# Patient Record
Sex: MALE | Race: OTHER | HISPANIC OR LATINO | URBAN - METROPOLITAN AREA
[De-identification: names, ages, dates, MRNs, and addresses within clinical notes are randomized per-mention and may not be internally consistent; named-entity substitution may affect disease eponyms.]

---

## 2021-10-05 ENCOUNTER — INPATIENT (INPATIENT)
Facility: HOSPITAL | Age: 71
LOS: 19 days | Discharge: HOME HEALTH | DRG: 216 | End: 2021-10-25
Attending: THORACIC SURGERY (CARDIOTHORACIC VASCULAR SURGERY) | Admitting: THORACIC SURGERY (CARDIOTHORACIC VASCULAR SURGERY)
Payer: MEDICAID

## 2021-10-05 VITALS
SYSTOLIC BLOOD PRESSURE: 143 MMHG | OXYGEN SATURATION: 97 % | TEMPERATURE: 98 F | HEART RATE: 80 BPM | DIASTOLIC BLOOD PRESSURE: 69 MMHG | RESPIRATION RATE: 18 BRPM | WEIGHT: 138.89 LBS

## 2021-10-05 DIAGNOSIS — Z95.1 PRESENCE OF AORTOCORONARY BYPASS GRAFT: Chronic | ICD-10-CM

## 2021-10-05 DIAGNOSIS — I34.2 NONRHEUMATIC MITRAL (VALVE) STENOSIS: ICD-10-CM

## 2021-10-05 PROBLEM — Z00.00 ENCOUNTER FOR PREVENTIVE HEALTH EXAMINATION: Status: ACTIVE | Noted: 2021-10-05

## 2021-10-05 LAB — GLUCOSE BLDC GLUCOMTR-MCNC: 97 MG/DL — SIGNIFICANT CHANGE UP (ref 70–99)

## 2021-10-05 PROCEDURE — 99223 1ST HOSP IP/OBS HIGH 75: CPT

## 2021-10-05 PROCEDURE — 99222 1ST HOSP IP/OBS MODERATE 55: CPT

## 2021-10-05 PROCEDURE — 71045 X-RAY EXAM CHEST 1 VIEW: CPT | Mod: 26

## 2021-10-05 RX ORDER — AMLODIPINE BESYLATE 2.5 MG/1
2.5 TABLET ORAL DAILY
Refills: 0 | Status: DISCONTINUED | OUTPATIENT
Start: 2021-10-05 | End: 2021-10-05

## 2021-10-05 RX ORDER — AMLODIPINE BESYLATE 2.5 MG/1
2.5 TABLET ORAL DAILY
Refills: 0 | Status: DISCONTINUED | OUTPATIENT
Start: 2021-10-06 | End: 2021-10-11

## 2021-10-05 RX ORDER — FUROSEMIDE 40 MG
40 TABLET ORAL DAILY
Refills: 0 | Status: DISCONTINUED | OUTPATIENT
Start: 2021-10-05 | End: 2021-10-11

## 2021-10-05 RX ORDER — ATORVASTATIN CALCIUM 80 MG/1
40 TABLET, FILM COATED ORAL AT BEDTIME
Refills: 0 | Status: DISCONTINUED | OUTPATIENT
Start: 2021-10-05 | End: 2021-10-11

## 2021-10-05 RX ORDER — METOPROLOL TARTRATE 50 MG
25 TABLET ORAL DAILY
Refills: 0 | Status: DISCONTINUED | OUTPATIENT
Start: 2021-10-05 | End: 2021-10-05

## 2021-10-05 RX ORDER — FOLIC ACID 0.8 MG
1 TABLET ORAL DAILY
Refills: 0 | Status: DISCONTINUED | OUTPATIENT
Start: 2021-10-06 | End: 2021-10-11

## 2021-10-05 RX ORDER — METOPROLOL TARTRATE 50 MG
25 TABLET ORAL DAILY
Refills: 0 | Status: DISCONTINUED | OUTPATIENT
Start: 2021-10-06 | End: 2021-10-11

## 2021-10-05 RX ORDER — THIAMINE MONONITRATE (VIT B1) 100 MG
1 TABLET ORAL
Qty: 0 | Refills: 0 | DISCHARGE

## 2021-10-05 RX ORDER — THIAMINE MONONITRATE (VIT B1) 100 MG
100 TABLET ORAL DAILY
Refills: 0 | Status: DISCONTINUED | OUTPATIENT
Start: 2021-10-05 | End: 2021-10-11

## 2021-10-05 RX ORDER — SODIUM CHLORIDE 9 MG/ML
3 INJECTION INTRAMUSCULAR; INTRAVENOUS; SUBCUTANEOUS EVERY 8 HOURS
Refills: 0 | Status: DISCONTINUED | OUTPATIENT
Start: 2021-10-05 | End: 2021-10-11

## 2021-10-05 NOTE — H&P ADULT - PROBLEM SELECTOR PLAN 4
Rate controlled on atrial fibrillation on tele   Continue beta blocker  On coumadin at home, will anticoagulate with heparin gtt for now  Keep K+ >4 Mg>2

## 2021-10-05 NOTE — H&P ADULT - NSHPPHYSICALEXAM_GEN_ALL_CORE
Tele: Afib 70s-80s  General: NAD  Neuro: A&Ox4, gait steady, speech clear, no focal deficits noted  Respiratory: B/L BS CTA, no wheeze, no rhonchi, no crackles noted  Cardiovascular: +PPM left anterior chest wall.  RRR, normal S1S2, no murmur noted  GI: Abd soft, NT/ND, +BSx4Q +BM  Peripheral Vascular:  B/L LE neg edema, 2+ peripheral pulses, no clubbing, cyanosis, varicosities/PVD noted  Musculoskeletal: B/L UE and LE 5/5 strength   Psychiatric: Normal mood, normal affect observed  Skin: Small circular non raised red rashes noted on chest which appears to be from where tele leads were previously placed.  Normal exam to inspection and palpation. no bleeding, no hematoma. Tele: Afib 70s-80s  General: NAD, patient with poor dentition +multiple missing teeth which have been falling out over the last several years   Neuro: A&Ox4, gait steady, speech clear, no focal deficits noted  Respiratory: B/L BS CTA, no wheeze, no rhonchi, no crackles noted  Cardiovascular: +PPM left anterior chest wall.  RRR, normal S1S2, no murmur noted  GI: Abd soft, NT/ND, +BSx4Q +BM  Peripheral Vascular:  B/L LE neg edema, 2+ peripheral pulses, no clubbing, cyanosis, varicosities/PVD noted  Musculoskeletal: B/L UE and LE 5/5 strength   Psychiatric: Normal mood, normal affect observed  Skin: Several small, itchy, circular non raised red rashes noted on chest which appears to be from where tele leads were previously placed.  Normal exam to inspection and palpation. no bleeding, no hematoma.

## 2021-10-05 NOTE — H&P ADULT - NSHPREVIEWOFSYSTEMS_GEN_ALL_CORE
General:  no weakness, fatigue, fevers or chills  Skin: no itching, burning, rashes, or lesions   HEENT: no visual changes;  no headache, no vertigo, no recent colds, nasal stuffiness or sore throat   Neck: no pain, stiffness or swollen glands  Respiratory: +SOB, no cough, sputum, wheezing, hemoptysis;   Cardiovascular: no chest pain, dyspnea or palpitations  GI: no abdominal or epigastric pain. no heartburn, nausea, vomiting, or hematemesis; no diarrhea or constipation. no melena or hematochezia  : no dysuria, frequency or hematuria  Peripheral Vascular: no intermittent claudication, leg cramps, varicose veins, swelling or swelling with redness and tenderness  Musculoskeletal: Denies limitations of movement or paralysis,  Neuro: no changes in orientation, memory, insight or judgment, no changes in mood, attention or speech.  no dizziness, vertigo or fainting, numbness, tingling or weakness

## 2021-10-05 NOTE — H&P ADULT - NSICDXPASTSURGICALHX_GEN_ALL_CORE_FT
PAST SURGICAL HISTORY:  History of mechanical aortic valve replacement     History of pacemaker      PAST SURGICAL HISTORY:  History of mechanical aortic valve replacement     History of pacemaker     S/P CABG (coronary artery bypass graft)

## 2021-10-05 NOTE — H&P ADULT - NSHPLABSRESULTS_GEN_ALL_CORE
CBC Full  -  ( 06 Oct 2021 00:39 )  WBC Count : 7.55 K/uL  RBC Count : 4.15 M/uL  Hemoglobin : 8.8 g/dL  Hematocrit : 29.3 %  Platelet Count - Automated : 183 K/uL  Mean Cell Volume : 70.6 fl  Mean Cell Hemoglobin : 21.2 pg  Mean Cell Hemoglobin Concentration : 30.0 gm/dL    Comprehensive Metabolic Panel (10.06.21 @ 00:39)    Sodium, Serum: 141 mmol/L    Potassium, Serum: 4.0 mmol/L    Chloride, Serum: 106 mmol/L    Carbon Dioxide, Serum: 24 mmol/L    Anion Gap, Serum: 11 mmol/L    Blood Urea Nitrogen, Serum: 17 mg/dL    Creatinine, Serum: 0.89 mg/dL    Glucose, Serum: 97 mg/dL    Calcium, Total Serum: 9.1 mg/dL    Protein Total, Serum: 7.1 g/dL    Albumin, Serum: 3.9 g/dL    Bilirubin Total, Serum: 0.7 mg/dL    Alkaline Phosphatase, Serum: 140 U/L    Aspartate Aminotransferase (AST/SGOT): 24 U/L    Alanine Aminotransferase (ALT/SGPT): 11 U/L    eGFR if Non : 87: Interpretative comment    PT/INR - ( 06 Oct 2021 00:39 )   PT: 14.1 sec;   INR: 1.18 ratio         PTT - ( 06 Oct 2021 00:39 )  PTT:107.0 sec    Serum Pro-Brain Natriuretic Peptide (10.06.21 @ 00:39)    Serum Pro-Brain Natriuretic Peptide: 689 pg/mL

## 2021-10-05 NOTE — H&P ADULT - ATTENDING COMMENTS
71 y/o s/p AVR, repair aortic aneurysm 2008, now with severe MR/TR.    Eval for redo sternotomy MVR TVR.  Pt unsure if he will undergo surgery.  Will also evaluate for mitraclip

## 2021-10-05 NOTE — H&P ADULT - NSHPSOCIALHISTORY_GEN_ALL_CORE
Patient is a retired   Lives with Sister  Denies alcohol use  Quit smoking when he was 54, smoked for 1 year 3 cigarettes a day Patient is a retired   Lives with Sister  Denies alcohol use  Smoked 3 cigarettes a day for 55 years quit 4 months ago  Drank beer for 30 years, several beers over the weekend.  Quit 1 year ago

## 2021-10-05 NOTE — H&P ADULT - HISTORY OF PRESENT ILLNESS
Patient is a 70M PMH HTN, CAD s/p CABG in 2009, Complete Heart Block s/p PPM placement, Paroxysmal Afib on coumadin, Aortic stenosis s/p Mechanical AVR who admitted to OSH with acute heart failure exacerbation found on echo to have low normal EF 50-55%, Mild reduced RV fx, mod-severe MR and severe TR. Transferred to [facility] for open heart surgery with Dr. Hua. Patient endorses shortness of breath and LAU, denies chest pain, abdominal pain, fevers, chills, nausea.  Patient is a 70M PMH HTN, CAD s/p CABG in 2009, Complete Heart Block s/p PPM placement, Paroxysmal Afib on coumadin, Aortic stenosis s/p Mechanical AVR who admitted to OSH with acute heart failure exacerbation/sob/LAU.  Patient states he has to stop and catch his breath when walking up stairs. On echo to have low normal EF 50-55%, Mild reduced RV fx, mod-severe MR and severe TR. Transferred to [facility] for open heart surgery with Dr. Hua. Patient endorses shortness of breath and LAU, denies chest pain, abdominal pain, fevers, chills, nausea.

## 2021-10-05 NOTE — H&P ADULT - ASSESSMENT
Patient is a 70M PMH HTN, CAD s/p CABG in 2009, Complete Heart Block s/p PPM placement, Paroxysmal Afib on coumadin, Aortic stenosis s/p Mechanical AVR who admitted to OSH with acute heart failure exacerbation found on echo to have low normal EF 50-55%, Mild reduced RV fx, mod-severe MR and severe TR. Transferred to [facility] for open heart surgery with Dr. Hua. Pre-op workup initiated.  Dr. Hua to review BRITT films from outside hospital, patient came with disc.  Patient will need CT Chest Non-Con and cardiac cath as part of pre-operative evaluation.  Currently patient is hemodynamically stable, rate controlled afib on tele and is chest pain/shortness of breath free on room air oxygen.  Coumadin held at this time and is being anticoagulated with heparin gtt given hx of afib/mechanical valve and need for cardiac cath.   Patient is a 70M PMH HTN, CAD s/p CABG 10 years ago per patient , Complete Heart Block s/p PPM placement, Paroxysmal Afib on coumadin, Aortic stenosis s/p Mechanical AVR who admitted to OSH with acute heart failure exacerbation found on echo to have low normal EF 50-55%, severely dilated RA/RV/pulm HTN reduced RV fx, severe MR and severe TR. Transferred to [facility] for open heart surgery with Dr. Hua. Pre-op workup initiated.  Dr. Hua to review BRITT films from outside hospital, patient came with disc.  Patient will need CT Chest Non-Con and cardiac cath as part of pre-operative evaluation.  Currently patient is hemodynamically stable, rate controlled afib on tele and is chest pain/shortness of breath free on room air oxygen.  Coumadin held at this time and is being anticoagulated with heparin gtt given hx of afib/mechanical valve and need for cardiac cath.

## 2021-10-05 NOTE — H&P ADULT - PROBLEM SELECTOR PLAN 2
Continue BB  Continue Atorvastatin s/p CABG 10 years ago per patient at [facility]   Continue BB  Continue Atorvastatin

## 2021-10-05 NOTE — H&P ADULT - PROBLEM SELECTOR PLAN 5
Patient with shortness of breath found to have at least mod MR on TTE done at OSH  Dr. Hua to review BRITT films  Continue BB  Continue Lasix 40mg  CXR

## 2021-10-06 DIAGNOSIS — Z95.1 PRESENCE OF AORTOCORONARY BYPASS GRAFT: Chronic | ICD-10-CM

## 2021-10-06 DIAGNOSIS — Z95.2 PRESENCE OF PROSTHETIC HEART VALVE: Chronic | ICD-10-CM

## 2021-10-06 DIAGNOSIS — I07.1 RHEUMATIC TRICUSPID INSUFFICIENCY: ICD-10-CM

## 2021-10-06 DIAGNOSIS — I48.91 UNSPECIFIED ATRIAL FIBRILLATION: ICD-10-CM

## 2021-10-06 DIAGNOSIS — I10 ESSENTIAL (PRIMARY) HYPERTENSION: ICD-10-CM

## 2021-10-06 DIAGNOSIS — I34.0 NONRHEUMATIC MITRAL (VALVE) INSUFFICIENCY: ICD-10-CM

## 2021-10-06 DIAGNOSIS — Z95.2 PRESENCE OF PROSTHETIC HEART VALVE: ICD-10-CM

## 2021-10-06 DIAGNOSIS — I25.10 ATHEROSCLEROTIC HEART DISEASE OF NATIVE CORONARY ARTERY WITHOUT ANGINA PECTORIS: ICD-10-CM

## 2021-10-06 DIAGNOSIS — Z95.0 PRESENCE OF CARDIAC PACEMAKER: Chronic | ICD-10-CM

## 2021-10-06 LAB
A1C WITH ESTIMATED AVERAGE GLUCOSE RESULT: 5.7 % — HIGH (ref 4–5.6)
A1C WITH ESTIMATED AVERAGE GLUCOSE RESULT: 5.8 % — HIGH (ref 4–5.6)
ALBUMIN SERPL ELPH-MCNC: 3.9 G/DL — SIGNIFICANT CHANGE UP (ref 3.3–5)
ALP SERPL-CCNC: 140 U/L — HIGH (ref 40–120)
ALT FLD-CCNC: 11 U/L — SIGNIFICANT CHANGE UP (ref 10–45)
ANION GAP SERPL CALC-SCNC: 11 MMOL/L — SIGNIFICANT CHANGE UP (ref 5–17)
ANISOCYTOSIS BLD QL: SLIGHT — SIGNIFICANT CHANGE UP
APPEARANCE UR: CLEAR — SIGNIFICANT CHANGE UP
APTT BLD: 105.5 SEC — HIGH (ref 27.5–35.5)
APTT BLD: 107 SEC — HIGH (ref 27.5–35.5)
AST SERPL-CCNC: 24 U/L — SIGNIFICANT CHANGE UP (ref 10–40)
BASOPHILS # BLD AUTO: 0.07 K/UL — SIGNIFICANT CHANGE UP (ref 0–0.2)
BASOPHILS NFR BLD AUTO: 0.9 % — SIGNIFICANT CHANGE UP (ref 0–2)
BILIRUB SERPL-MCNC: 0.7 MG/DL — SIGNIFICANT CHANGE UP (ref 0.2–1.2)
BILIRUB UR-MCNC: NEGATIVE — SIGNIFICANT CHANGE UP
BUN SERPL-MCNC: 17 MG/DL — SIGNIFICANT CHANGE UP (ref 7–23)
CALCIUM SERPL-MCNC: 9.1 MG/DL — SIGNIFICANT CHANGE UP (ref 8.4–10.5)
CHLORIDE SERPL-SCNC: 106 MMOL/L — SIGNIFICANT CHANGE UP (ref 96–108)
CHOLEST SERPL-MCNC: 103 MG/DL — SIGNIFICANT CHANGE UP
CO2 SERPL-SCNC: 24 MMOL/L — SIGNIFICANT CHANGE UP (ref 22–31)
COLOR SPEC: YELLOW — SIGNIFICANT CHANGE UP
COVID-19 SPIKE DOMAIN AB INTERP: POSITIVE
COVID-19 SPIKE DOMAIN ANTIBODY RESULT: 23.1 U/ML — HIGH
CREAT SERPL-MCNC: 0.89 MG/DL — SIGNIFICANT CHANGE UP (ref 0.5–1.3)
DIFF PNL FLD: NEGATIVE — SIGNIFICANT CHANGE UP
EOSINOPHIL # BLD AUTO: 2.14 K/UL — HIGH (ref 0–0.5)
EOSINOPHIL NFR BLD AUTO: 28.4 % — HIGH (ref 0–6)
ESTIMATED AVERAGE GLUCOSE: 117 MG/DL — HIGH (ref 68–114)
ESTIMATED AVERAGE GLUCOSE: 120 MG/DL — HIGH (ref 68–114)
FIBRINOGEN PPP-MCNC: 370 MG/DL — SIGNIFICANT CHANGE UP (ref 290–520)
GIANT PLATELETS BLD QL SMEAR: PRESENT — SIGNIFICANT CHANGE UP
GLUCOSE SERPL-MCNC: 97 MG/DL — SIGNIFICANT CHANGE UP (ref 70–99)
GLUCOSE UR QL: NEGATIVE — SIGNIFICANT CHANGE UP
HAV IGM SER-ACNC: SIGNIFICANT CHANGE UP
HBV CORE IGM SER-ACNC: SIGNIFICANT CHANGE UP
HBV SURFACE AG SER-ACNC: SIGNIFICANT CHANGE UP
HCT VFR BLD CALC: 29.1 % — LOW (ref 39–50)
HCT VFR BLD CALC: 29.3 % — LOW (ref 39–50)
HCV AB S/CO SERPL IA: 0.14 S/CO — SIGNIFICANT CHANGE UP (ref 0–0.99)
HCV AB S/CO SERPL IA: 0.15 S/CO — SIGNIFICANT CHANGE UP (ref 0–0.99)
HCV AB SERPL-IMP: SIGNIFICANT CHANGE UP
HCV AB SERPL-IMP: SIGNIFICANT CHANGE UP
HDLC SERPL-MCNC: 52 MG/DL — SIGNIFICANT CHANGE UP
HGB BLD-MCNC: 8.8 G/DL — LOW (ref 13–17)
HGB BLD-MCNC: 8.9 G/DL — LOW (ref 13–17)
HYPOCHROMIA BLD QL: SLIGHT — SIGNIFICANT CHANGE UP
INR BLD: 1.18 RATIO — HIGH (ref 0.88–1.16)
KETONES UR-MCNC: NEGATIVE — SIGNIFICANT CHANGE UP
LEUKOCYTE ESTERASE UR-ACNC: NEGATIVE — SIGNIFICANT CHANGE UP
LIPID PNL WITH DIRECT LDL SERPL: 45 MG/DL — SIGNIFICANT CHANGE UP
LYMPHOCYTES # BLD AUTO: 1.73 K/UL — SIGNIFICANT CHANGE UP (ref 1–3.3)
LYMPHOCYTES # BLD AUTO: 22.9 % — SIGNIFICANT CHANGE UP (ref 13–44)
MANUAL SMEAR VERIFICATION: SIGNIFICANT CHANGE UP
MCHC RBC-ENTMCNC: 21.2 PG — LOW (ref 27–34)
MCHC RBC-ENTMCNC: 21.7 PG — LOW (ref 27–34)
MCHC RBC-ENTMCNC: 30 GM/DL — LOW (ref 32–36)
MCHC RBC-ENTMCNC: 30.6 GM/DL — LOW (ref 32–36)
MCV RBC AUTO: 70.6 FL — LOW (ref 80–100)
MCV RBC AUTO: 71 FL — LOW (ref 80–100)
MONOCYTES # BLD AUTO: 0.97 K/UL — HIGH (ref 0–0.9)
MONOCYTES NFR BLD AUTO: 12.9 % — SIGNIFICANT CHANGE UP (ref 2–14)
MRSA PCR RESULT.: SIGNIFICANT CHANGE UP
NEUTROPHILS # BLD AUTO: 2.63 K/UL — SIGNIFICANT CHANGE UP (ref 1.8–7.4)
NEUTROPHILS NFR BLD AUTO: 34.9 % — LOW (ref 43–77)
NITRITE UR-MCNC: NEGATIVE — SIGNIFICANT CHANGE UP
NON HDL CHOLESTEROL: 52 MG/DL — SIGNIFICANT CHANGE UP
NRBC # BLD: 0 /100 WBCS — SIGNIFICANT CHANGE UP (ref 0–0)
NT-PROBNP SERPL-SCNC: 689 PG/ML — HIGH (ref 0–300)
OVALOCYTES BLD QL SMEAR: SLIGHT — SIGNIFICANT CHANGE UP
PA ADP PRP-ACNC: 274 PRU — SIGNIFICANT CHANGE UP (ref 194–417)
PH UR: 6.5 — SIGNIFICANT CHANGE UP (ref 5–8)
PLAT MORPH BLD: NORMAL — SIGNIFICANT CHANGE UP
PLATELET # BLD AUTO: 183 K/UL — SIGNIFICANT CHANGE UP (ref 150–400)
PLATELET # BLD AUTO: 186 K/UL — SIGNIFICANT CHANGE UP (ref 150–400)
PLATELET COUNT - ESTIMATE: ABNORMAL
POIKILOCYTOSIS BLD QL AUTO: SIGNIFICANT CHANGE UP
POTASSIUM SERPL-MCNC: 4 MMOL/L — SIGNIFICANT CHANGE UP (ref 3.5–5.3)
POTASSIUM SERPL-SCNC: 4 MMOL/L — SIGNIFICANT CHANGE UP (ref 3.5–5.3)
PREALB SERPL-MCNC: 10 MG/DL — LOW (ref 20–40)
PROT SERPL-MCNC: 7.1 G/DL — SIGNIFICANT CHANGE UP (ref 6–8.3)
PROT UR-MCNC: NEGATIVE — SIGNIFICANT CHANGE UP
PROTHROM AB SERPL-ACNC: 14.1 SEC — HIGH (ref 10.6–13.6)
RBC # BLD: 4.1 M/UL — LOW (ref 4.2–5.8)
RBC # BLD: 4.15 M/UL — LOW (ref 4.2–5.8)
RBC # FLD: 19.3 % — HIGH (ref 10.3–14.5)
RBC # FLD: 19.4 % — HIGH (ref 10.3–14.5)
RBC BLD AUTO: ABNORMAL
S AUREUS DNA NOSE QL NAA+PROBE: DETECTED
SARS-COV-2 IGG+IGM SERPL QL IA: 23.1 U/ML — HIGH
SARS-COV-2 IGG+IGM SERPL QL IA: POSITIVE
SARS-COV-2 RNA SPEC QL NAA+PROBE: SIGNIFICANT CHANGE UP
SCHISTOCYTES BLD QL AUTO: SLIGHT — SIGNIFICANT CHANGE UP
SMUDGE CELLS # BLD: PRESENT — SIGNIFICANT CHANGE UP
SODIUM SERPL-SCNC: 141 MMOL/L — SIGNIFICANT CHANGE UP (ref 135–145)
SP GR SPEC: 1.01 — SIGNIFICANT CHANGE UP (ref 1.01–1.02)
TRIGL SERPL-MCNC: 34 MG/DL — SIGNIFICANT CHANGE UP
TSH SERPL-MCNC: 8.37 UIU/ML — HIGH (ref 0.27–4.2)
UROBILINOGEN FLD QL: ABNORMAL
WBC # BLD: 6.56 K/UL — SIGNIFICANT CHANGE UP (ref 3.8–10.5)
WBC # BLD: 7.55 K/UL — SIGNIFICANT CHANGE UP (ref 3.8–10.5)
WBC # FLD AUTO: 6.56 K/UL — SIGNIFICANT CHANGE UP (ref 3.8–10.5)
WBC # FLD AUTO: 7.55 K/UL — SIGNIFICANT CHANGE UP (ref 3.8–10.5)

## 2021-10-06 PROCEDURE — 93454 CORONARY ARTERY ANGIO S&I: CPT | Mod: 26

## 2021-10-06 PROCEDURE — 99232 SBSQ HOSP IP/OBS MODERATE 35: CPT

## 2021-10-06 PROCEDURE — 93010 ELECTROCARDIOGRAM REPORT: CPT

## 2021-10-06 PROCEDURE — 71250 CT THORAX DX C-: CPT | Mod: 26

## 2021-10-06 PROCEDURE — 99152 MOD SED SAME PHYS/QHP 5/>YRS: CPT

## 2021-10-06 RX ORDER — HEPARIN SODIUM 5000 [USP'U]/ML
850 INJECTION INTRAVENOUS; SUBCUTANEOUS
Qty: 25000 | Refills: 0 | Status: DISCONTINUED | OUTPATIENT
Start: 2021-10-06 | End: 2021-10-06

## 2021-10-06 RX ORDER — HEPARIN SODIUM 5000 [USP'U]/ML
750 INJECTION INTRAVENOUS; SUBCUTANEOUS
Qty: 25000 | Refills: 0 | Status: DISCONTINUED | OUTPATIENT
Start: 2021-10-06 | End: 2021-10-11

## 2021-10-06 RX ORDER — FERROUS SULFATE 325(65) MG
325 TABLET ORAL DAILY
Refills: 0 | Status: DISCONTINUED | OUTPATIENT
Start: 2021-10-06 | End: 2021-10-11

## 2021-10-06 RX ADMIN — SODIUM CHLORIDE 3 MILLILITER(S): 9 INJECTION INTRAMUSCULAR; INTRAVENOUS; SUBCUTANEOUS at 16:00

## 2021-10-06 RX ADMIN — SODIUM CHLORIDE 3 MILLILITER(S): 9 INJECTION INTRAMUSCULAR; INTRAVENOUS; SUBCUTANEOUS at 06:31

## 2021-10-06 RX ADMIN — AMLODIPINE BESYLATE 2.5 MILLIGRAM(S): 2.5 TABLET ORAL at 05:18

## 2021-10-06 RX ADMIN — Medication 40 MILLIGRAM(S): at 05:18

## 2021-10-06 RX ADMIN — HEPARIN SODIUM 7.5 UNIT(S)/HR: 5000 INJECTION INTRAVENOUS; SUBCUTANEOUS at 10:18

## 2021-10-06 RX ADMIN — Medication 25 MILLIGRAM(S): at 05:18

## 2021-10-06 RX ADMIN — Medication 325 MILLIGRAM(S): at 12:12

## 2021-10-06 RX ADMIN — Medication 100 MILLIGRAM(S): at 12:12

## 2021-10-06 RX ADMIN — SODIUM CHLORIDE 3 MILLILITER(S): 9 INJECTION INTRAMUSCULAR; INTRAVENOUS; SUBCUTANEOUS at 22:08

## 2021-10-06 RX ADMIN — ATORVASTATIN CALCIUM 40 MILLIGRAM(S): 80 TABLET, FILM COATED ORAL at 20:35

## 2021-10-06 RX ADMIN — Medication 1 MILLIGRAM(S): at 12:12

## 2021-10-06 RX ADMIN — HEPARIN SODIUM 7.5 UNIT(S)/HR: 5000 INJECTION INTRAVENOUS; SUBCUTANEOUS at 20:35

## 2021-10-06 RX ADMIN — HEPARIN SODIUM 8.5 UNIT(S)/HR: 5000 INJECTION INTRAVENOUS; SUBCUTANEOUS at 02:02

## 2021-10-06 NOTE — PROGRESS NOTE ADULT - SUBJECTIVE AND OBJECTIVE BOX
Subjective: Pt states "Hello" denies any CP or SOB.     Telemetry:  Afib 70 - 90s  Vital Signs Last 24 Hrs  T(C): 36.3 (10-06-21 @ 11:01), Max: 36.9 (10-05-21 @ 21:30)  T(F): 97.3 (10-06-21 @ 11:01), Max: 98.4 (10-05-21 @ 21:30)  HR: 78 (10-06-21 @ 11:) (72 - 80)  BP: 126/65 (10-06-21 @ 11:01) (99/66 - 143/69)  RR: 18 (10-06-21 @ 11:) (18 - 18)  SpO2: 99% (10-06-21 @ 11:) (97% - 99%)             10-05 @ 07:01  -  10-06 @ 07:00  --------------------------------------------------------  IN: 0 mL / OUT: 301 mL / NET: -301 mL      Daily Weight in k.1 (06 Oct 2021 07:58)                        8.9    6.56  )-----------( 186      ( 06 Oct 2021 08:14 )             29.1     141  |  106  |  17  ----------------------------<  97  4.0   |  24  |  0.89    AST  24  /  ALT  11  /  AlkPhos  140<H>  10-06        CAPILLARY BLOOD GLUCOSE  POCT Blood Glucose.: 97 mg/dL (05 Oct 2021 22:31)          PHYSICAL EXAM  Neurology: A&Ox3, NAD  CV : RRR+S1S2  Lungs: Respirations non-labored, B/L BS CTA  Abdomen: Soft, NT/ND, +BSx4Q  : Voiding without difficulty  Extremities: B/L LE no edema, negative calf tenderness, +PP            MEDICATIONS  amLODIPine   Tablet 2.5 milliGRAM(s) Oral daily  atorvastatin 40 milliGRAM(s) Oral at bedtime  ferrous    sulfate 325 milliGRAM(s) Oral daily  folic acid 1 milliGRAM(s) Oral daily  furosemide    Tablet 40 milliGRAM(s) Oral daily  heparin  Infusion 850 Unit(s)/Hr IV Continuous <Continuous>  metoprolol succinate ER 25 milliGRAM(s) Oral daily  sodium chloride 0.9% lock flush 3 milliLiter(s) IV Push every 8 hours  thiamine 100 milliGRAM(s) Oral daily      Discussed with Cardiothoracic Team at AM rounds.

## 2021-10-06 NOTE — PROGRESS NOTE ADULT - PROBLEM SELECTOR PLAN 3
HX of Mechanical AVR from 2008  On coumadin at hold-- INR 1.18  Continue AC with Heparin gtt  aptt goal 70 -90  Check non-con CT Chest

## 2021-10-06 NOTE — PROGRESS NOTE ADULT - PROBLEM SELECTOR PLAN 2
s/p CABG 10 years ago per patient at [facility]   Continue ToprolXL 25 daily  Continue Atorvastatin 40 HS  Added on for pre-op Cardiac cath with Dr. Martins today

## 2021-10-06 NOTE — PROGRESS NOTE ADULT - ASSESSMENT
Patient is a 70M PMH HTN, CAD s/p CABG 10 years ago per patient , Complete Heart Block s/p PPM placement, Paroxysmal Afib on coumadin, Aortic stenosis s/p Mechanical AVR who admitted to OSH with acute heart failure exacerbation found on echo to have low normal EF 50-55%, severely dilated RA/RV/pulm HTN reduced RV fx, severe MR and severe TR. Transferred to [facility] for open heart surgery with Dr. Hua. Pre-op workup initiated.  Dr. Hua to review BRITT films from outside hospital, patient came with disc.  Patient will need CT Chest Non-Con and cardiac cath as part of pre-operative evaluation.  Currently patient is hemodynamically stable, rate controlled afib on tele and is chest pain/shortness of breath free on room air oxygen.  Coumadin held at this time and is being anticoagulated with heparin gtt given hx of afib/mechanical valve and need for cardiac cath.

## 2021-10-07 DIAGNOSIS — Z95.0 PRESENCE OF CARDIAC PACEMAKER: ICD-10-CM

## 2021-10-07 LAB
ANION GAP SERPL CALC-SCNC: 12 MMOL/L — SIGNIFICANT CHANGE UP (ref 5–17)
APTT BLD: 49.6 SEC — HIGH (ref 27.5–35.5)
APTT BLD: 72.1 SEC — HIGH (ref 27.5–35.5)
APTT BLD: 89.2 SEC — HIGH (ref 27.5–35.5)
BLD GP AB SCN SERPL QL: NEGATIVE — SIGNIFICANT CHANGE UP
BUN SERPL-MCNC: 17 MG/DL — SIGNIFICANT CHANGE UP (ref 7–23)
CALCIUM SERPL-MCNC: 9 MG/DL — SIGNIFICANT CHANGE UP (ref 8.4–10.5)
CHLORIDE SERPL-SCNC: 106 MMOL/L — SIGNIFICANT CHANGE UP (ref 96–108)
CO2 SERPL-SCNC: 22 MMOL/L — SIGNIFICANT CHANGE UP (ref 22–31)
CREAT SERPL-MCNC: 0.9 MG/DL — SIGNIFICANT CHANGE UP (ref 0.5–1.3)
GLUCOSE SERPL-MCNC: 92 MG/DL — SIGNIFICANT CHANGE UP (ref 70–99)
HCT VFR BLD CALC: 27.2 % — LOW (ref 39–50)
HCT VFR BLD CALC: 27.2 % — LOW (ref 39–50)
HCT VFR BLD CALC: 28 % — LOW (ref 39–50)
HGB BLD-MCNC: 8.1 G/DL — LOW (ref 13–17)
HGB BLD-MCNC: 8.4 G/DL — LOW (ref 13–17)
HGB BLD-MCNC: 8.4 G/DL — LOW (ref 13–17)
MCHC RBC-ENTMCNC: 21.1 PG — LOW (ref 27–34)
MCHC RBC-ENTMCNC: 21.3 PG — LOW (ref 27–34)
MCHC RBC-ENTMCNC: 21.9 PG — LOW (ref 27–34)
MCHC RBC-ENTMCNC: 29.8 GM/DL — LOW (ref 32–36)
MCHC RBC-ENTMCNC: 30 GM/DL — LOW (ref 32–36)
MCHC RBC-ENTMCNC: 30.9 GM/DL — LOW (ref 32–36)
MCV RBC AUTO: 70.8 FL — LOW (ref 80–100)
MCV RBC AUTO: 70.9 FL — LOW (ref 80–100)
MCV RBC AUTO: 71 FL — LOW (ref 80–100)
NRBC # BLD: 0 /100 WBCS — SIGNIFICANT CHANGE UP (ref 0–0)
PLATELET # BLD AUTO: 167 K/UL — SIGNIFICANT CHANGE UP (ref 150–400)
PLATELET # BLD AUTO: 170 K/UL — SIGNIFICANT CHANGE UP (ref 150–400)
PLATELET # BLD AUTO: 171 K/UL — SIGNIFICANT CHANGE UP (ref 150–400)
POTASSIUM SERPL-MCNC: 3.8 MMOL/L — SIGNIFICANT CHANGE UP (ref 3.5–5.3)
POTASSIUM SERPL-SCNC: 3.8 MMOL/L — SIGNIFICANT CHANGE UP (ref 3.5–5.3)
RBC # BLD: 3.83 M/UL — LOW (ref 4.2–5.8)
RBC # BLD: 3.84 M/UL — LOW (ref 4.2–5.8)
RBC # BLD: 3.95 M/UL — LOW (ref 4.2–5.8)
RBC # FLD: 19.7 % — HIGH (ref 10.3–14.5)
RBC # FLD: 19.7 % — HIGH (ref 10.3–14.5)
RBC # FLD: 19.8 % — HIGH (ref 10.3–14.5)
RH IG SCN BLD-IMP: POSITIVE — SIGNIFICANT CHANGE UP
SODIUM SERPL-SCNC: 140 MMOL/L — SIGNIFICANT CHANGE UP (ref 135–145)
T3 SERPL-MCNC: 104 NG/DL — SIGNIFICANT CHANGE UP (ref 80–200)
T4 AB SER-ACNC: 7.8 UG/DL — SIGNIFICANT CHANGE UP (ref 4.6–12)
WBC # BLD: 6.83 K/UL — SIGNIFICANT CHANGE UP (ref 3.8–10.5)
WBC # BLD: 6.94 K/UL — SIGNIFICANT CHANGE UP (ref 3.8–10.5)
WBC # BLD: 7.08 K/UL — SIGNIFICANT CHANGE UP (ref 3.8–10.5)
WBC # FLD AUTO: 6.83 K/UL — SIGNIFICANT CHANGE UP (ref 3.8–10.5)
WBC # FLD AUTO: 6.94 K/UL — SIGNIFICANT CHANGE UP (ref 3.8–10.5)
WBC # FLD AUTO: 7.08 K/UL — SIGNIFICANT CHANGE UP (ref 3.8–10.5)

## 2021-10-07 PROCEDURE — 99232 SBSQ HOSP IP/OBS MODERATE 35: CPT

## 2021-10-07 PROCEDURE — 93880 EXTRACRANIAL BILAT STUDY: CPT | Mod: 26

## 2021-10-07 PROCEDURE — 99253 IP/OBS CNSLTJ NEW/EST LOW 45: CPT

## 2021-10-07 RX ORDER — MUPIROCIN 20 MG/G
1 OINTMENT TOPICAL
Refills: 0 | Status: DISCONTINUED | OUTPATIENT
Start: 2021-10-07 | End: 2021-10-11

## 2021-10-07 RX ADMIN — SODIUM CHLORIDE 3 MILLILITER(S): 9 INJECTION INTRAMUSCULAR; INTRAVENOUS; SUBCUTANEOUS at 23:28

## 2021-10-07 RX ADMIN — SODIUM CHLORIDE 3 MILLILITER(S): 9 INJECTION INTRAMUSCULAR; INTRAVENOUS; SUBCUTANEOUS at 13:44

## 2021-10-07 RX ADMIN — SODIUM CHLORIDE 3 MILLILITER(S): 9 INJECTION INTRAMUSCULAR; INTRAVENOUS; SUBCUTANEOUS at 06:52

## 2021-10-07 RX ADMIN — Medication 40 MILLIGRAM(S): at 05:18

## 2021-10-07 RX ADMIN — ATORVASTATIN CALCIUM 40 MILLIGRAM(S): 80 TABLET, FILM COATED ORAL at 20:57

## 2021-10-07 RX ADMIN — HEPARIN SODIUM 9.5 UNIT(S)/HR: 5000 INJECTION INTRAVENOUS; SUBCUTANEOUS at 23:31

## 2021-10-07 RX ADMIN — HEPARIN SODIUM 9.5 UNIT(S)/HR: 5000 INJECTION INTRAVENOUS; SUBCUTANEOUS at 15:11

## 2021-10-07 RX ADMIN — Medication 1 MILLIGRAM(S): at 13:43

## 2021-10-07 RX ADMIN — MUPIROCIN 1 APPLICATION(S): 20 OINTMENT TOPICAL at 13:44

## 2021-10-07 RX ADMIN — Medication 100 MILLIGRAM(S): at 13:43

## 2021-10-07 RX ADMIN — HEPARIN SODIUM 9.5 UNIT(S)/HR: 5000 INJECTION INTRAVENOUS; SUBCUTANEOUS at 04:10

## 2021-10-07 RX ADMIN — MUPIROCIN 1 APPLICATION(S): 20 OINTMENT TOPICAL at 21:00

## 2021-10-07 RX ADMIN — Medication 325 MILLIGRAM(S): at 13:43

## 2021-10-07 NOTE — CONSULT NOTE ADULT - ATTENDING COMMENTS
seen and agree  plan for surgery with removal of intracardiac/SVC portions of lead and LV epicardial lead placement

## 2021-10-07 NOTE — CONSULT NOTE ADULT - SUBJECTIVE AND OBJECTIVE BOX
HISTORY OF PRESENT ILLNESS:  Patient is a 70M PMH HTN, CAD s/p CABG in 2009, Complete Heart Block s/p MDT Adapta PPM placement 7/7/2008 (Dr. Jose Patterson), Paroxysmal Afib on coumadin (non-adherent), Unicuspid AV/Aortic stenosis s/p Mechanical AVR (2008) who admitted to [facility] with acute heart failure exacerbation/sob/LAU.  Patient states he has to stop and catch his breath when walking up stairs. On echo to have low normal EF 50-55%, Mild reduced RV fx, mod-severe MR and severe TR. Transferred to [facility] for open heart surgery with Dr. Hua. Patient endorses shortness of breath and LAU, denies chest pain, abdominal pain, fevers, chills, nausea.      #: [id number]  Pt states he presented to [facility] as he was SOB and was experiencing some chest pain. He reports palpitations but states they are there "all the time", not worsening. Pt is unsure of indication of pacemaker placement but states he remembers being SOB/fatigued and was "slow". Pt has never had pacemaker interrogated since placement, does not have an outside cardiologist or EP clinic. He states he was prescribed Coumadin ~1 year ago, is not taking it regularly. Pt has a prescription bottle of Metoprolol 25mg, he states he is not taking that medication at home. Per medical records pt brought with him, he was prescribed Sotalol in 2008, states he never took it.     Per MDT: MDT Adapta placed at [facility] by Dr. Jose Patterson in 7/7/2008. Indication: Tachy/arely syndrome.      Allergies  penicillin (Rash; Pruritus; Hives)  tell electrodes (Rash)  	    MEDICATIONS:  amLODIPine   Tablet 2.5 milliGRAM(s) Oral daily  furosemide    Tablet 40 milliGRAM(s) Oral daily  heparin  Infusion 750 Unit(s)/Hr IV Continuous <Continuous>  metoprolol succinate ER 25 milliGRAM(s) Oral daily  atorvastatin 40 milliGRAM(s) Oral at bedtime  ferrous    sulfate 325 milliGRAM(s) Oral daily  folic acid 1 milliGRAM(s) Oral daily  mupirocin 2% Ointment 1 Application(s) Topical two times a day  sodium chloride 0.9% lock flush 3 milliLiter(s) IV Push every 8 hours  thiamine 100 milliGRAM(s) Oral daily    PAST MEDICAL & SURGICAL HISTORY:  HTN (hypertension)  Complete heart block  CAD (coronary artery disease)  Paroxysmal atrial fibrillation  History of mechanical aortic valve replacement  History of pacemaker  S/P CABG (coronary artery bypass graft)      SOCIAL HISTORY:    Smoker - 3 cigs/day  Alcohol - 2 beers 2x/week      REVIEW OF SYSTEMS:  See HPI. Otherwise, 10 point ROS done and otherwise negative.    PHYSICAL EXAM:  T(C): 36.8 (10-07-21 @ 04:33), Max: 37 (10-06-21 @ 20:33)  HR: 63 (10-07-21 @ 04:33) (61 - 78)  BP: 99/56 (10-07-21 @ 04:33) (95/54 - 123/75)  RR: 18 (10-07-21 @ 04:33) (13 - 20)  SpO2: 97% (10-07-21 @ 04:33) (94% - 100%)  Wt(kg): --  I&O's Summary    06 Oct 2021 07:01  -  07 Oct 2021 07:00  --------------------------------------------------------  IN: 976 mL / OUT: 300 mL / NET: 676 mL    07 Oct 2021 07:01  -  07 Oct 2021 11:49  --------------------------------------------------------  IN: 480 mL / OUT: 0 mL / NET: 480 mL      Appearance: Normal	  HEENT: Atraumatic  Cardiovascular: Irregularly irregular  Respiratory: Lungs clear to auscultation	  Psychiatry: A & O x 3, Mood & affect appropriate  Gastrointestinal:  Soft, Non-tender	  Neurologic: Non-focal  Extremities: No BLE edema  Vascular: Peripheral pulses palpable 2+ bilaterally        LABS:	 	  CBC Full  -  ( 07 Oct 2021 06:00 )  WBC Count : 6.83 K/uL  Hemoglobin : 8.4 g/dL  Hematocrit : 27.2 %  Platelet Count - Automated : 167 K/uL  Mean Cell Volume : 70.8 fl  Mean Cell Hemoglobin : 21.9 pg  Mean Cell Hemoglobin Concentration : 30.9 gm/dL    10-07    140  |  106  |  17  ----------------------------<  92  3.8   |  22  |  0.90  10-06    141  |  106  |  17  ----------------------------<  97  4.0   |  24  |  0.89    Ca    9.0      07 Oct 2021 06:00  Ca    9.1      06 Oct 2021 00:39    TPro  7.1  /  Alb  3.9  /  TBili  0.7  /  DBili  x   /  AST  24  /  ALT  11  /  AlkPhos  140<H>  10-06    proBNP: Serum Pro-Brain Natriuretic Peptide: 689 pg/mL (10.06.21 @ 00:39)  TSH: Thyroid Stimulating Hormone, Serum: 8.37 uIU/mL (10.06.21 @ 05:04)    TELEMETRY: AF 60-90s.     ECG:  AF rates ~60s, RBBB.   RADIOLOGY: < from: Xray Chest 1 View AP/PA (10.05.21 @ 23:48) >  IMPRESSION:    Cardiomegaly. Clear lungs.    < end of copied text >    PREVIOUS DIAGNOSTIC TESTING:    [ ] Echocardiogram: < from: Transthoracic Echocardiogram w/ Doppler (06.16.08 @ 11:00) >  Mitral Valve: Normal mitral valve.  Aortic Valve/Aorta: Mechanical aortic valve prosthesis.  Normal aortic root.  Left Atrium: Severe left atrial enlargement.  Left Ventricle: Concentric left ventricular hypertrophy.  Endocardium not well visualized; grossly normal left  ventricular function.  Right Heart: Severe right atrial enlargement.  Grossly normal right ventricular size and systolic  function.  Normal tricuspid and pulmonic valves.  Pericardium/Pleura: Normal pericardium with no pericardial  effusion.  Doppler: Minimal mitral regurgitation.  Minimal aortic regurgitation. Peak transaortic valve  gradient equals 37 mm Hg, mean transaortic valve gradient  equals 26mm Hg, which is probably normalin the setting of  a prosthetic valve.  Moderate tricuspid regurgitation. Estimated pulmonary  artery systolic pressure equals 32 mm Hg, assuming right  atrial pressure equals 10  mm Hg.  ------------------------------------------------------------------------  Conclusions:  1. Endocardium not well visualized; grossly normal left  ventricular function.  2. Grossly normal right ventricular size and systolic  function.  3. A mechanical prosthetic valve is present in the aortic  position and appearswell seated.  Minimal aortic  regurgitation. Peak transaortic valve gradient equals 37 mm  Hg, mean transaortic valve gradient equals 26mm Hg, which  is probably normal in the setting of a prosthetic valve.  4. Severe biatrial dilatation.    < end of copied text >    TTE 10/1/21 ([facility] records)  LVEF, by visual estimation, 50-55%  Low normal global LV systolic function  RV volume overload  Mildly reduced RV systolic function  RV size is severely enlarged  Mildly dilated Left atrium  Severely dilated right atrium  At least moderate eccentric MTR  Malcoapting tricuspid leaflets with torrestial TR  Aortic valve is normally functioning mechanical prosthetic valve.     BRITT 10/4/21 ([facility] records)  LVEF, by visual estimated is 50-55%  Normal global LV systolic function  RV size is severely enlarged  Mildly dilated LA  severely dilated RA  Mild thickening of the anterior and posterior mitral valve leaflets  Severe MVR  Aortic valve is normally functioning mechanical prosthetic valve  mechanical prosthesis in aortic valve position  Prosthetic graft in ascending aorta position  dilated pulmonary artery  pulmonary HTN is present  No LA/RENÉE thrombus seen  Small plaque involving transverse and descending aorta.     [ ]  Catheterization: < from: Cardiac Catheterization (10.06.21 @ 13:55) >    Diagnostic Findings:     Coronary Angiography   The coronary circulation is right dominant.      LM   Left main artery: Angiography shows no disease.      LAD   Left anterior descending artery: Angiography shows no disease.      Patient: EDISON ROBLES               MRN: [id number]  Study Date: 10/06/2021   01:55 PM      Page 1 of 3          CX   Circumflex: Angiography shows no disease.      RCA   Right coronary artery: Angiography shows no disease.      < end of copied text >   HISTORY OF PRESENT ILLNESS:  Patient is a 70M PMH HTN, CAD s/p CABG in 2009, Complete Heart Block s/p MDT Adapta PPM placement 7/7/2008 (Dr. Jose Patterson), Paroxysmal Afib on coumadin (non-adherent), Unicuspid AV/Aortic stenosis s/p Mechanical AVR (2008) who admitted to [facility] with acute heart failure exacerbation/sob/LAU.  Patient states he has to stop and catch his breath when walking up stairs. On echo to have low normal EF 50-55%, Mild reduced RV fx, mod-severe MR and severe TR. Transferred to [facility] for open heart surgery with Dr. Hua. Patient endorses shortness of breath and LAU, denies chest pain, abdominal pain, fevers, chills, nausea.      #: [id number]  Pt states he presented to [facility] as he was SOB and was experiencing some chest pain. He reports palpitations but states they are there "all the time", not worsening. Pt is unsure of indication of pacemaker placement but states he remembers being SOB/fatigued and was "slow". He denies any hx of syncope, prior to or after pacemaker was placed. Pt has never had pacemaker interrogated since placement, does not have an outside cardiologist or EP clinic. He states he was prescribed Coumadin ~1 year ago, is not taking it regularly. Pt has a prescription bottle of Metoprolol 25mg, he states he is not taking that medication at home. Per medical records pt brought with him, he was prescribed Sotalol in 2008, states he never took it.     Per MDT: MDT Adapta placed at [facility] by Dr. Jose Patterson in 7/7/2008. Indication: Tachy/arely syndrome.      Allergies  penicillin (Rash; Pruritus; Hives)  tell electrodes (Rash)  	    MEDICATIONS:  amLODIPine   Tablet 2.5 milliGRAM(s) Oral daily  furosemide    Tablet 40 milliGRAM(s) Oral daily  heparin  Infusion 750 Unit(s)/Hr IV Continuous <Continuous>  metoprolol succinate ER 25 milliGRAM(s) Oral daily  atorvastatin 40 milliGRAM(s) Oral at bedtime  ferrous    sulfate 325 milliGRAM(s) Oral daily  folic acid 1 milliGRAM(s) Oral daily  mupirocin 2% Ointment 1 Application(s) Topical two times a day  sodium chloride 0.9% lock flush 3 milliLiter(s) IV Push every 8 hours  thiamine 100 milliGRAM(s) Oral daily    PAST MEDICAL & SURGICAL HISTORY:  HTN (hypertension)  Complete heart block  CAD (coronary artery disease)  Paroxysmal atrial fibrillation  History of mechanical aortic valve replacement  History of pacemaker  S/P CABG (coronary artery bypass graft)      SOCIAL HISTORY:    Smoker - 3 cigs/day  Alcohol - 2 beers 2x/week      REVIEW OF SYSTEMS:  See HPI. Otherwise, 10 point ROS done and otherwise negative.    PHYSICAL EXAM:  T(C): 36.8 (10-07-21 @ 04:33), Max: 37 (10-06-21 @ 20:33)  HR: 63 (10-07-21 @ 04:33) (61 - 78)  BP: 99/56 (10-07-21 @ 04:33) (95/54 - 123/75)  RR: 18 (10-07-21 @ 04:33) (13 - 20)  SpO2: 97% (10-07-21 @ 04:33) (94% - 100%)  Wt(kg): --  I&O's Summary    06 Oct 2021 07:01  -  07 Oct 2021 07:00  --------------------------------------------------------  IN: 976 mL / OUT: 300 mL / NET: 676 mL    07 Oct 2021 07:01  -  07 Oct 2021 11:49  --------------------------------------------------------  IN: 480 mL / OUT: 0 mL / NET: 480 mL      Appearance: Normal	  HEENT: Atraumatic  Cardiovascular: Irregularly irregular  Respiratory: Lungs clear to auscultation	  Psychiatry: A & O x 3, Mood & affect appropriate  Gastrointestinal:  Soft, Non-tender	  Neurologic: Non-focal  Extremities: No BLE edema  Vascular: Peripheral pulses palpable 2+ bilaterally        LABS:	 	  CBC Full  -  ( 07 Oct 2021 06:00 )  WBC Count : 6.83 K/uL  Hemoglobin : 8.4 g/dL  Hematocrit : 27.2 %  Platelet Count - Automated : 167 K/uL  Mean Cell Volume : 70.8 fl  Mean Cell Hemoglobin : 21.9 pg  Mean Cell Hemoglobin Concentration : 30.9 gm/dL    10-07    140  |  106  |  17  ----------------------------<  92  3.8   |  22  |  0.90  10-06    141  |  106  |  17  ----------------------------<  97  4.0   |  24  |  0.89    Ca    9.0      07 Oct 2021 06:00  Ca    9.1      06 Oct 2021 00:39    TPro  7.1  /  Alb  3.9  /  TBili  0.7  /  DBili  x   /  AST  24  /  ALT  11  /  AlkPhos  140<H>  10-06    proBNP: Serum Pro-Brain Natriuretic Peptide: 689 pg/mL (10.06.21 @ 00:39)  TSH: Thyroid Stimulating Hormone, Serum: 8.37 uIU/mL (10.06.21 @ 05:04)    TELEMETRY: AF 60-90s.     ECG:  AF rates ~60s, RBBB.   RADIOLOGY: < from: Xray Chest 1 View AP/PA (10.05.21 @ 23:48) >  IMPRESSION:    Cardiomegaly. Clear lungs.    < end of copied text >    PREVIOUS DIAGNOSTIC TESTING:    [ ] Echocardiogram: < from: Transthoracic Echocardiogram w/ Doppler (06.16.08 @ 11:00) >  Mitral Valve: Normal mitral valve.  Aortic Valve/Aorta: Mechanical aortic valve prosthesis.  Normal aortic root.  Left Atrium: Severe left atrial enlargement.  Left Ventricle: Concentric left ventricular hypertrophy.  Endocardium not well visualized; grossly normal left  ventricular function.  Right Heart: Severe right atrial enlargement.  Grossly normal right ventricular size and systolic  function.  Normal tricuspid and pulmonic valves.  Pericardium/Pleura: Normal pericardium with no pericardial  effusion.  Doppler: Minimal mitral regurgitation.  Minimal aortic regurgitation. Peak transaortic valve  gradient equals 37 mm Hg, mean transaortic valve gradient  equals 26mm Hg, which is probably normalin the setting of  a prosthetic valve.  Moderate tricuspid regurgitation. Estimated pulmonary  artery systolic pressure equals 32 mm Hg, assuming right  atrial pressure equals 10  mm Hg.  ------------------------------------------------------------------------  Conclusions:  1. Endocardium not well visualized; grossly normal left  ventricular function.  2. Grossly normal right ventricular size and systolic  function.  3. A mechanical prosthetic valve is present in the aortic  position and appearswell seated.  Minimal aortic  regurgitation. Peak transaortic valve gradient equals 37 mm  Hg, mean transaortic valve gradient equals 26mm Hg, which  is probably normal in the setting of a prosthetic valve.  4. Severe biatrial dilatation.    < end of copied text >    TTE 10/1/21 ([facility] records)  LVEF, by visual estimation, 50-55%  Low normal global LV systolic function  RV volume overload  Mildly reduced RV systolic function  RV size is severely enlarged  Mildly dilated Left atrium  Severely dilated right atrium  At least moderate eccentric MTR  Malcoapting tricuspid leaflets with torrestial TR  Aortic valve is normally functioning mechanical prosthetic valve.     [facility]  LVEF, by visual estimated is 50-55%  Normal global LV systolic function  RV size is severely enlarged  Mildly dilated LA  severely dilated RA  Mild thickening of the anterior and posterior mitral valve leaflets  Severe MVR  Aortic valve is normally functioning mechanical prosthetic valve  mechanical prosthesis in aortic valve position  Prosthetic graft in ascending aorta position  dilated pulmonary artery  pulmonary HTN is present  No LA/RENÉE thrombus seen  Small plaque involving transverse and descending aorta.     [ ]  Catheterization: < from: Cardiac Catheterization (10.06.21 @ 13:55) >    Diagnostic Findings:     Coronary Angiography   The coronary circulation is right dominant.      LM   Left main artery: Angiography shows no disease.      LAD   Left anterior descending artery: Angiography shows no disease.      Patient: EDISON ROBLES               MRN: [id number]  Study Date: 10/06/2021   01:55 PM      Page 1 of 3          CX   Circumflex: Angiography shows no disease.      RCA   Right coronary artery: Angiography shows no disease.      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 Jose Patterson), CAD s/p CABG in 2009, Paroxysmal Afib on coumadin (non-adherent), who admitted to [facility] with acute heart failure exacerbation/sob/LAU.  Patient states he has to stop and catch his breath when walking up stairs. On echo to have low normal EF 50-55%, Mild reduced RV fx, mod-severe MR and severe TR. Transferred to [facility] for open heart surgery with Dr. Hua. Patient endorses shortness of breath and LAU, denies chest pain, abdominal pain, fevers, chills, nausea.      #: [id number]  Pt states he presented to [facility] as he was SOB and was experiencing some chest pain. He reports palpitations but states they are there "all the time", not worsening. Pt is unsure of indication of pacemaker placement but states he remembers being SOB/fatigued and was "slow". He denies any hx of syncope, prior to or after pacemaker was placed. Pt has never had pacemaker interrogated since placement, does not have an outside cardiologist or EP clinic. He states he was prescribed Coumadin ~1 year ago, is not taking it regularly. Pt has a prescription bottle of Metoprolol 25mg, he states he is not taking that medication at home. Per medical records pt brought with him, he was prescribed Sotalol in 2008, states he never took it.     Per MDT: ANTONIA Dozier placed at [facility] by Dr. Jose Patterson in 7/7/2008. Indication: Tachy/arely syndrome.      Allergies  penicillin (Rash; Pruritus; Hives)  tell electrodes (Rash)  	    MEDICATIONS:  amLODIPine   Tablet 2.5 milliGRAM(s) Oral daily  furosemide    Tablet 40 milliGRAM(s) Oral daily  heparin  Infusion 750 Unit(s)/Hr IV Continuous <Continuous>  metoprolol succinate ER 25 milliGRAM(s) Oral daily  atorvastatin 40 milliGRAM(s) Oral at bedtime  ferrous    sulfate 325 milliGRAM(s) Oral daily  folic acid 1 milliGRAM(s) Oral daily  mupirocin 2% Ointment 1 Application(s) Topical two times a day  sodium chloride 0.9% lock flush 3 milliLiter(s) IV Push every 8 hours  thiamine 100 milliGRAM(s) Oral daily    PAST MEDICAL & SURGICAL HISTORY:  HTN (hypertension)  Complete heart block  CAD (coronary artery disease)  Paroxysmal atrial fibrillation  History of mechanical aortic valve replacement  History of pacemaker  S/P CABG (coronary artery bypass graft)      SOCIAL HISTORY:    Smoker - 3 cigs/day  Alcohol - 2 beers 2x/week      REVIEW OF SYSTEMS:  See HPI. Otherwise, 10 point ROS done and otherwise negative.    PHYSICAL EXAM:  T(C): 36.8 (10-07-21 @ 04:33), Max: 37 (10-06-21 @ 20:33)  HR: 63 (10-07-21 @ 04:33) (61 - 78)  BP: 99/56 (10-07-21 @ 04:33) (95/54 - 123/75)  RR: 18 (10-07-21 @ 04:33) (13 - 20)  SpO2: 97% (10-07-21 @ 04:33) (94% - 100%)  Wt(kg): --  I&O's Summary    06 Oct 2021 07:01  -  07 Oct 2021 07:00  --------------------------------------------------------  IN: 976 mL / OUT: 300 mL / NET: 676 mL    07 Oct 2021 07:01  -  07 Oct 2021 11:49  --------------------------------------------------------  IN: 480 mL / OUT: 0 mL / NET: 480 mL      Appearance: Normal	  HEENT: Atraumatic  Cardiovascular: Irregularly irregular  Respiratory: Lungs clear to auscultation	  Psychiatry: A & O x 3, Mood & affect appropriate  Gastrointestinal:  Soft, Non-tender	  Neurologic: Non-focal  Extremities: No BLE edema  Vascular: Peripheral pulses palpable 2+ bilaterally        LABS:	 	  CBC Full  -  ( 07 Oct 2021 06:00 )  WBC Count : 6.83 K/uL  Hemoglobin : 8.4 g/dL  Hematocrit : 27.2 %  Platelet Count - Automated : 167 K/uL  Mean Cell Volume : 70.8 fl  Mean Cell Hemoglobin : 21.9 pg  Mean Cell Hemoglobin Concentration : 30.9 gm/dL    10-07    140  |  106  |  17  ----------------------------<  92  3.8   |  22  |  0.90  10-06    141  |  106  |  17  ----------------------------<  97  4.0   |  24  |  0.89    Ca    9.0      07 Oct 2021 06:00  Ca    9.1      06 Oct 2021 00:39    TPro  7.1  /  Alb  3.9  /  TBili  0.7  /  DBili  x   /  AST  24  /  ALT  11  /  AlkPhos  140<H>  10-06    proBNP: Serum Pro-Brain Natriuretic Peptide: 689 pg/mL (10.06.21 @ 00:39)  TSH: Thyroid Stimulating Hormone, Serum: 8.37 uIU/mL (10.06.21 @ 05:04)    TELEMETRY: AF 60-90s.     ECG:  AF rates ~60s, RBBB.   RADIOLOGY: < from: Xray Chest 1 View AP/PA (10.05.21 @ 23:48) >  IMPRESSION:    Cardiomegaly. Clear lungs.    < end of copied text >    PREVIOUS DIAGNOSTIC TESTING:    [ ] Echocardiogram: < from: Transthoracic Echocardiogram w/ Doppler (06.16.08 @ 11:00) >  Mitral Valve: Normal mitral valve.  Aortic Valve/Aorta: Mechanical aortic valve prosthesis.  Normal aortic root.  Left Atrium: Severe left atrial enlargement.  Left Ventricle: Concentric left ventricular hypertrophy.  Endocardium not well visualized; grossly normal left  ventricular function.  Right Heart: Severe right atrial enlargement.  Grossly normal right ventricular size and systolic  function.  Normal tricuspid and pulmonic valves.  Pericardium/Pleura: Normal pericardium with no pericardial  effusion.  Doppler: Minimal mitral regurgitation.  Minimal aortic regurgitation. Peak transaortic valve  gradient equals 37 mm Hg, mean transaortic valve gradient  equals 26mm Hg, which is probably normalin the setting of  a prosthetic valve.  Moderate tricuspid regurgitation. Estimated pulmonary  artery systolic pressure equals 32 mm Hg, assuming right  atrial pressure equals 10  mm Hg.  ------------------------------------------------------------------------  Conclusions:  1. Endocardium not well visualized; grossly normal left  ventricular function.  2. Grossly normal right ventricular size and systolic  function.  3. A mechanical prosthetic valve is present in the aortic  position and appearswell seated.  Minimal aortic  regurgitation. Peak transaortic valve gradient equals 37 mm  Hg, mean transaortic valve gradient equals 26mm Hg, which  is probably normal in the setting of a prosthetic valve.  4. Severe biatrial dilatation.    < end of copied text >    TTE 10/1/21 ([facility] records)  LVEF, by visual estimation, 50-55%  Low normal global LV systolic function  RV volume overload  Mildly reduced RV systolic function  RV size is severely enlarged  Mildly dilated Left atrium  Severely dilated right atrium  At least moderate eccentric MTR  Malcoapting tricuspid leaflets with torrestial TR  Aortic valve is normally functioning mechanical prosthetic valve.     BRITT 10/4/21 ([facility] records)  LVEF, by visual estimated is 50-55%  Normal global LV systolic function  RV size is severely enlarged  Mildly dilated LA  severely dilated RA  Mild thickening of the anterior and posterior mitral valve leaflets  Severe MVR  Aortic valve is normally functioning mechanical prosthetic valve  mechanical prosthesis in aortic valve position  Prosthetic graft in ascending aorta position  dilated pulmonary artery  pulmonary HTN is present  No LA/RENÉE thrombus seen  Small plaque involving transverse and descending aorta.     [ ]  Catheterization: < from: Cardiac Catheterization (10.06.21 @ 13:55) >    Diagnostic Findings:     Coronary Angiography   The coronary circulation is right dominant.      LM   Left main artery: Angiography shows no disease.      LAD   Left anterior descending artery: Angiography shows no disease.      Patient: EDISON ROBLES               MRN: [id number]  Study Date: 10/06/2021   01:55 PM      Page 1 of 3          CX   Circumflex: Angiography shows no disease.      RCA   Right coronary artery: Angiography shows no disease.      < end of copied text >

## 2021-10-07 NOTE — PROGRESS NOTE ADULT - PROBLEM SELECTOR PLAN 4
s/p CABG 10 years ago per patient at [facility]   Continue ToprolXL 25 daily  Continue Atorvastatin 40 HS  10/6 [facility] normal coronaries

## 2021-10-07 NOTE — CONSULT NOTE ADULT - SUBJECTIVE AND OBJECTIVE BOX
HISTORY OF PRESENT ILLNESS:      Allergies  penicillin (Rash; Pruritus; Hives)  tell electrodes (Rash)      MEDICATIONS:  amLODIPine   Tablet 2.5 milliGRAM(s) Oral daily  furosemide    Tablet 40 milliGRAM(s) Oral daily  heparin  Infusion 750 Unit(s)/Hr IV Continuous <Continuous>  metoprolol succinate ER 25 milliGRAM(s) Oral daily  atorvastatin 40 milliGRAM(s) Oral at bedtime  ferrous    sulfate 325 milliGRAM(s) Oral daily  folic acid 1 milliGRAM(s) Oral daily  mupirocin 2% Ointment 1 Application(s) Topical two times a day  sodium chloride 0.9% lock flush 3 milliLiter(s) IV Push every 8 hours  thiamine 100 milliGRAM(s) Oral daily    PAST MEDICAL & SURGICAL HISTORY:  HTN (hypertension)  Complete heart block  CAD (coronary artery disease)  Paroxysmal atrial fibrillation  History of mechanical aortic valve replacement  History of pacemaker  S/P CABG (coronary artery bypass graft)      SOCIAL HISTORY:    [ ] Smoker  [ ] Alcohol      REVIEW OF SYSTEMS:  See HPI. Otherwise, 10 point ROS done and otherwise negative.    PHYSICAL EXAM:  T(C): 36.8 (10-07-21 @ 04:33), Max: 37 (10-06-21 @ 20:33)  HR: 63 (10-07-21 @ 04:33) (61 - 78)  BP: 99/56 (10-07-21 @ 04:33) (95/54 - 123/75)  RR: 18 (10-07-21 @ 04:33) (13 - 20)  SpO2: 97% (10-07-21 @ 04:33) (94% - 100%)  Wt(kg): --  I&O's Summary    06 Oct 2021 07:01  -  07 Oct 2021 07:00  --------------------------------------------------------  IN: 976 mL / OUT: 300 mL / NET: 676 mL    07 Oct 2021 07:01  -  07 Oct 2021 11:41  --------------------------------------------------------  IN: 480 mL / OUT: 0 mL / NET: 480 mL        Appearance: Normal	  HEENT:   Normal oral mucosa, PERRL, EOMI	  Lymphatic: No lymphadenopathy  Cardiovascular: Normal S1 S2, No JVD, No murmurs, No edema  Respiratory: Lungs clear to auscultation	  Psychiatry: A & O x 3, Mood & affect appropriate  Gastrointestinal:  Soft, Non-tender, + BS	  Skin: No rashes, No ecchymoses, No cyanosis	  Neurologic: Non-focal  Extremities: Normal range of motion, No clubbing, cyanosis or edema  Vascular: Peripheral pulses palpable 2+ bilaterally        LABS:	 	    CBC Full  -  ( 07 Oct 2021 06:00 )  WBC Count : 6.83 K/uL  Hemoglobin : 8.4 g/dL  Hematocrit : 27.2 %  Platelet Count - Automated : 167 K/uL  Mean Cell Volume : 70.8 fl  Mean Cell Hemoglobin : 21.9 pg  Mean Cell Hemoglobin Concentration : 30.9 gm/dL  Auto Neutrophil # : x  Auto Lymphocyte # : x  Auto Monocyte # : x  Auto Eosinophil # : x  Auto Basophil # : x  Auto Neutrophil % : x  Auto Lymphocyte % : x  Auto Monocyte % : x  Auto Eosinophil % : x  Auto Basophil % : x    10-07    140  |  106  |  17  ----------------------------<  92  3.8   |  22  |  0.90  10-06    141  |  106  |  17  ----------------------------<  97  4.0   |  24  |  0.89    Ca    9.0      07 Oct 2021 06:00  Ca    9.1      06 Oct 2021 00:39    TPro  7.1  /  Alb  3.9  /  TBili  0.7  /  DBili  x   /  AST  24  /  ALT  11  /  AlkPhos  140<H>  10-06      proBNP:   Lipid Profile:   HgA1c:   TSH:       CARDIAC MARKERS:                TELEMETRY: 	    ECG:  	  RADIOLOGY:  OTHER: 	    PREVIOUS DIAGNOSTIC TESTING:    [ ] Echocardiogram:  [ ]  Catheterization:  [ ] Stress Test:  	  	  ASSESSMENT/PLAN:

## 2021-10-07 NOTE — PROGRESS NOTE ADULT - PROBLEM SELECTOR PLAN 1
Patient with shortness of breath found to have at least mod MR & severe TR on echo done at OSH  Continue pre-op cardiac surgery workup  Check TTE & US Carotids  Continue Lasix 40mg PO QD  Strict I & Os  Daily weight  OR Monday 10/11 with Dr. Hua

## 2021-10-07 NOTE — CONSULT NOTE ADULT - ASSESSMENT
1. Severe MR/TR  2.  1. Severe MR/TR  2. 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 AF  4. h/o mechanical AVR 2008  5. CAD s/p CABG 2009  6. HTN    - Pt is scheduled for surgery with Dr. Hua 10/11  - Attempted to interrogate pacemaker today. No response with Magnet. Battery is EOL, implanted in 2008. Pt reports he has never had device interrogated since implant.   - AF with well controlled rates ~60-70s. Noncompliant with Coumadin or Metoprolol at home. No bradycardia on tele.  - TSH 8.3. Please obtain FT4, endocrine consult if hypothyroid.  - On heparin gtt.      1. Severe MR/TR  2. 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 AF  4. h/o mechanical AVR 2008  5. CAD s/p CABG 2009  6. HTN    - Pt is scheduled for surgery with Dr. Hua 10/11  - Attempted to interrogate pacemaker today. No response with Magnet. Battery is EOL, implanted in 2008. Pt reports he has never had device interrogated since implant.   - AF with well controlled rates ~60-70s. Noncompliant with Coumadin or Metoprolol at home. No bradycardia on tele.  - TSH 8.3. Please obtain FT4, endocrine consult if hypothyroid.  - On heparin gtt.   - Potentially change pulse generator during surgery on Monday. Will discuss with EP attending.    1. Severe MR/TR  2. 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 AF  4. h/o mechanical AVR 2008  5. CAD s/p CABG 2009  6. HTN    - Pt is scheduled for surgery with Dr. Hua 10/11  - Attempted to interrogate pacemaker today. No response with Magnet. Battery is EOL, implanted in 2008. Pt reports he has never had device interrogated since implant.   - AF with well controlled rates ~60-70s. Noncompliant with Coumadin or Metoprolol at home. No bradycardia on tele.  - TSH 8.3. Please obtain FT4, endocrine consult if hypothyroid.  - On heparin gtt.   - CXR with severely dilated RV. Pacemaker has likely been EOL for ~2-3 years, unsure if leads are appropriately capturing. If receiving TVR, may need RV lead extraction. ?Micra for backup pacing. Will discuss with EP attending.    1. Severe MR/TR, severely dilated RV  2. 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 AF  4. h/o mechanical AVR 2008  5. CAD s/p CABG 2009  6. HTN    - Pt is scheduled for surgery with Dr. Hua 10/11  - Attempted to interrogate pacemaker today. No response with Magnet. Battery is EOL, implanted in 2008. Pt reports he has never had device interrogated since implant.   - AF with well controlled rates ~60-70s. Noncompliant with Coumadin or Metoprolol at home. No bradycardia on tele.  - TSH 8.3. Please obtain FT4, endocrine consult if hypothyroid.  - On heparin gtt.   - CXR with severely dilated RV. Pacemaker has likely been EOL for ~2-3 years, unsure if leads are appropriately capturing. If receiving TVR, may need lead extraction.   - Dr. Crenshaw discussed with Dr. Hua. Pt's RA and RV leads will be cut during procedure 10/11 and LV epicardial lead will be placed. At a later date, will replace pulse generator, extract RA & RV leads, and connect the epicardial lead to the pulse generator.

## 2021-10-07 NOTE — PROGRESS NOTE ADULT - SUBJECTIVE AND OBJECTIVE BOX
Subjective: Pt states "Hello" denies any CP or SOB. No acute events overnight.     Telemetry:  Afib 60 - 80  Vital Signs Last 24 Hrs  T(C): 36.8 (10-07-21 @ 04:33), Max: 37 (10-06-21 @ 20:33)  T(F): 98.2 (10-07-21 @ 04:33), Max: 98.6 (10-06-21 @ 20:33)  HR: 63 (10-07-21 @ 04:33) (61 - 78)  BP: 99/56 (10-07-21 @ 04:33) (95/54 - 123/75)  RR: 18 (10-07-21 @ 04:33) (13 - 20)  SpO2: 97% (10-07-21 @ 04:33) (94% - 100%)             10-06 @ 07:01  -  10-07 @ 07:00  --------------------------------------------------------  IN: 976 mL / OUT: 300 mL / NET: 676 mL      Daily Weight in k.5 (07 Oct 2021 07:50)                        8.4    6.83  )-----------( 167      ( 07 Oct 2021 06:00 )             27.2     140  |  106  |  17  ----------------------------<  92  3.8   |  22  |  0.90      PHYSICAL EXAM  Neurology: A&Ox3, NAD  CV : RRR+S1S2  Lungs: Respirations non-labored, B/L BS CTA  Abdomen: Soft, NT/ND, +BSx4Q  : Voiding without difficulty  Extremities: B/L LE no edema, negative calf tenderness, +PP                     +Right groin incision with DSD CDI          MEDICATIONS  amLODIPine   Tablet 2.5 milliGRAM(s) Oral daily  atorvastatin 40 milliGRAM(s) Oral at bedtime  ferrous    sulfate 325 milliGRAM(s) Oral daily  folic acid 1 milliGRAM(s) Oral daily  furosemide    Tablet 40 milliGRAM(s) Oral daily  heparin  Infusion 750 Unit(s)/Hr IV Continuous <Continuous>  metoprolol succinate ER 25 milliGRAM(s) Oral daily  sodium chloride 0.9% lock flush 3 milliLiter(s) IV Push every 8 hours  thiamine 100 milliGRAM(s) Oral daily        Discussed with Cardiothoracic Team at AM rounds.

## 2021-10-07 NOTE — PROGRESS NOTE ADULT - ASSESSMENT
Patient is a 70M PMH HTN, CAD s/p CABG 10 years ago per patient , Complete Heart Block s/p PPM placement, Paroxysmal Afib on coumadin, Aortic stenosis s/p Mechanical AVR who admitted to OSH with acute heart failure exacerbation found on echo to have low normal EF 50-55%, severely dilated RA/RV/pulm HTN reduced RV fx, severe MR and severe TR. Transferred to [facility] for open heart surgery with Dr. Hua. Pre-op workup initiated.  Dr. Hua to review BRITT films from outside hospital, patient came with disc.  Patient will need CT Chest Non-Con and cardiac cath as part of pre-operative evaluation.  Currently patient is hemodynamically stable, rate controlled afib on tele and is chest pain/shortness of breath free on room air oxygen.  Coumadin held at this time and is being anticoagulated with heparin gtt given hx of afib/mechanical valve and need for cardiac cath.      10/6 VSS, CT chest emphysema, s/p LHC normal coronaries.  10/7 VSS, f/u echo and carotids today, EP called for PPM battery change.

## 2021-10-08 LAB
ANION GAP SERPL CALC-SCNC: 10 MMOL/L — SIGNIFICANT CHANGE UP (ref 5–17)
APTT BLD: 82.6 SEC — HIGH (ref 27.5–35.5)
BUN SERPL-MCNC: 22 MG/DL — SIGNIFICANT CHANGE UP (ref 7–23)
CALCIUM SERPL-MCNC: 8.7 MG/DL — SIGNIFICANT CHANGE UP (ref 8.4–10.5)
CHLORIDE SERPL-SCNC: 104 MMOL/L — SIGNIFICANT CHANGE UP (ref 96–108)
CO2 SERPL-SCNC: 24 MMOL/L — SIGNIFICANT CHANGE UP (ref 22–31)
CREAT SERPL-MCNC: 0.94 MG/DL — SIGNIFICANT CHANGE UP (ref 0.5–1.3)
GLUCOSE BLDC GLUCOMTR-MCNC: 153 MG/DL — HIGH (ref 70–99)
GLUCOSE BLDC GLUCOMTR-MCNC: 97 MG/DL — SIGNIFICANT CHANGE UP (ref 70–99)
GLUCOSE SERPL-MCNC: 110 MG/DL — HIGH (ref 70–99)
HCT VFR BLD CALC: 27.3 % — LOW (ref 39–50)
HGB BLD-MCNC: 8.1 G/DL — LOW (ref 13–17)
MCHC RBC-ENTMCNC: 21.1 PG — LOW (ref 27–34)
MCHC RBC-ENTMCNC: 29.7 GM/DL — LOW (ref 32–36)
MCV RBC AUTO: 71.3 FL — LOW (ref 80–100)
NRBC # BLD: 0 /100 WBCS — SIGNIFICANT CHANGE UP (ref 0–0)
PLATELET # BLD AUTO: 162 K/UL — SIGNIFICANT CHANGE UP (ref 150–400)
POTASSIUM SERPL-MCNC: 3.8 MMOL/L — SIGNIFICANT CHANGE UP (ref 3.5–5.3)
POTASSIUM SERPL-SCNC: 3.8 MMOL/L — SIGNIFICANT CHANGE UP (ref 3.5–5.3)
RBC # BLD: 3.83 M/UL — LOW (ref 4.2–5.8)
RBC # FLD: 19.9 % — HIGH (ref 10.3–14.5)
SODIUM SERPL-SCNC: 138 MMOL/L — SIGNIFICANT CHANGE UP (ref 135–145)
WBC # BLD: 7.31 K/UL — SIGNIFICANT CHANGE UP (ref 3.8–10.5)
WBC # FLD AUTO: 7.31 K/UL — SIGNIFICANT CHANGE UP (ref 3.8–10.5)

## 2021-10-08 PROCEDURE — 99232 SBSQ HOSP IP/OBS MODERATE 35: CPT

## 2021-10-08 PROCEDURE — 73030 X-RAY EXAM OF SHOULDER: CPT | Mod: 26,RT

## 2021-10-08 PROCEDURE — 75572 CT HRT W/3D IMAGE: CPT | Mod: 26

## 2021-10-08 PROCEDURE — 99233 SBSQ HOSP IP/OBS HIGH 50: CPT

## 2021-10-08 PROCEDURE — 93306 TTE W/DOPPLER COMPLETE: CPT | Mod: 26

## 2021-10-08 RX ORDER — LIDOCAINE 4 G/100G
1 CREAM TOPICAL DAILY
Refills: 0 | Status: DISCONTINUED | OUTPATIENT
Start: 2021-10-08 | End: 2021-10-11

## 2021-10-08 RX ORDER — ACETAMINOPHEN 500 MG
975 TABLET ORAL ONCE
Refills: 0 | Status: COMPLETED | OUTPATIENT
Start: 2021-10-08 | End: 2021-10-08

## 2021-10-08 RX ORDER — POTASSIUM CHLORIDE 20 MEQ
40 PACKET (EA) ORAL ONCE
Refills: 0 | Status: COMPLETED | OUTPATIENT
Start: 2021-10-08 | End: 2021-10-08

## 2021-10-08 RX ADMIN — Medication 40 MILLIEQUIVALENT(S): at 17:31

## 2021-10-08 RX ADMIN — HEPARIN SODIUM 9.5 UNIT(S)/HR: 5000 INJECTION INTRAVENOUS; SUBCUTANEOUS at 12:42

## 2021-10-08 RX ADMIN — MUPIROCIN 1 APPLICATION(S): 20 OINTMENT TOPICAL at 05:27

## 2021-10-08 RX ADMIN — Medication 975 MILLIGRAM(S): at 17:36

## 2021-10-08 RX ADMIN — Medication 1 MILLIGRAM(S): at 12:42

## 2021-10-08 RX ADMIN — LIDOCAINE 1 PATCH: 4 CREAM TOPICAL at 17:32

## 2021-10-08 RX ADMIN — Medication 100 MILLIGRAM(S): at 12:42

## 2021-10-08 RX ADMIN — Medication 40 MILLIGRAM(S): at 05:27

## 2021-10-08 RX ADMIN — Medication 975 MILLIGRAM(S): at 18:06

## 2021-10-08 RX ADMIN — LIDOCAINE 1 PATCH: 4 CREAM TOPICAL at 22:20

## 2021-10-08 RX ADMIN — SODIUM CHLORIDE 3 MILLILITER(S): 9 INJECTION INTRAMUSCULAR; INTRAVENOUS; SUBCUTANEOUS at 15:01

## 2021-10-08 RX ADMIN — SODIUM CHLORIDE 3 MILLILITER(S): 9 INJECTION INTRAMUSCULAR; INTRAVENOUS; SUBCUTANEOUS at 06:12

## 2021-10-08 RX ADMIN — MUPIROCIN 1 APPLICATION(S): 20 OINTMENT TOPICAL at 17:35

## 2021-10-08 RX ADMIN — SODIUM CHLORIDE 3 MILLILITER(S): 9 INJECTION INTRAMUSCULAR; INTRAVENOUS; SUBCUTANEOUS at 22:19

## 2021-10-08 RX ADMIN — Medication 325 MILLIGRAM(S): at 12:42

## 2021-10-08 RX ADMIN — ATORVASTATIN CALCIUM 40 MILLIGRAM(S): 80 TABLET, FILM COATED ORAL at 22:19

## 2021-10-08 NOTE — PROGRESS NOTE ADULT - SUBJECTIVE AND OBJECTIVE BOX
Subjective: "My shoulder hurts, don't know what happened"  Pt with limited mobility pain/stiffness  No soft tissue swelling      Tele: Afib  80s                               T(C): 37 (10-08-21 @ 14:58), Max: 37.1 (10-08-21 @ 04:01)  HR: 68 (10-08-21 @ 14:58) (68 - 77)  BP: 100/62 (10-08-21 @ 14:58) (94/69 - 105/67)  RR: 18 (10-08-21 @ 14:58) (17 - 18)  SpO2: 98% (10-08-21 @ 14:58) (96% - 100%)        10-08    138  |  104  |  22  ----------------------------<  110<H>  3.8   |  24  |  0.94    Ca    8.7      08 Oct 2021 06:38                                 8.1    7.31  )-----------( 162      ( 08 Oct 2021 06:38 )             27.3        PTT - ( 08 Oct 2021 06:38 )  PTT:82.6 sec    CAPILLARY BLOOD GLUCOSE      POCT Blood Glucose.: 153 mg/dL (08 Oct 2021 11:25)  POCT Blood Glucose.: 97 mg/dL (08 Oct 2021 08:26)           Assessment    Neurology: A&Ox3, NAD    CV : RRR+S1S2    Lungs: Respirations non-labored, B/L BS CTA    Abdomen: Soft, NT/ND, +BSx4Q    : Voiding without difficulty    Extremities: B/L LE no edema, negative calf tenderness, +PP                     +Right groin incision with DSD CDI          MEDICATIONS  (STANDING):  acetaminophen   Tablet .. 975 milliGRAM(s) Oral once  amLODIPine   Tablet 2.5 milliGRAM(s) Oral daily  atorvastatin 40 milliGRAM(s) Oral at bedtime  ferrous    sulfate 325 milliGRAM(s) Oral daily  folic acid 1 milliGRAM(s) Oral daily  furosemide    Tablet 40 milliGRAM(s) Oral daily  heparin  Infusion 750 Unit(s)/Hr (9.5 mL/Hr) IV Continuous <Continuous>  lidocaine   4% Patch 1 Patch Transdermal daily  metoprolol succinate ER 25 milliGRAM(s) Oral daily  mupirocin 2% Ointment 1 Application(s) Topical two times a day  potassium chloride    Tablet ER 40 milliEquivalent(s) Oral once  sodium chloride 0.9% lock flush 3 milliLiter(s) IV Push every 8 hours  thiamine 100 milliGRAM(s) Oral daily       PAST MEDICAL & SURGICAL HISTORY:  HTN (hypertension)    Complete heart block    CAD (coronary artery disease)    Paroxysmal atrial fibrillation    History of mechanical aortic valve replacement    History of pacemaker    S/P CABG (coronary artery bypass graft)

## 2021-10-08 NOTE — PROGRESS NOTE ADULT - PROBLEM SELECTOR PLAN 1
Patient with shortness of breath found to have at least mod MR & severe TR on echo done at OSH  Continue pre-op cardiac surgery workup  Followup CTA, Echo  Continue Lasix 40mg PO QD  Strict I & Os  Daily weight  OR Monday 10/11 with Dr. Hua

## 2021-10-08 NOTE — PROGRESS NOTE ADULT - SUBJECTIVE AND OBJECTIVE BOX
24H hour events: AF 60-70's overnight, no other events on tele     MEDICATIONS:  amLODIPine   Tablet 2.5 milliGRAM(s) Oral daily  furosemide    Tablet 40 milliGRAM(s) Oral daily  heparin  Infusion 750 Unit(s)/Hr IV Continuous <Continuous>  metoprolol succinate ER 25 milliGRAM(s) Oral daily  atorvastatin 40 milliGRAM(s) Oral at bedtime    ferrous    sulfate 325 milliGRAM(s) Oral daily  folic acid 1 milliGRAM(s) Oral daily  mupirocin 2% Ointment 1 Application(s) Topical two times a day  sodium chloride 0.9% lock flush 3 milliLiter(s) IV Push every 8 hours  thiamine 100 milliGRAM(s) Oral daily      REVIEW OF SYSTEMS:  See HPI, otherwise ROS negative.    PHYSICAL EXAM:  T(C): 37.1 (10-08-21 @ 04:01), Max: 37.1 (10-08-21 @ 04:01)  HR: 77 (10-08-21 @ 04:01) (68 - 77)  BP: 94/69 (10-08-21 @ 04:01) (94/69 - 105/67)  RR: 18 (10-08-21 @ 04:01) (18 - 18)  SpO2: 96% (10-08-21 @ 04:01) (96% - 99%)  Wt(kg): --  I&O's Summary    07 Oct 2021 07:01  -  08 Oct 2021 07:00  --------------------------------------------------------  IN: 1749 mL / OUT: 0 mL / NET: 1749 mL        Appearance: Alert. NAD	  Cardiovascular: +S1S2 irregular  Respiratory: CTA B/L	  Gastrointestinal:  Soft, NT. ND. +BS	  Extremities: No edema BLE  Vascular: Peripheral pulses palpable 2+ bilaterally      LABS:	 	    CBC Full  -  ( 08 Oct 2021 06:38 )  WBC Count : 7.31 K/uL  Hemoglobin : 8.1 g/dL  Hematocrit : 27.3 %  Platelet Count - Automated : 162 K/uL  Mean Cell Volume : 71.3 fl  Mean Cell Hemoglobin : 21.1 pg  Mean Cell Hemoglobin Concentration : 29.7 gm/dL  Auto Neutrophil # : x  Auto Lymphocyte # : x  Auto Monocyte # : x  Auto Eosinophil # : x  Auto Basophil # : x  Auto Neutrophil % : x  Auto Lymphocyte % : x  Auto Monocyte % : x  Auto Eosinophil % : x  Auto Basophil % : x    10-08    138  |  104  |  22  ----------------------------<  110<H>  3.8   |  24  |  0.94  10-07    140  |  106  |  17  ----------------------------<  92  3.8   |  22  |  0.90    Ca    8.7      08 Oct 2021 06:38  Ca    9.0      07 Oct 2021 06:00        proBNP: Serum Pro-Brain Natriuretic Peptide: 689 pg/mL (10-06 @ 00:39)    Lipid Profile:   HgA1c:   TSH:       CARDIAC MARKERS:          TELEMETRY:   	    ECG:  	    RADIOLOGY:    	  ASSESSMENT/PLAN: 	     24H hour events: AF 60-70's overnight, no other events on tele     MEDICATIONS:  amLODIPine   Tablet 2.5 milliGRAM(s) Oral daily  furosemide    Tablet 40 milliGRAM(s) Oral daily  heparin  Infusion 750 Unit(s)/Hr IV Continuous <Continuous>  metoprolol succinate ER 25 milliGRAM(s) Oral daily  atorvastatin 40 milliGRAM(s) Oral at bedtime    ferrous    sulfate 325 milliGRAM(s) Oral daily  folic acid 1 milliGRAM(s) Oral daily  mupirocin 2% Ointment 1 Application(s) Topical two times a day  sodium chloride 0.9% lock flush 3 milliLiter(s) IV Push every 8 hours  thiamine 100 milliGRAM(s) Oral daily      REVIEW OF SYSTEMS:  See HPI, otherwise ROS negative.    PHYSICAL EXAM:  T(C): 37.1 (10-08-21 @ 04:01), Max: 37.1 (10-08-21 @ 04:01)  HR: 77 (10-08-21 @ 04:01) (68 - 77)  BP: 94/69 (10-08-21 @ 04:01) (94/69 - 105/67)  RR: 18 (10-08-21 @ 04:01) (18 - 18)  SpO2: 96% (10-08-21 @ 04:01) (96% - 99%)  Wt(kg): --  I&O's Summary    07 Oct 2021 07:01  -  08 Oct 2021 07:00  --------------------------------------------------------  IN: 1749 mL / OUT: 0 mL / NET: 1749 mL        Appearance: Alert. NAD	  Cardiovascular: +S1S2 irregular  Respiratory: CTA B/L	  Gastrointestinal:  Soft, NT. ND. +BS	  Extremities: No edema BLE  Vascular: Peripheral pulses palpable 2+ bilaterally      LABS:	 	    CBC Full  -  ( 08 Oct 2021 06:38 )  WBC Count : 7.31 K/uL  Hemoglobin : 8.1 g/dL  Hematocrit : 27.3 %  Platelet Count - Automated : 162 K/uL  Mean Cell Volume : 71.3 fl  Mean Cell Hemoglobin : 21.1 pg  Mean Cell Hemoglobin Concentration : 29.7 gm/dL  Auto Neutrophil # : x  Auto Lymphocyte # : x  Auto Monocyte # : x  Auto Eosinophil # : x  Auto Basophil # : x  Auto Neutrophil % : x  Auto Lymphocyte % : x  Auto Monocyte % : x  Auto Eosinophil % : x  Auto Basophil % : x    10-08    138  |  104  |  22  ----------------------------<  110<H>  3.8   |  24  |  0.94  10-07    140  |  106  |  17  ----------------------------<  92  3.8   |  22  |  0.90    Ca    8.7      08 Oct 2021 06:38  Ca    9.0      07 Oct 2021 06:00        proBNP: Serum Pro-Brain Natriuretic Peptide: 689 pg/mL (10-06 @ 00:39)      TELEMETRY: AF 60-70's w/o ectopy   	    ECG:  	< from: 12 Lead ECG (10.06.21 @ 01:04) >  Ventricular Rate 70 BPM    Atrial Rate 42 BPM    QRS Duration 182 ms    Q-T Interval 498 ms    QTC Calculation(Bazett) 537 ms    R Axis -17 degrees    T Axis 15 degrees    Diagnosis Line ATRIAL FIBRILLATION  RIGHT BUNDLE BRANCH BLOCK  ABNORMAL ECG  NO PREVIOUS ECGS AVAILABLE  Confirmed by DAVID PATEL, MANUELFormerly [facility] HospitalS ([id number]) on 10/6/2021 9:09:57 AM    < end of copied text >      RADIOLOGY: < from: Xray Chest 1 View AP/PA (10.05.21 @ 23:48) >  FINDINGS:    Support devices: A pacemaker overlies the left chest wall with its leads intact.    Cardiac/mediastinum/hilum: Median sternotomy wires. The heart is massively enlarged.    Lung parenchyma/Pleura: The lungs are clear. There is no pleural effusion. There is no pneumothorax.    Skeleton/soft tissues: No acute osseous abnormalities.    IMPRESSION:    Cardiomegaly. Clear lungs.    < end of copied text >

## 2021-10-08 NOTE — PROGRESS NOTE ADULT - ASSESSMENT
Patient is a 70M PMH HTN, CAD s/p CABG 10 years ago per patient , Complete Heart Block s/p PPM placement, Paroxysmal Afib on coumadin, Aortic stenosis s/p Mechanical AVR who admitted to OSH with acute heart failure exacerbation found on echo to have low normal EF 50-55%, severely dilated RA/RV/pulm HTN reduced RV fx, severe MR and severe TR. Transferred to [facility] for open heart surgery with Dr. Hua. Pre-op workup initiated.  Dr. Hua to review BRITT films from outside hospital, patient came with disc.  Patient will need CT Chest Non-Con and cardiac cath as part of pre-operative evaluation.  Currently patient is hemodynamically stable, rate controlled afib on tele and is chest pain/shortness of breath free on room air oxygen.  Coumadin held at this time and is being anticoagulated with heparin gtt given hx of afib/ mechanical valve and need for cardiac cath.      10/6 VSS, CT chest emphysema, s/p LHC normal coronaries.  10/7 VSS, f/u echo and carotids today, EP called for PPM battery change.  10/8 Cardiac CT  Echo completed   R shoulder stiffness> xray ordered, lidoderm patch

## 2021-10-08 NOTE — PROGRESS NOTE ADULT - ASSESSMENT
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 Jose Patterson), CAD s/p CABG in 2009, Paroxysmal AF on Coumadin (non-compliant with A/C and Metoprolol), who admitted to [facility] with acute heart failure exacerbation/sob/LAU.  Patient states he has to stop and catch his breath when walking up stairs. On TTE found to have low normal EF 50-55%, mildly reduced RV function, mod-severe MR and severe TR. He was transferred to [facility] for open heart surgery with Dr. Hua. As per patient, he has not followed up regarding his pacemaker. Attempt made to interrogate it on this admission revealing it is EOL, no response with  or magnet.       1. Severe MR/TR, severely dilated RV  2. 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 AF  4. h/o mechanical AVR 2008  5. CAD s/p CABG 2009  6. HTN    - Scheduled for OR with Dr. Hua on 10/11 for MVR/TVR.   - Dr. Crenshaw discussed with Dr. Hua regarding PPM. Plan for RA and RV leads to be cut during procedure and LV epicardial lead will be placed. At a later date, will replace pulse generator, extract RA & RV leads, and connect the epicardial lead to the pulse generator.   - Continue Heparin gtt for AF, well rate controlled on Toprol. Continue current management for now

## 2021-10-08 NOTE — PROGRESS NOTE ADULT - PROBLEM SELECTOR PLAN 4
s/p CABG 10 years ago per patient at [facility]   Continue Toprol XL 25 daily  Continue Atorvastatin 40 HS  10/6 [facility] normal coronaries

## 2021-10-09 LAB
ALBUMIN SERPL ELPH-MCNC: 3.5 G/DL — SIGNIFICANT CHANGE UP (ref 3.3–5)
ALP SERPL-CCNC: 148 U/L — HIGH (ref 40–120)
ALT FLD-CCNC: 11 U/L — SIGNIFICANT CHANGE UP (ref 10–45)
ANION GAP SERPL CALC-SCNC: 13 MMOL/L — SIGNIFICANT CHANGE UP (ref 5–17)
APTT BLD: 85.8 SEC — HIGH (ref 27.5–35.5)
AST SERPL-CCNC: 25 U/L — SIGNIFICANT CHANGE UP (ref 10–40)
BILIRUB SERPL-MCNC: 0.7 MG/DL — SIGNIFICANT CHANGE UP (ref 0.2–1.2)
BUN SERPL-MCNC: 19 MG/DL — SIGNIFICANT CHANGE UP (ref 7–23)
CALCIUM SERPL-MCNC: 8.8 MG/DL — SIGNIFICANT CHANGE UP (ref 8.4–10.5)
CHLORIDE SERPL-SCNC: 104 MMOL/L — SIGNIFICANT CHANGE UP (ref 96–108)
CO2 SERPL-SCNC: 21 MMOL/L — LOW (ref 22–31)
CREAT SERPL-MCNC: 0.88 MG/DL — SIGNIFICANT CHANGE UP (ref 0.5–1.3)
GLUCOSE SERPL-MCNC: 93 MG/DL — SIGNIFICANT CHANGE UP (ref 70–99)
HCT VFR BLD CALC: 28.4 % — LOW (ref 39–50)
HGB BLD-MCNC: 8.5 G/DL — LOW (ref 13–17)
MCHC RBC-ENTMCNC: 21.4 PG — LOW (ref 27–34)
MCHC RBC-ENTMCNC: 29.9 GM/DL — LOW (ref 32–36)
MCV RBC AUTO: 71.4 FL — LOW (ref 80–100)
NRBC # BLD: 0 /100 WBCS — SIGNIFICANT CHANGE UP (ref 0–0)
PLATELET # BLD AUTO: 176 K/UL — SIGNIFICANT CHANGE UP (ref 150–400)
POTASSIUM SERPL-MCNC: 4.3 MMOL/L — SIGNIFICANT CHANGE UP (ref 3.5–5.3)
POTASSIUM SERPL-SCNC: 4.3 MMOL/L — SIGNIFICANT CHANGE UP (ref 3.5–5.3)
PROT SERPL-MCNC: 6.7 G/DL — SIGNIFICANT CHANGE UP (ref 6–8.3)
RBC # BLD: 3.98 M/UL — LOW (ref 4.2–5.8)
RBC # FLD: 20.5 % — HIGH (ref 10.3–14.5)
SODIUM SERPL-SCNC: 138 MMOL/L — SIGNIFICANT CHANGE UP (ref 135–145)
WBC # BLD: 6.77 K/UL — SIGNIFICANT CHANGE UP (ref 3.8–10.5)
WBC # FLD AUTO: 6.77 K/UL — SIGNIFICANT CHANGE UP (ref 3.8–10.5)

## 2021-10-09 PROCEDURE — 99232 SBSQ HOSP IP/OBS MODERATE 35: CPT

## 2021-10-09 RX ORDER — ACETAMINOPHEN 500 MG
1000 TABLET ORAL ONCE
Refills: 0 | Status: COMPLETED | OUTPATIENT
Start: 2021-10-09 | End: 2021-10-09

## 2021-10-09 RX ADMIN — AMLODIPINE BESYLATE 2.5 MILLIGRAM(S): 2.5 TABLET ORAL at 05:09

## 2021-10-09 RX ADMIN — Medication 400 MILLIGRAM(S): at 22:00

## 2021-10-09 RX ADMIN — SODIUM CHLORIDE 3 MILLILITER(S): 9 INJECTION INTRAMUSCULAR; INTRAVENOUS; SUBCUTANEOUS at 07:52

## 2021-10-09 RX ADMIN — LIDOCAINE 1 PATCH: 4 CREAM TOPICAL at 18:01

## 2021-10-09 RX ADMIN — LIDOCAINE 1 PATCH: 4 CREAM TOPICAL at 11:36

## 2021-10-09 RX ADMIN — Medication 1000 MILLIGRAM(S): at 22:30

## 2021-10-09 RX ADMIN — Medication 25 MILLIGRAM(S): at 05:09

## 2021-10-09 RX ADMIN — LIDOCAINE 1 PATCH: 4 CREAM TOPICAL at 05:12

## 2021-10-09 RX ADMIN — SODIUM CHLORIDE 3 MILLILITER(S): 9 INJECTION INTRAMUSCULAR; INTRAVENOUS; SUBCUTANEOUS at 21:41

## 2021-10-09 RX ADMIN — MUPIROCIN 1 APPLICATION(S): 20 OINTMENT TOPICAL at 05:12

## 2021-10-09 RX ADMIN — Medication 40 MILLIGRAM(S): at 05:09

## 2021-10-09 RX ADMIN — MUPIROCIN 1 APPLICATION(S): 20 OINTMENT TOPICAL at 17:41

## 2021-10-09 RX ADMIN — SODIUM CHLORIDE 3 MILLILITER(S): 9 INJECTION INTRAMUSCULAR; INTRAVENOUS; SUBCUTANEOUS at 15:15

## 2021-10-09 RX ADMIN — ATORVASTATIN CALCIUM 40 MILLIGRAM(S): 80 TABLET, FILM COATED ORAL at 21:41

## 2021-10-09 RX ADMIN — Medication 1 MILLIGRAM(S): at 11:30

## 2021-10-09 RX ADMIN — Medication 100 MILLIGRAM(S): at 11:30

## 2021-10-09 RX ADMIN — HEPARIN SODIUM 9.5 UNIT(S)/HR: 5000 INJECTION INTRAVENOUS; SUBCUTANEOUS at 08:29

## 2021-10-09 RX ADMIN — LIDOCAINE 1 PATCH: 4 CREAM TOPICAL at 23:00

## 2021-10-09 RX ADMIN — Medication 325 MILLIGRAM(S): at 11:30

## 2021-10-09 NOTE — PROGRESS NOTE ADULT - PROBLEM SELECTOR PLAN 5
Patient with shortness of breath found to have at least mod MR on TTE done at OSH  Check TTE  Continue Lasix 40mg PO QD  Strict I & Os  Daily weight
HX of Mechanical AVR from 2008  On coumadin at hold-- INR 1.18  Continue AC with Heparin gtt  aptt goal 70 -90  Non-con CT Chest 10/6 emphysema
HX of Mechanical AVR from 2008  On coumadin at hold-- INR 1.18  Continue AC with Heparin gtt  aptt goal 70 -90  Non-con CT Chest 10/6 emphysema
HX of Mechanical AVR from 2008  coumadin at hold  Continue AC with Heparin gtt  aptt goal 70 -90  Non-con CT Chest 10/6 emphysema

## 2021-10-09 NOTE — PROGRESS NOTE ADULT - PROBLEM SELECTOR PLAN 6
Rate controlled on atrial fibrillation on tele   Continue beta blocker  On coumadin at home, will anticoagulate with heparin gtt for now  Keep K+ >4 Mg>2
Found to have torrential TR on TTE done at [facility]  Continue Lasix 40mg PO QD  Strict I & Os  Daily weight

## 2021-10-09 NOTE — PROGRESS NOTE ADULT - PROBLEM SELECTOR PROBLEM 5
H/O mechanical aortic valve replacement
Mitral regurgitation
H/O mechanical aortic valve replacement
H/O mechanical aortic valve replacement

## 2021-10-09 NOTE — PROGRESS NOTE ADULT - SUBJECTIVE AND OBJECTIVE BOX
Subjective: "Hello"  Pt sleeping in bed, "better"    Tele:   Afib 70s                             T(C): 36.8 (10-09-21 @ 04:31), Max: 37 (10-08-21 @ 14:58)  HR: 75 (10-09-21 @ 04:31) (68 - 75)  BP: 112/73 (10-09-21 @ 04:31) (100/62 - 127/77)  RR: 18 (10-09-21 @ 04:31) (18 - 18)  SpO2: 98% (10-09-21 @ 04:31) (98% - 100%)        10-09    138  |  104  |  19  ----------------------------<  93  4.3   |  21<L>  |  0.88    Ca    8.8      09 Oct 2021 05:41    TPro  6.7  /  Alb  3.5  /  TBili  0.7  /  DBili  x   /  AST  25  /  ALT  11  /  AlkPhos  148<H>  10-09                               8.5    6.77  )-----------( 176      ( 09 Oct 2021 05:41 )             28.4        PTT - ( 09 Oct 2021 05:41 )  PTT:85.8 sec      Assessment    Neurology: A&Ox3, NAD    CV : RRR+S1S2    Lungs: Respirations non-labored, B/L BS CTA    Abdomen: Soft, NT/ND, +BSx4Q    : Voiding without difficulty    Extremities: B/L LE no edema, negative calf tenderness, +PP                     +Right groin incision with DSD CDI        MEDICATIONS  (STANDING):  amLODIPine   Tablet 2.5 milliGRAM(s) Oral daily  atorvastatin 40 milliGRAM(s) Oral at bedtime  ferrous    sulfate 325 milliGRAM(s) Oral daily  folic acid 1 milliGRAM(s) Oral daily  furosemide    Tablet 40 milliGRAM(s) Oral daily  heparin  Infusion 750 Unit(s)/Hr (9.5 mL/Hr) IV Continuous <Continuous>  lidocaine   4% Patch 1 Patch Transdermal daily  metoprolol succinate ER 25 milliGRAM(s) Oral daily  mupirocin 2% Ointment 1 Application(s) Topical two times a day  sodium chloride 0.9% lock flush 3 milliLiter(s) IV Push every 8 hours  thiamine 100 milliGRAM(s) Oral daily       PAST MEDICAL & SURGICAL HISTORY:  HTN (hypertension)    Complete heart block    CAD (coronary artery disease)    Paroxysmal atrial fibrillation    History of mechanical aortic valve replacement    History of pacemaker    S/P CABG (coronary artery bypass graft)             Subjective: "Hello"  Pt sleeping in bed, "better"    Tele:   Afib 70s                             T(C): 36.8 (10-09-21 @ 04:31), Max: 37 (10-08-21 @ 14:58)  HR: 75 (10-09-21 @ 04:31) (68 - 75)  BP: 112/73 (10-09-21 @ 04:31) (100/62 - 127/77)  RR: 18 (10-09-21 @ 04:31) (18 - 18)  SpO2: 98% (10-09-21 @ 04:31) (98% - 100%)        10-09    138  |  104  |  19  ----------------------------<  93  4.3   |  21<L>  |  0.88    Ca    8.8      09 Oct 2021 05:41    TPro  6.7  /  Alb  3.5  /  TBili  0.7  /  DBili  x   /  AST  25  /  ALT  11  /  AlkPhos  148<H>  10-09                               8.5    6.77  )-----------( 176      ( 09 Oct 2021 05:41 )             28.4        PTT - ( 09 Oct 2021 05:41 )  PTT:85.8 sec      Assessment    Neurology: A&Ox3, NAD    CV : RRR+S1S2    Lungs: Respirations non-labored, B/L BS CTA    Abdomen: Soft, NT/ND, +BSx4Q    : Voiding without difficulty    Extremities: B/L LE no edema, negative calf tenderness, +PP      Int multiple raised redddened rash  across chest , electrode lead sites> changed to pedi pads      MEDICATIONS  (STANDING):  amLODIPine   Tablet 2.5 milliGRAM(s) Oral daily  atorvastatin 40 milliGRAM(s) Oral at bedtime  ferrous    sulfate 325 milliGRAM(s) Oral daily  folic acid 1 milliGRAM(s) Oral daily  furosemide    Tablet 40 milliGRAM(s) Oral daily  heparin  Infusion 750 Unit(s)/Hr (9.5 mL/Hr) IV Continuous <Continuous>  lidocaine   4% Patch 1 Patch Transdermal daily  metoprolol succinate ER 25 milliGRAM(s) Oral daily  mupirocin 2% Ointment 1 Application(s) Topical two times a day  sodium chloride 0.9% lock flush 3 milliLiter(s) IV Push every 8 hours  thiamine 100 milliGRAM(s) Oral daily       PAST MEDICAL & SURGICAL HISTORY:  HTN (hypertension)    Complete heart block    CAD (coronary artery disease)    Paroxysmal atrial fibrillation    History of mechanical aortic valve replacement    History of pacemaker    S/P CABG (coronary artery bypass graft)

## 2021-10-09 NOTE — PROGRESS NOTE ADULT - ASSESSMENT
Patient is a 70M PMH HTN, CAD s/p CABG 10 years ago per patient , Complete Heart Block s/p PPM placement, Paroxysmal Afib on coumadin, Aortic stenosis s/p Mechanical AVR who admitted to OSH with acute heart failure exacerbation found on echo to have low normal EF 50-55%, severely dilated RA/RV/pulm HTN reduced RV fx, severe MR and severe TR. Transferred to [facility] for open heart surgery with Dr. Hua. Pre-op workup initiated.  Dr. Hua to review BRITT films from outside hospital, patient came with disc.  Patient will need CT Chest Non-Con and cardiac cath as part of pre-operative evaluation.  Currently patient is hemodynamically stable, rate controlled afib on tele and is chest pain/shortness of breath free on room air oxygen.  Coumadin held at this time and is being anticoagulated with heparin gtt given hx of afib/ mechanical valve and need for cardiac cath.      10/6 VSS, CT chest emphysema, s/p LHC normal coronaries.  10/7 VSS, f/u echo and carotids today, EP called for PPM battery change.  10/8 Cardiac CT  Echo completed   R shoulder stiffness> xray ordered, lidoderm patch  10/9 Shoulder xray unremarkable Improved ROM, less pain  Continue preop w/u  MVR/TVR plan Monday

## 2021-10-09 NOTE — PROGRESS NOTE ADULT - PROBLEM SELECTOR PLAN 4
s/p CABG 10 years ago per patient at [facility]   Continue Toprol XL 25 daily  Continue Atorvastatin 40 HS  10/6 [facility] normal coronaries

## 2021-10-10 ENCOUNTER — TRANSCRIPTION ENCOUNTER (OUTPATIENT)
Age: 71
End: 2021-10-10

## 2021-10-10 LAB
ALBUMIN SERPL ELPH-MCNC: 3.5 G/DL — SIGNIFICANT CHANGE UP (ref 3.3–5)
ALP SERPL-CCNC: 169 U/L — HIGH (ref 40–120)
ALT FLD-CCNC: 12 U/L — SIGNIFICANT CHANGE UP (ref 10–45)
ANION GAP SERPL CALC-SCNC: 14 MMOL/L — SIGNIFICANT CHANGE UP (ref 5–17)
APTT BLD: 88.9 SEC — HIGH (ref 27.5–35.5)
AST SERPL-CCNC: 22 U/L — SIGNIFICANT CHANGE UP (ref 10–40)
BILIRUB SERPL-MCNC: 0.7 MG/DL — SIGNIFICANT CHANGE UP (ref 0.2–1.2)
BLD GP AB SCN SERPL QL: NEGATIVE — SIGNIFICANT CHANGE UP
BUN SERPL-MCNC: 23 MG/DL — SIGNIFICANT CHANGE UP (ref 7–23)
CALCIUM SERPL-MCNC: 9.1 MG/DL — SIGNIFICANT CHANGE UP (ref 8.4–10.5)
CHLORIDE SERPL-SCNC: 102 MMOL/L — SIGNIFICANT CHANGE UP (ref 96–108)
CO2 SERPL-SCNC: 21 MMOL/L — LOW (ref 22–31)
CREAT SERPL-MCNC: 0.92 MG/DL — SIGNIFICANT CHANGE UP (ref 0.5–1.3)
GLUCOSE SERPL-MCNC: 102 MG/DL — HIGH (ref 70–99)
HCT VFR BLD CALC: 27.5 % — LOW (ref 39–50)
HGB BLD-MCNC: 8.1 G/DL — LOW (ref 13–17)
MCHC RBC-ENTMCNC: 21.1 PG — LOW (ref 27–34)
MCHC RBC-ENTMCNC: 29.5 GM/DL — LOW (ref 32–36)
MCV RBC AUTO: 71.6 FL — LOW (ref 80–100)
NRBC # BLD: 0 /100 WBCS — SIGNIFICANT CHANGE UP (ref 0–0)
PLATELET # BLD AUTO: 171 K/UL — SIGNIFICANT CHANGE UP (ref 150–400)
POTASSIUM SERPL-MCNC: 3.8 MMOL/L — SIGNIFICANT CHANGE UP (ref 3.5–5.3)
POTASSIUM SERPL-SCNC: 3.8 MMOL/L — SIGNIFICANT CHANGE UP (ref 3.5–5.3)
PROT SERPL-MCNC: 6.6 G/DL — SIGNIFICANT CHANGE UP (ref 6–8.3)
RBC # BLD: 3.84 M/UL — LOW (ref 4.2–5.8)
RBC # FLD: 21.2 % — HIGH (ref 10.3–14.5)
RH IG SCN BLD-IMP: POSITIVE — SIGNIFICANT CHANGE UP
SARS-COV-2 RNA SPEC QL NAA+PROBE: SIGNIFICANT CHANGE UP
SODIUM SERPL-SCNC: 137 MMOL/L — SIGNIFICANT CHANGE UP (ref 135–145)
WBC # BLD: 9.3 K/UL — SIGNIFICANT CHANGE UP (ref 3.8–10.5)
WBC # FLD AUTO: 9.3 K/UL — SIGNIFICANT CHANGE UP (ref 3.8–10.5)

## 2021-10-10 PROCEDURE — ZZZZZ: CPT

## 2021-10-10 RX ORDER — LEVOTHYROXINE SODIUM 125 MCG
25 TABLET ORAL DAILY
Refills: 0 | Status: DISCONTINUED | OUTPATIENT
Start: 2021-10-10 | End: 2021-10-11

## 2021-10-10 RX ORDER — ACETAMINOPHEN 500 MG
1000 TABLET ORAL ONCE
Refills: 0 | Status: COMPLETED | OUTPATIENT
Start: 2021-10-10 | End: 2021-10-11

## 2021-10-10 RX ORDER — POTASSIUM CHLORIDE 20 MEQ
40 PACKET (EA) ORAL ONCE
Refills: 0 | Status: COMPLETED | OUTPATIENT
Start: 2021-10-10 | End: 2021-10-10

## 2021-10-10 RX ORDER — KETOROLAC TROMETHAMINE 30 MG/ML
15 SYRINGE (ML) INJECTION ONCE
Refills: 0 | Status: DISCONTINUED | OUTPATIENT
Start: 2021-10-10 | End: 2021-10-10

## 2021-10-10 RX ORDER — CEFUROXIME AXETIL 250 MG
1500 TABLET ORAL ONCE
Refills: 0 | Status: DISCONTINUED | OUTPATIENT
Start: 2021-10-10 | End: 2021-10-11

## 2021-10-10 RX ORDER — CHLORHEXIDINE GLUCONATE 213 G/1000ML
1 SOLUTION TOPICAL ONCE
Refills: 0 | Status: COMPLETED | OUTPATIENT
Start: 2021-10-10 | End: 2021-10-10

## 2021-10-10 RX ORDER — GABAPENTIN 400 MG/1
300 CAPSULE ORAL ONCE
Refills: 0 | Status: COMPLETED | OUTPATIENT
Start: 2021-10-10 | End: 2021-10-11

## 2021-10-10 RX ORDER — CHLORHEXIDINE GLUCONATE 213 G/1000ML
15 SOLUTION TOPICAL
Refills: 0 | Status: DISCONTINUED | OUTPATIENT
Start: 2021-10-10 | End: 2021-10-11

## 2021-10-10 RX ORDER — ASCORBIC ACID 60 MG
2000 TABLET,CHEWABLE ORAL ONCE
Refills: 0 | Status: COMPLETED | OUTPATIENT
Start: 2021-10-10 | End: 2021-10-11

## 2021-10-10 RX ADMIN — LIDOCAINE 1 PATCH: 4 CREAM TOPICAL at 11:05

## 2021-10-10 RX ADMIN — SODIUM CHLORIDE 3 MILLILITER(S): 9 INJECTION INTRAMUSCULAR; INTRAVENOUS; SUBCUTANEOUS at 23:32

## 2021-10-10 RX ADMIN — AMLODIPINE BESYLATE 2.5 MILLIGRAM(S): 2.5 TABLET ORAL at 05:51

## 2021-10-10 RX ADMIN — HEPARIN SODIUM 9.5 UNIT(S)/HR: 5000 INJECTION INTRAVENOUS; SUBCUTANEOUS at 11:05

## 2021-10-10 RX ADMIN — MUPIROCIN 1 APPLICATION(S): 20 OINTMENT TOPICAL at 18:14

## 2021-10-10 RX ADMIN — SODIUM CHLORIDE 3 MILLILITER(S): 9 INJECTION INTRAMUSCULAR; INTRAVENOUS; SUBCUTANEOUS at 13:00

## 2021-10-10 RX ADMIN — LIDOCAINE 1 PATCH: 4 CREAM TOPICAL at 23:32

## 2021-10-10 RX ADMIN — Medication 15 MILLIGRAM(S): at 18:30

## 2021-10-10 RX ADMIN — Medication 40 MILLIGRAM(S): at 05:51

## 2021-10-10 RX ADMIN — Medication 40 MILLIEQUIVALENT(S): at 08:01

## 2021-10-10 RX ADMIN — Medication 1 APPLICATION(S): at 17:22

## 2021-10-10 RX ADMIN — Medication 25 MILLIGRAM(S): at 05:51

## 2021-10-10 RX ADMIN — CHLORHEXIDINE GLUCONATE 1 APPLICATION(S): 213 SOLUTION TOPICAL at 20:42

## 2021-10-10 RX ADMIN — Medication 15 MILLIGRAM(S): at 18:15

## 2021-10-10 RX ADMIN — ATORVASTATIN CALCIUM 40 MILLIGRAM(S): 80 TABLET, FILM COATED ORAL at 22:40

## 2021-10-10 RX ADMIN — LIDOCAINE 1 PATCH: 4 CREAM TOPICAL at 18:24

## 2021-10-10 RX ADMIN — CHLORHEXIDINE GLUCONATE 15 MILLILITER(S): 213 SOLUTION TOPICAL at 18:14

## 2021-10-10 RX ADMIN — Medication 100 MILLIGRAM(S): at 11:05

## 2021-10-10 RX ADMIN — MUPIROCIN 1 APPLICATION(S): 20 OINTMENT TOPICAL at 05:51

## 2021-10-10 RX ADMIN — Medication 1 MILLIGRAM(S): at 11:04

## 2021-10-10 RX ADMIN — SODIUM CHLORIDE 3 MILLILITER(S): 9 INJECTION INTRAMUSCULAR; INTRAVENOUS; SUBCUTANEOUS at 05:52

## 2021-10-10 RX ADMIN — Medication 325 MILLIGRAM(S): at 11:05

## 2021-10-10 RX ADMIN — Medication 25 MICROGRAM(S): at 08:00

## 2021-10-10 NOTE — CONSULT NOTE ADULT - SUBJECTIVE AND OBJECTIVE BOX
DERMATOLOGY HPI  70M with significant cardiac hx (PMH HTN, CAD s/p CABG 10 years ago per patient , Complete Heart Block s/p PPM placement, Paroxysmal Afib on coumadin, Aortic stenosis s/p Mechanical AVR) transferred from [facility] to [facility] 5 days ago for open heart surgery with Dr. Hua (plan for MVR, TVR on Monday 10/11). Dermatology consulted for rash on chest localized underneath EKG leads. Team denies presence of rash elsewhere or hx of new medications.       PAST MEDICAL & SURGICAL HISTORY:  HTN (hypertension)    Complete heart block    CAD (coronary artery disease)    Paroxysmal atrial fibrillation    History of mechanical aortic valve replacement    History of pacemaker    S/P CABG (coronary artery bypass graft)      MEDICATIONS  (STANDING):  acetaminophen   Tablet .. 1000 milliGRAM(s) Oral once  amLODIPine   Tablet 2.5 milliGRAM(s) Oral daily  ascorbic acid 2000 milliGRAM(s) Oral once  atorvastatin 40 milliGRAM(s) Oral at bedtime  cefuroxime  IVPB 1500 milliGRAM(s) IV Intermittent once  chlorhexidine 0.12% Liquid 15 milliLiter(s) Swish and Spit two times a day  chlorhexidine 4% Liquid 1 Application(s) Topical once  ferrous    sulfate 325 milliGRAM(s) Oral daily  folic acid 1 milliGRAM(s) Oral daily  furosemide    Tablet 40 milliGRAM(s) Oral daily  gabapentin 300 milliGRAM(s) Oral once  heparin  Infusion 750 Unit(s)/Hr (9.5 mL/Hr) IV Continuous <Continuous>  levothyroxine 25 MICROGram(s) Oral daily  lidocaine   4% Patch 1 Patch Transdermal daily  metoprolol succinate ER 25 milliGRAM(s) Oral daily  mupirocin 2% Ointment 1 Application(s) Topical two times a day  sodium chloride 0.9% lock flush 3 milliLiter(s) IV Push every 8 hours  thiamine 100 milliGRAM(s) Oral daily  triamcinolone 0.1% Cream 1 Application(s) Topical two times a day    Allergies    penicillin (Rash; Pruritus; Hives)  tell electrodes (Rash)    Intolerances    Social History per EMR:  Patient is a retired   Lives with Sister  Denies alcohol use  Smoked 3 cigarettes a day for 55 years quit 4 months ago  Drank beer for 30 years, several beers over the weekend.  Quit 1 year ago (05 Oct 2021 23:42)      FAMILY HISTORY per EMR:  not contributory      Vital Signs Last 24 Hrs  T(C): 37 (10 Oct 2021 04:48), Max: 37 (09 Oct 2021 15:09)  T(F): 98.6 (10 Oct 2021 04:48), Max: 98.6 (09 Oct 2021 15:09)  HR: 66 (10 Oct 2021 04:48) (66 - 72)  BP: 100/59 (10 Oct 2021 04:48) (100/59 - 114/57)  BP(mean): --  RR: 18 (10 Oct 2021 04:48) (16 - 18)  SpO2: 97% (10 Oct 2021 04:48) (97% - 100%)      Exam per securely sent photos:  Well demarcated thin erythematous plaques with signs of excoriation on the upper chest    LABS:                        8.1    9.30  )-----------( 171      ( 10 Oct 2021 05:28 )             27.5     10-10    137  |  102  |  23  ----------------------------<  102<H>  3.8   |  21<L>  |  0.92    Ca    9.1      10 Oct 2021 05:28    TPro  6.6  /  Alb  3.5  /  TBili  0.7  /  DBili  x   /  AST  22  /  ALT  12  /  AlkPhos  169<H>  10-10    IMAGING         DERMATOLOGY HPI  70M with significant cardiac hx (PMH HTN, CAD s/p CABG 10 years ago per patient, complete Heart Block s/p PPM placement, Paroxysmal Afib on coumadin, Aortic stenosis s/p Mechanical AVR) transferred from [facility] to [facility] 5 days ago for open heart surgery with Dr. Hua (plan for MVR, TVR on Monday 10/11). Dermatology consulted for rash on chest localized underneath EKG leads. History obtained from primary team and EMR review. Team denies presence of rash elsewhere or hx of new medications.       PAST MEDICAL & SURGICAL HISTORY:  HTN (hypertension)  Complete heart block  CAD (coronary artery disease)  Paroxysmal atrial fibrillation  History of mechanical aortic valve replacement  History of pacemaker  S/P CABG (coronary artery bypass graft)      MEDICATIONS  (STANDING):  acetaminophen   Tablet .. 1000 milliGRAM(s) Oral once  amLODIPine   Tablet 2.5 milliGRAM(s) Oral daily  ascorbic acid 2000 milliGRAM(s) Oral once  atorvastatin 40 milliGRAM(s) Oral at bedtime  cefuroxime  IVPB 1500 milliGRAM(s) IV Intermittent once  chlorhexidine 0.12% Liquid 15 milliLiter(s) Swish and Spit two times a day  chlorhexidine 4% Liquid 1 Application(s) Topical once  ferrous    sulfate 325 milliGRAM(s) Oral daily  folic acid 1 milliGRAM(s) Oral daily  furosemide    Tablet 40 milliGRAM(s) Oral daily  gabapentin 300 milliGRAM(s) Oral once  heparin  Infusion 750 Unit(s)/Hr (9.5 mL/Hr) IV Continuous <Continuous>  levothyroxine 25 MICROGram(s) Oral daily  lidocaine   4% Patch 1 Patch Transdermal daily  metoprolol succinate ER 25 milliGRAM(s) Oral daily  mupirocin 2% Ointment 1 Application(s) Topical two times a day  sodium chloride 0.9% lock flush 3 milliLiter(s) IV Push every 8 hours  thiamine 100 milliGRAM(s) Oral daily  triamcinolone 0.1% Cream 1 Application(s) Topical two times a day    Allergies  penicillin (Rash; Pruritus; Hives)  tell electrodes (Rash)      Social History per EMR:  Patient is a retired   Lives with Sister  Denies alcohol use  Smoked 3 cigarettes a day for 55 years quit 4 months ago  Drank beer for 30 years, several beers over the weekend.  Quit 1 year ago (05 Oct 2021 23:42)    VITALS    Vital Signs Last 24 Hrs  T(C): 37 (10 Oct 2021 04:48), Max: 37 (09 Oct 2021 15:09)  T(F): 98.6 (10 Oct 2021 04:48), Max: 98.6 (09 Oct 2021 15:09)  HR: 66 (10 Oct 2021 04:48) (66 - 72)  BP: 100/59 (10 Oct 2021 04:48) (100/59 - 114/57)  BP(mean): --  RR: 18 (10 Oct 2021 04:48) (16 - 18)  SpO2: 97% (10 Oct 2021 04:48) (97% - 100%)      PHYSICAL EXAM  Exam per securely sent photos:  Well demarcated thin erythematous plaques with signs of excoriation on the upper chest    LABS:                        8.1    9.30  )-----------( 171      ( 10 Oct 2021 05:28 )             27.5     10-10    137  |  102  |  23  ----------------------------<  102<H>  3.8   |  21<L>  |  0.92    Ca    9.1      10 Oct 2021 05:28    TPro  6.6  /  Alb  3.5  /  TBili  0.7  /  DBili  x   /  AST  22  /  ALT  12  /  AlkPhos  169<H>  10-10    IMAGING    CT chest 10/6/2021  IMPRESSION :Mild tracheal secretions. Emphysema.    [facility] 10/6:  Diagnostic Findings:   Coronary Angiography   The coronary circulation is right dominant.    Left main artery: Angiography shows no disease.    Left anterior descending artery: Angiography shows no disease.    Carotid duplex US 10/7/2021: IMPRESSION: No significant hemodynamic stenosis of either carotid artery.    CT heart with IV contras 10/8/2021:  NON-CARDIAC:  Mild centrilobular emphysema. Calcified granuloma within left lower lobe.  Mild perihepatic ascites.  Small fat-containing umbilical hernia.     CARDIOVASCULAR:  Cardiomegaly. Severe dilatation of the atria. Pacer in place. Status post replacement of the aortic valve and ascending aorta with expected postsurgical changes. This study was not optimized for coronary artery evaluation. Coronary artery calcifications are noted in the coronary arteries. There is no pericardial effusion. There is no left atrial appendage thrombus. No thoracic aortic dissection or aneurysm is noted.     Aortic valve: Status post aortic valve replacement.  Aortic root: No calcification.  Ascending aorta: Status post replacement of the ascending aorta.  Aortic arch: Minimal calcification.  Descending thoracic aorta: Minimal calcification.  Abdominal aorta: Minimal calcification.     Peripheral access measurements:     Left subclavian artery:        Minimum lumen diameter (MLD) (mm): 4.9        Diameter perpendicular to MLD (mm): 5.5     Abdominal Aorta:        Minimum lumen diameter (MLD) (mm): 12.9        Diameter perpendicular to MLD (mm): 15.5     Peripheral Vascular Measurements:     Right Common Iliac:        Minimum Lumen Diameter (mm): 8.7        Diameter perpendicular to MLD (mm): 9.7        Tortuosity: Mild        Calcification: None     Left Common Iliac:         Minimum Lumen Diameter (mm): 9.5        Diameter perpendicular to MLD (mm): 11.3        Tortuosity: Mild        Calcification: Mild     Right External Iliac:        Minimum Lumen Diameter (mm): 7        Diameter perpendicular to MLD (mm): 8.7        Tortuosity: Mild        Calcification: None     Left External Iliac:        Minimum Lumen Diameter (mm): 6.2        Diameter perpendicular to MLD (mm): 7.8        Tortuosity: Mild        Calcification: None     Right Femoral:        Minimum Lumen Diameter (mm): 7.7        Diameter perpendicular to MLD (mm): 9.3        Tortuosity: Mild        Calcification: None  Left Femoral:        Minimum Lumen Diameter (mm): 7        Diameter perpendicular to MLD (mm): 8.1        Tortuosity: Mild        Calcification: None     IMPRESSION:  Peripheral access vessel measurements as reported above.   Status post aortic valve and ascending aorta replacement.       R shoulder X Ray 10/8/2021  IMPRESSION:  No dislocations acute appearing fractures or AC separation.  Redemonstrated chronic small ossicle at superior AC joint margin. Developed degenerative spurring along the distal clavicular superior articular margin. Otherwise relatively preserved appearing AC joint space. Narrowed appearing glenohumeral joint space.  Maintained subacromial and coracoclavicular spaces.  Generalized osteopenia otherwise no discrete lytic or blastic lesions.  Sternal wires and cardiac pacing wire leads again noted in partially visualized chest.     Echocardiogram 10/8/21:   Conclusions:  Normal left ventricular systolic function. No segmental  wall motion abnormalities.  Normal appearing mitral leafets. Eccentric mitral  regurgitaiton directed posterolaterally, probably  "moderate".  Mechanical aortic valve without stenosis. Aortic  regurgitaiton, probably no more than "mild-moderate", is  present.  Right ventricular enlargement with decreased right  ventricular systolic function.  Severe tricuspid regurgitation. Pulmonary artery pressure  cannot be estimated; tricuspid regurgitation is too severe.   A device wire is noted in the right heart.              DERMATOLOGY HPI  71M with significant cardiac hx (PMH HTN, CAD s/p CABG 10 years ago per patient, complete Heart Block s/p PPM placement, Paroxysmal Afib on coumadin, Aortic stenosis s/p Mechanical AVR) transferred from [facility] to [facility] 5 days ago for open heart surgery with Dr. Hua (plan for MVR, TVR on Monday 10/11). Dermatology consulted for rash on chest localized underneath EKG leads. History obtained from primary team and EMR review. Team denies presence of rash elsewhere or hx of new medications.       PAST MEDICAL & SURGICAL HISTORY:  HTN (hypertension)  Complete heart block  CAD (coronary artery disease)  Paroxysmal atrial fibrillation  History of mechanical aortic valve replacement  History of pacemaker  S/P CABG (coronary artery bypass graft)      MEDICATIONS  (STANDING):  acetaminophen   Tablet .. 1000 milliGRAM(s) Oral once  amLODIPine   Tablet 2.5 milliGRAM(s) Oral daily  ascorbic acid 2000 milliGRAM(s) Oral once  atorvastatin 40 milliGRAM(s) Oral at bedtime  cefuroxime  IVPB 1500 milliGRAM(s) IV Intermittent once  chlorhexidine 0.12% Liquid 15 milliLiter(s) Swish and Spit two times a day  chlorhexidine 4% Liquid 1 Application(s) Topical once  ferrous    sulfate 325 milliGRAM(s) Oral daily  folic acid 1 milliGRAM(s) Oral daily  furosemide    Tablet 40 milliGRAM(s) Oral daily  gabapentin 300 milliGRAM(s) Oral once  heparin  Infusion 750 Unit(s)/Hr (9.5 mL/Hr) IV Continuous <Continuous>  levothyroxine 25 MICROGram(s) Oral daily  lidocaine   4% Patch 1 Patch Transdermal daily  metoprolol succinate ER 25 milliGRAM(s) Oral daily  mupirocin 2% Ointment 1 Application(s) Topical two times a day  sodium chloride 0.9% lock flush 3 milliLiter(s) IV Push every 8 hours  thiamine 100 milliGRAM(s) Oral daily  triamcinolone 0.1% Cream 1 Application(s) Topical two times a day    Allergies  penicillin (Rash; Pruritus; Hives)  tell electrodes (Rash)      Social History per EMR:  Patient is a retired   Lives with Sister  Denies alcohol use  Smoked 3 cigarettes a day for 55 years quit 4 months ago  Drank beer for 30 years, several beers over the weekend.  Quit 1 year ago (05 Oct 2021 23:42)    VITALS    Vital Signs Last 24 Hrs  T(C): 37 (10 Oct 2021 04:48), Max: 37 (09 Oct 2021 15:09)  T(F): 98.6 (10 Oct 2021 04:48), Max: 98.6 (09 Oct 2021 15:09)  HR: 66 (10 Oct 2021 04:48) (66 - 72)  BP: 100/59 (10 Oct 2021 04:48) (100/59 - 114/57)  BP(mean): --  RR: 18 (10 Oct 2021 04:48) (16 - 18)  SpO2: 97% (10 Oct 2021 04:48) (97% - 100%)      PHYSICAL EXAM  Exam per securely sent photos:  Well demarcated thin erythematous plaques with signs of excoriation on the upper chest    LABS:                        8.1    9.30  )-----------( 171      ( 10 Oct 2021 05:28 )             27.5     10-10    137  |  102  |  23  ----------------------------<  102<H>  3.8   |  21<L>  |  0.92    Ca    9.1      10 Oct 2021 05:28    TPro  6.6  /  Alb  3.5  /  TBili  0.7  /  DBili  x   /  AST  22  /  ALT  12  /  AlkPhos  169<H>  10-10    IMAGING    CT chest 10/6/2021  IMPRESSION :Mild tracheal secretions. Emphysema.    [facility] 10/6:  Diagnostic Findings:   Coronary Angiography   The coronary circulation is right dominant.    Left main artery: Angiography shows no disease.    Left anterior descending artery: Angiography shows no disease.    Carotid duplex US 10/7/2021: IMPRESSION: No significant hemodynamic stenosis of either carotid artery.    CT heart with IV contras 10/8/2021:  NON-CARDIAC:  Mild centrilobular emphysema. Calcified granuloma within left lower lobe.  Mild perihepatic ascites.  Small fat-containing umbilical hernia.     CARDIOVASCULAR:  Cardiomegaly. Severe dilatation of the atria. Pacer in place. Status post replacement of the aortic valve and ascending aorta with expected postsurgical changes. This study was not optimized for coronary artery evaluation. Coronary artery calcifications are noted in the coronary arteries. There is no pericardial effusion. There is no left atrial appendage thrombus. No thoracic aortic dissection or aneurysm is noted.     Aortic valve: Status post aortic valve replacement.  Aortic root: No calcification.  Ascending aorta: Status post replacement of the ascending aorta.  Aortic arch: Minimal calcification.  Descending thoracic aorta: Minimal calcification.  Abdominal aorta: Minimal calcification.     Peripheral access measurements:     Left subclavian artery:        Minimum lumen diameter (MLD) (mm): 4.9        Diameter perpendicular to MLD (mm): 5.5     Abdominal Aorta:        Minimum lumen diameter (MLD) (mm): 12.9        Diameter perpendicular to MLD (mm): 15.5     Peripheral Vascular Measurements:     Right Common Iliac:        Minimum Lumen Diameter (mm): 8.7        Diameter perpendicular to MLD (mm): 9.7        Tortuosity: Mild        Calcification: None     Left Common Iliac:         Minimum Lumen Diameter (mm): 9.5        Diameter perpendicular to MLD (mm): 11.3        Tortuosity: Mild        Calcification: Mild     Right External Iliac:        Minimum Lumen Diameter (mm): 7        Diameter perpendicular to MLD (mm): 8.7        Tortuosity: Mild        Calcification: None     Left External Iliac:        Minimum Lumen Diameter (mm): 6.2        Diameter perpendicular to MLD (mm): 7.8        Tortuosity: Mild        Calcification: None     Right Femoral:        Minimum Lumen Diameter (mm): 7.7        Diameter perpendicular to MLD (mm): 9.3        Tortuosity: Mild        Calcification: None  Left Femoral:        Minimum Lumen Diameter (mm): 7        Diameter perpendicular to MLD (mm): 8.1        Tortuosity: Mild        Calcification: None     IMPRESSION:  Peripheral access vessel measurements as reported above.   Status post aortic valve and ascending aorta replacement.       R shoulder X Ray 10/8/2021  IMPRESSION:  No dislocations acute appearing fractures or AC separation.  Redemonstrated chronic small ossicle at superior AC joint margin. Developed degenerative spurring along the distal clavicular superior articular margin. Otherwise relatively preserved appearing AC joint space. Narrowed appearing glenohumeral joint space.  Maintained subacromial and coracoclavicular spaces.  Generalized osteopenia otherwise no discrete lytic or blastic lesions.  Sternal wires and cardiac pacing wire leads again noted in partially visualized chest.     Echocardiogram 10/8/21:   Conclusions:  Normal left ventricular systolic function. No segmental  wall motion abnormalities.  Normal appearing mitral leafets. Eccentric mitral  regurgitaiton directed posterolaterally, probably  "moderate".  Mechanical aortic valve without stenosis. Aortic  regurgitaiton, probably no more than "mild-moderate", is  present.  Right ventricular enlargement with decreased right  ventricular systolic function.  Severe tricuspid regurgitation. Pulmonary artery pressure  cannot be estimated; tricuspid regurgitation is too severe.   A device wire is noted in the right heart.

## 2021-10-10 NOTE — CONSULT NOTE ADULT - ASSESSMENT
62 y/o male with significant cardiac disease, admitted for MR/TR valve replacement, now with erythematous plaques at EKG lead sites. Favor irritant contact dermatitis to EKG leads.    At this time, recommend:  -triamcinolone 0.1% ointment BID to affected areas for 1 week  -can apply vaseline to affected areas after bathing  -Frequent changing of electrodes if possible  -If alternative hypoallergenic or silicone based leads are available would recommend using these instead      The patient's chart was reviewed in addition to photos of the rash taken by the primary team with the permission of the patient.  Patient was discussed remotely with the dermatology attending Dr. Karrie Moulton.  Recommendations were communicated with the primary team.  Please page [phone] for further related questions.    hSawn Palmer MD  Resident Physician, PGY2  [facility]  Pager: [phone]  Office: [phone]   72 y/o male with significant cardiac disease, admitted for MR/TR valve replacement, now with erythematous plaques at EKG lead sites. Favor irritant contact dermatitis to EKG leads.    At this time, recommend:  -triamcinolone 0.1% ointment BID to affected areas for 1 week  -can apply vaseline to affected areas after bathing  -Frequent changing of electrodes if possible  -If alternative hypoallergenic or silicone based leads are available would recommend using these instead      The patient's chart was reviewed in addition to photos of the rash taken by the primary team with the permission of the patient.  Patient was discussed remotely with the dermatology attending Dr. Karrie Moulton.  Recommendations were communicated with the primary team.  Please page [phone] for further related questions.    Shawn Palmer MD  Resident Physician, PGY2  [facility]at[facility]  Pager: [phone]  Office: [phone]   70 y/o male with significant cardiac disease, admitted for MR/TR valve replacement, now with erythematous plaques at EKG lead sites.     Favor contact dermatitis secondary to adhesive in EKG leads.  At this time, recommend:  -Triamcinolone 0.1% ointment BID to affected areas for 1 week  -May apply vaseline to affected areas after bathing  -Frequent changing of electrodes if possible  -If alternative hypoallergenic or silicone based leads are available, would recommend using these instead      The patient's chart was reviewed in addition to photos of the rash taken by the primary team with the permission of the patient.  Patient was discussed remotely with the dermatology attending Dr. Karrie Moulton.  Recommendations were communicated with the primary team.  Please page [phone] for further related questions.    Shawn Palmer MD  Resident Physician, PGY2  [facility]at[facility]  Pager: [phone]  Office: [phone]

## 2021-10-10 NOTE — PROGRESS NOTE ADULT - SUBJECTIVE AND OBJECTIVE BOX
Cardiac Surgery Pre-op Note:    CC: Patient is a 71y old  Male who presents with a chief complaint of SOB/Open Heart Surg eval (09 Oct 2021 11:31)                                                                                                             Surgeon: Javid    Procedure: Reop MVR TVR    Allergies    penicillin (Rash; Pruritus; Hives)  tell electrodes (Rash)    Intolerances        HPI:  Patient is a 70M PMH HTN, CAD s/p CABG in 2009, Complete Heart Block s/p PPM placement, Paroxysmal Afib on coumadin, Aortic stenosis s/p Mechanical AVR who admitted to OSH with acute heart failure exacerbation/sob/LAU.  Patient states he has to stop and catch his breath when walking up stairs. On echo to have low normal EF 50-55%, Mild reduced RV fx, mod-severe MR and severe TR. Transferred to [facility] for open heart surgery with Dr. Hua. Patient endorses shortness of breath and LAU, denies chest pain, abdominal pain, fevers, chills, nausea.  (05 Oct 2021 23:42)      PAST MEDICAL & SURGICAL HISTORY:  HTN (hypertension)    Complete heart block    CAD (coronary artery disease)    Paroxysmal atrial fibrillation    History of mechanical aortic valve replacement    History of pacemaker    S/P CABG (coronary artery bypass graft)        MEDICATIONS  (STANDING):  amLODIPine   Tablet 2.5 milliGRAM(s) Oral daily  atorvastatin 40 milliGRAM(s) Oral at bedtime  ferrous    sulfate 325 milliGRAM(s) Oral daily  folic acid 1 milliGRAM(s) Oral daily  furosemide    Tablet 40 milliGRAM(s) Oral daily  heparin  Infusion 750 Unit(s)/Hr (9.5 mL/Hr) IV Continuous <Continuous>  levothyroxine 25 MICROGram(s) Oral daily  lidocaine   4% Patch 1 Patch Transdermal daily  metoprolol succinate ER 25 milliGRAM(s) Oral daily  mupirocin 2% Ointment 1 Application(s) Topical two times a day  sodium chloride 0.9% lock flush 3 milliLiter(s) IV Push every 8 hours  thiamine 100 milliGRAM(s) Oral daily    MEDICATIONS  (PRN):        Labs:                        8.1    9.30  )-----------( 171      ( 10 Oct 2021 05:28 )             27.5     10-10    137  |  102  |  23  ----------------------------<  102<H>  3.8   |  21<L>  |  0.92    Ca    9.1      10 Oct 2021 05:28    TPro  6.6  /  Alb  3.5  /  TBili  0.7  /  DBili  x   /  AST  22  /  ALT  12  /  AlkPhos  169<H>  10-10    PTT - ( 10 Oct 2021 05:28 )  PTT:88.9 sec    Blood Type: ABO Interpretation: B (10-10 @ 09:41)  Type + Screen (10.10.21 @ 09:41)    ABO Interpretation: B    Rh Interpretation: Positive    Antibody Screen: Negative      HGB A1C:   Prealbumin:   Pro-BNP: Serum Pro-Brain Natriuretic Peptide: 689 pg/mL (10-06 @ 00:39)    Thyroid Panel: 10-07 @ 18:00/--  --/7.8/104  10-06 @ 05:04/8.37  --/--/--    MRSA: MRSA PCR Result.: NotDetec (10-06 @ 04:20)   / MSSA:       CXR:   < from: Xray Chest 1 View AP/PA (10.05.21 @ 23:48) >  Support devices: A pacemaker overlies the left chest wall with its leads intact.    Cardiac/mediastinum/hilum: Median sternotomy wires. The heart is massively enlarged.    Lung parenchyma/Pleura: The lungs are clear. There is no pleural effusion. There is no pneumothorax.    Skeleton/soft tissues: No acute osseous abnormalities.    < end of copied text >    EKG:  < from: 12 Lead ECG (10.06.21 @ 01:04) >  Diagnosis Line ATRIAL FIBRILLATION  RIGHT BUNDLE BRANCH BLOCK  ABNORMAL ECG    < end of copied text >    Carotid Duplex:    < from: [facility] Duplex Carotid, Bilat (10.07.21 @ 12:44) >    IMPRESSION: No significant hemodynamic stenosis of either carotid artery.    < end of copied text >    PFT's:    Echocardiogram:    Cardiac catheterization:  < from: Cardiac Catheterization (10.06.21 @ 13:55) >  LM   Left main artery: Angiography shows no disease.      LAD   Left anterior descending artery: Angiography shows no disease.        CX   Circumflex: Angiography shows no disease.      RCA   Right coronary artery: Angiography shows no disease.      < end of copied text >      Gen: WN/WD NAD  Neuro: AAOx3, nonfocal  Pulm: CTA B/L  CV: RRR, S1S2  Abd: Soft, NT, ND +BS  Ext: No edema, + peripheral pulses      Pt has AICD/PPM [ x] Yes  [ ] No             Brand Name: [employer]  Pre-op Beta Blocker ordered within 24 hrs of surgery?  [x ] Yes  [ ] No  If not, Why?  Type & Cross  [ x] Yes  [ ] No  NPO after Midnight [x ] Yes  [ ] No  Pre-op ABX ordered, to be taped on chart:  [x ] Yes  [ ] No     Hibiclens/Peridex ordered [x ] Yes  [ ] No  Intraop on Hold:  BRITT [x ]   Consent obtained  [x ] Yes  [ ] No   Cardiac Surgery Pre-op Note:    CC: Patient is a 71y old  Male who presents with a chief complaint of SOB/Open Heart Surg eval (09 Oct 2021 11:31)                                                                                                             Surgeon: Javid    Procedure: Reop MVR TVR    Allergies    penicillin (Rash; Pruritus; Hives)  tell electrodes (Rash)    Intolerances        HPI:  Patient is a 70M PMH HTN, CAD s/p CABG in 2009, Complete Heart Block s/p PPM placement, Paroxysmal Afib on coumadin, Aortic stenosis s/p Mechanical AVR who admitted to OSH with acute heart failure exacerbation/sob/LAU.  Patient states he has to stop and catch his breath when walking up stairs. On echo to have low normal EF 50-55%, Mild reduced RV fx, mod-severe MR and severe TR. Transferred to [facility] for open heart surgery with Dr. Hua. Patient endorses shortness of breath and LAU, denies chest pain, abdominal pain, fevers, chills, nausea.  (05 Oct 2021 23:42)      PAST MEDICAL & SURGICAL HISTORY:  HTN (hypertension)    Complete heart block    CAD (coronary artery disease)    Paroxysmal atrial fibrillation    History of mechanical aortic valve replacement    History of pacemaker    S/P CABG (coronary artery bypass graft)        MEDICATIONS  (STANDING):  amLODIPine   Tablet 2.5 milliGRAM(s) Oral daily  atorvastatin 40 milliGRAM(s) Oral at bedtime  ferrous    sulfate 325 milliGRAM(s) Oral daily  folic acid 1 milliGRAM(s) Oral daily  furosemide    Tablet 40 milliGRAM(s) Oral daily  heparin  Infusion 750 Unit(s)/Hr (9.5 mL/Hr) IV Continuous <Continuous>  levothyroxine 25 MICROGram(s) Oral daily  lidocaine   4% Patch 1 Patch Transdermal daily  metoprolol succinate ER 25 milliGRAM(s) Oral daily  mupirocin 2% Ointment 1 Application(s) Topical two times a day  sodium chloride 0.9% lock flush 3 milliLiter(s) IV Push every 8 hours  thiamine 100 milliGRAM(s) Oral daily    MEDICATIONS  (PRN):        Labs:                        8.1    9.30  )-----------( 171      ( 10 Oct 2021 05:28 )             27.5     10-10    137  |  102  |  23  ----------------------------<  102<H>  3.8   |  21<L>  |  0.92    Ca    9.1      10 Oct 2021 05:28    TPro  6.6  /  Alb  3.5  /  TBili  0.7  /  DBili  x   /  AST  22  /  ALT  12  /  AlkPhos  169<H>  10-10    PTT - ( 10 Oct 2021 05:28 )  PTT:88.9 sec    Blood Type: ABO Interpretation: B (10-10 @ 09:41)  Type + Screen (10.10.21 @ 09:41)    ABO Interpretation: B    Rh Interpretation: Positive    Antibody Screen: Negative  COVID COVID-19 PCR . (10.10.21 @ 09:16)    COVID-19 PCR: NotDetec: You can help in the fight against COVID-19. [facility] may contact    [facility] A1C: A1C with Estimated Average Glucose (10.06.21 @ 05:02)    A1C with Estimated Average Glucose Result: 5.7: Method: Immunoassay           Pro-BNP: Serum Pro-Brain Natriuretic Peptide: 689 pg/mL (10-06 @ 00:39)    Thyroid Panel: 10-07 @ 18:00/--  --/7.8/104  10-06 @ 05:04/8.37  --/--/--    MRSA: MRSA PCR Result.: Joseetec (10-06 @ 04:20)   / MSSA:       CXR:   < from: Xray Chest 1 View AP/PA (10.05.21 @ 23:48) >  Support devices: A pacemaker overlies the left chest wall with its leads intact.    Cardiac/mediastinum/hilum: Median sternotomy wires. The heart is massively enlarged.    Lung parenchyma/Pleura: The lungs are clear. There is no pleural effusion. There is no pneumothorax.    Skeleton/soft tissues: No acute osseous abnormalities.    < end of copied text >    EKG:  < from: 12 Lead ECG (10.06.21 @ 01:04) >  Diagnosis Line ATRIAL FIBRILLATION  RIGHT BUNDLE BRANCH BLOCK  ABNORMAL ECG    < end of copied text >    Carotid Duplex:    < from: [facility] Duplex Carotid, Bilat (10.07.21 @ 12:44) >    IMPRESSION: No significant hemodynamic stenosis of either carotid artery.    < end of copied text >    PFT's:    Echocardiogram:    Cardiac catheterization:  < from: Cardiac Catheterization (10.06.21 @ 13:55) >  LM   Left main artery: Angiography shows no disease.      LAD   Left anterior descending artery: Angiography shows no disease.        CX   Circumflex: Angiography shows no disease.      RCA   Right coronary artery: Angiography shows no disease.      < end of copied text >      Gen: WN/WD NAD  Neuro: AAOx3, nonfocal  Pulm: CTA B/L  CV: RRR, S1S2  Abd: Soft, NT, ND +BS  Ext: No edema, + peripheral pulses      Pt has AICD/PPM [ x] Yes  [ ] No             Brand Name: [employer]  Pre-op Beta Blocker ordered within 24 hrs of surgery?  [x ] Yes  [ ] No  If not, Why?  Type & Cross  [ x] Yes  [ ] No  NPO after Midnight [x ] Yes  [ ] No  Pre-op ABX ordered, to be taped on chart:  [x ] Yes  [ ] No     Hibiclens/Peridex ordered [x ] Yes  [ ] No  Intraop on Hold:  BRITT [x ]   Consent obtained  [x ] Yes  [ ] No

## 2021-10-10 NOTE — CONSULT NOTE ADULT - TIME BILLING
This was an interprofessional telehealth encounter in which the patient was located in the hospital and I was located at home/office. Photos and chart were reviewed remotely via HIPAA-secure platform. The results of this consultation were communicated to the primary team.    Favor contact dermatitis 2/2 adhesive in ECG leads, consider switch to alternate leads (silicone based, if available).  Recommend topical triamcinolone 0.1% ointment BID x1-2 weeks, then stop.

## 2021-10-11 ENCOUNTER — APPOINTMENT (OUTPATIENT)
Dept: CARDIOTHORACIC SURGERY | Facility: HOSPITAL | Age: 71
End: 2021-10-11

## 2021-10-11 ENCOUNTER — RESULT REVIEW (OUTPATIENT)
Age: 71
End: 2021-10-11

## 2021-10-11 LAB
ALBUMIN SERPL ELPH-MCNC: 2.5 G/DL — LOW (ref 3.3–5)
ALP SERPL-CCNC: 92 U/L — SIGNIFICANT CHANGE UP (ref 40–120)
ALT FLD-CCNC: 8 U/L — LOW (ref 10–45)
ANION GAP SERPL CALC-SCNC: 14 MMOL/L — SIGNIFICANT CHANGE UP (ref 5–17)
ANISOCYTOSIS BLD QL: SLIGHT — SIGNIFICANT CHANGE UP
APTT BLD: 39.5 SEC — HIGH (ref 27.5–35.5)
AST SERPL-CCNC: 36 U/L — SIGNIFICANT CHANGE UP (ref 10–40)
BASE EXCESS BLDV CALC-SCNC: -2.2 MMOL/L — LOW (ref -2–2)
BASO STIPL BLD QL SMEAR: PRESENT — SIGNIFICANT CHANGE UP
BASOPHILS # BLD AUTO: 0.18 K/UL — SIGNIFICANT CHANGE UP (ref 0–0.2)
BASOPHILS NFR BLD AUTO: 1 % — SIGNIFICANT CHANGE UP (ref 0–2)
BILIRUB SERPL-MCNC: 1.3 MG/DL — HIGH (ref 0.2–1.2)
BUN SERPL-MCNC: 28 MG/DL — HIGH (ref 7–23)
CA-I SERPL-SCNC: 1.23 MMOL/L — SIGNIFICANT CHANGE UP (ref 1.15–1.33)
CALCIUM SERPL-MCNC: 8.4 MG/DL — SIGNIFICANT CHANGE UP (ref 8.4–10.5)
CHLORIDE BLDV-SCNC: 106 MMOL/L — SIGNIFICANT CHANGE UP (ref 96–108)
CHLORIDE SERPL-SCNC: 105 MMOL/L — SIGNIFICANT CHANGE UP (ref 96–108)
CK MB BLD-MCNC: 11.8 % — HIGH (ref 0–3.5)
CK MB CFR SERPL CALC: 22.8 NG/ML — HIGH (ref 0–6.7)
CK SERPL-CCNC: 193 U/L — SIGNIFICANT CHANGE UP (ref 30–200)
CO2 BLDV-SCNC: 28 MMOL/L — HIGH (ref 22–26)
CO2 SERPL-SCNC: 23 MMOL/L — SIGNIFICANT CHANGE UP (ref 22–31)
CREAT SERPL-MCNC: 1.12 MG/DL — SIGNIFICANT CHANGE UP (ref 0.5–1.3)
ELLIPTOCYTES BLD QL SMEAR: SLIGHT — SIGNIFICANT CHANGE UP
EOSINOPHIL # BLD AUTO: 0 K/UL — SIGNIFICANT CHANGE UP (ref 0–0.5)
EOSINOPHIL NFR BLD AUTO: 0 % — SIGNIFICANT CHANGE UP (ref 0–6)
FIBRINOGEN PPP-MCNC: 302 MG/DL — SIGNIFICANT CHANGE UP (ref 290–520)
GAS PNL BLDA: SIGNIFICANT CHANGE UP
GAS PNL BLDV: 137 MMOL/L — SIGNIFICANT CHANGE UP (ref 136–145)
GAS PNL BLDV: SIGNIFICANT CHANGE UP
GAS PNL BLDV: SIGNIFICANT CHANGE UP
GLUCOSE BLDC GLUCOMTR-MCNC: 143 MG/DL — HIGH (ref 70–99)
GLUCOSE BLDC GLUCOMTR-MCNC: 186 MG/DL — HIGH (ref 70–99)
GLUCOSE BLDC GLUCOMTR-MCNC: 207 MG/DL — HIGH (ref 70–99)
GLUCOSE BLDC GLUCOMTR-MCNC: 230 MG/DL — HIGH (ref 70–99)
GLUCOSE BLDV-MCNC: 125 MG/DL — HIGH (ref 70–99)
GLUCOSE SERPL-MCNC: 125 MG/DL — HIGH (ref 70–99)
HCO3 BLDV-SCNC: 26 MMOL/L — SIGNIFICANT CHANGE UP (ref 22–29)
HCT VFR BLD CALC: 25.4 % — LOW (ref 39–50)
HCT VFR BLDA CALC: 25 % — LOW (ref 39–51)
HGB BLD CALC-MCNC: 8.3 G/DL — LOW (ref 12.6–17.4)
HGB BLD-MCNC: 8 G/DL — LOW (ref 13–17)
HOROWITZ INDEX BLDV+IHG-RTO: 100 — SIGNIFICANT CHANGE UP
INR BLD: 1.25 RATIO — HIGH (ref 0.88–1.16)
LACTATE BLDV-MCNC: 2.1 MMOL/L — HIGH (ref 0.7–2)
LYMPHOCYTES # BLD AUTO: 0.91 K/UL — LOW (ref 1–3.3)
LYMPHOCYTES # BLD AUTO: 5 % — LOW (ref 13–44)
MAGNESIUM SERPL-MCNC: 4.2 MG/DL — HIGH (ref 1.6–2.6)
MANUAL SMEAR VERIFICATION: SIGNIFICANT CHANGE UP
MCHC RBC-ENTMCNC: 23.1 PG — LOW (ref 27–34)
MCHC RBC-ENTMCNC: 31.5 GM/DL — LOW (ref 32–36)
MCV RBC AUTO: 73.2 FL — LOW (ref 80–100)
MONOCYTES # BLD AUTO: 0.73 K/UL — SIGNIFICANT CHANGE UP (ref 0–0.9)
MONOCYTES NFR BLD AUTO: 4 % — SIGNIFICANT CHANGE UP (ref 2–14)
NEPHROCHECK RESULT: 0.26 RISK SCORE — SIGNIFICANT CHANGE UP (ref 0–0.3)
NEUTROPHILS # BLD AUTO: 16.46 K/UL — HIGH (ref 1.8–7.4)
NEUTROPHILS NFR BLD AUTO: 84 % — HIGH (ref 43–77)
NEUTS BAND # BLD: 6 % — SIGNIFICANT CHANGE UP (ref 0–8)
NRBC # BLD: 0 /100 — SIGNIFICANT CHANGE UP (ref 0–0)
PCO2 BLDV: 63 MMHG — HIGH (ref 42–55)
PH BLDV: 7.22 — LOW (ref 7.32–7.43)
PHOSPHATE SERPL-MCNC: 3.8 MG/DL — SIGNIFICANT CHANGE UP (ref 2.5–4.5)
PLAT MORPH BLD: NORMAL — SIGNIFICANT CHANGE UP
PLATELET # BLD AUTO: 116 K/UL — LOW (ref 150–400)
PO2 BLDV: 56 MMHG — HIGH (ref 25–45)
POIKILOCYTOSIS BLD QL AUTO: SLIGHT — SIGNIFICANT CHANGE UP
POLYCHROMASIA BLD QL SMEAR: SLIGHT — SIGNIFICANT CHANGE UP
POTASSIUM BLDV-SCNC: 5 MMOL/L — SIGNIFICANT CHANGE UP (ref 3.5–5.1)
POTASSIUM SERPL-MCNC: 5 MMOL/L — SIGNIFICANT CHANGE UP (ref 3.5–5.3)
POTASSIUM SERPL-SCNC: 5 MMOL/L — SIGNIFICANT CHANGE UP (ref 3.5–5.3)
PROT SERPL-MCNC: 4.3 G/DL — LOW (ref 6–8.3)
PROTHROM AB SERPL-ACNC: 14.8 SEC — HIGH (ref 10.6–13.6)
RBC # BLD: 3.47 M/UL — LOW (ref 4.2–5.8)
RBC # FLD: 21.1 % — HIGH (ref 10.3–14.5)
RBC BLD AUTO: ABNORMAL
SAO2 % BLDV: 85.9 % — SIGNIFICANT CHANGE UP (ref 67–88)
SODIUM SERPL-SCNC: 142 MMOL/L — SIGNIFICANT CHANGE UP (ref 135–145)
TROPONIN T, HIGH SENSITIVITY RESULT: 815 NG/L — HIGH (ref 0–51)
WBC # BLD: 18.29 K/UL — HIGH (ref 3.8–10.5)
WBC # FLD AUTO: 18.29 K/UL — HIGH (ref 3.8–10.5)

## 2021-10-11 PROCEDURE — 99292 CRITICAL CARE ADDL 30 MIN: CPT

## 2021-10-11 PROCEDURE — 33430 REPLACEMENT OF MITRAL VALVE: CPT

## 2021-10-11 PROCEDURE — 33202 INSERT EPICARD ELTRD OPEN: CPT

## 2021-10-11 PROCEDURE — 33530 CORONARY ARTERY BYPASS/REOP: CPT

## 2021-10-11 PROCEDURE — 33465 REPLACE TRICUSPID VALVE: CPT

## 2021-10-11 PROCEDURE — 93010 ELECTROCARDIOGRAM REPORT: CPT

## 2021-10-11 PROCEDURE — 88305 TISSUE EXAM BY PATHOLOGIST: CPT | Mod: 26

## 2021-10-11 PROCEDURE — 71045 X-RAY EXAM CHEST 1 VIEW: CPT | Mod: 26,76

## 2021-10-11 PROCEDURE — 99291 CRITICAL CARE FIRST HOUR: CPT

## 2021-10-11 PROCEDURE — 88300 SURGICAL PATH GROSS: CPT | Mod: 26

## 2021-10-11 PROCEDURE — 33235 REMOVAL PACEMAKER ELECTRODE: CPT

## 2021-10-11 RX ORDER — ALBUMIN HUMAN 25 %
250 VIAL (ML) INTRAVENOUS ONCE
Refills: 0 | Status: COMPLETED | OUTPATIENT
Start: 2021-10-11 | End: 2021-10-11

## 2021-10-11 RX ORDER — CEFUROXIME AXETIL 250 MG
1500 TABLET ORAL EVERY 8 HOURS
Refills: 0 | Status: COMPLETED | OUTPATIENT
Start: 2021-10-11 | End: 2021-10-12

## 2021-10-11 RX ORDER — MUPIROCIN 20 MG/G
1 OINTMENT TOPICAL
Refills: 0 | Status: COMPLETED | OUTPATIENT
Start: 2021-10-11 | End: 2021-10-16

## 2021-10-11 RX ORDER — PANTOPRAZOLE SODIUM 20 MG/1
40 TABLET, DELAYED RELEASE ORAL DAILY
Refills: 0 | Status: DISCONTINUED | OUTPATIENT
Start: 2021-10-11 | End: 2021-10-14

## 2021-10-11 RX ORDER — HYDROMORPHONE HYDROCHLORIDE 2 MG/ML
0.5 INJECTION INTRAMUSCULAR; INTRAVENOUS; SUBCUTANEOUS EVERY 6 HOURS
Refills: 0 | Status: DISCONTINUED | OUTPATIENT
Start: 2021-10-11 | End: 2021-10-13

## 2021-10-11 RX ORDER — VASOPRESSIN 20 [USP'U]/ML
0.07 INJECTION INTRAVENOUS
Qty: 50 | Refills: 0 | Status: DISCONTINUED | OUTPATIENT
Start: 2021-10-11 | End: 2021-10-15

## 2021-10-11 RX ORDER — SODIUM CHLORIDE 9 MG/ML
250 INJECTION, SOLUTION INTRAVENOUS ONCE
Refills: 0 | Status: COMPLETED | OUTPATIENT
Start: 2021-10-11 | End: 2021-10-11

## 2021-10-11 RX ORDER — SODIUM CHLORIDE 9 MG/ML
1000 INJECTION INTRAMUSCULAR; INTRAVENOUS; SUBCUTANEOUS
Refills: 0 | Status: DISCONTINUED | OUTPATIENT
Start: 2021-10-11 | End: 2021-10-19

## 2021-10-11 RX ORDER — POTASSIUM CHLORIDE 20 MEQ
10 PACKET (EA) ORAL
Refills: 0 | Status: DISCONTINUED | OUTPATIENT
Start: 2021-10-11 | End: 2021-10-12

## 2021-10-11 RX ORDER — AMIODARONE HYDROCHLORIDE 400 MG/1
400 TABLET ORAL
Refills: 0 | Status: DISCONTINUED | OUTPATIENT
Start: 2021-10-11 | End: 2021-10-13

## 2021-10-11 RX ORDER — DEXTROSE 50 % IN WATER 50 %
25 SYRINGE (ML) INTRAVENOUS
Refills: 0 | Status: DISCONTINUED | OUTPATIENT
Start: 2021-10-11 | End: 2021-10-17

## 2021-10-11 RX ORDER — ACETAMINOPHEN 500 MG
650 TABLET ORAL EVERY 6 HOURS
Refills: 0 | Status: DISCONTINUED | OUTPATIENT
Start: 2021-10-14 | End: 2021-10-19

## 2021-10-11 RX ORDER — SODIUM BICARBONATE 1 MEQ/ML
50 SYRINGE (ML) INTRAVENOUS ONCE
Refills: 0 | Status: COMPLETED | OUTPATIENT
Start: 2021-10-11 | End: 2021-10-11

## 2021-10-11 RX ORDER — INSULIN HUMAN 100 [IU]/ML
3 INJECTION, SOLUTION SUBCUTANEOUS
Qty: 100 | Refills: 0 | Status: DISCONTINUED | OUTPATIENT
Start: 2021-10-11 | End: 2021-10-14

## 2021-10-11 RX ORDER — OXYCODONE HYDROCHLORIDE 5 MG/1
10 TABLET ORAL EVERY 4 HOURS
Refills: 0 | Status: DISCONTINUED | OUTPATIENT
Start: 2021-10-11 | End: 2021-10-18

## 2021-10-11 RX ORDER — AMINOCAPROIC ACID 500 MG/1
5 TABLET ORAL ONCE
Refills: 0 | Status: COMPLETED | OUTPATIENT
Start: 2021-10-11 | End: 2021-10-11

## 2021-10-11 RX ORDER — DOBUTAMINE HCL 250MG/20ML
2.5 VIAL (ML) INTRAVENOUS
Qty: 500 | Refills: 0 | Status: DISCONTINUED | OUTPATIENT
Start: 2021-10-11 | End: 2021-10-15

## 2021-10-11 RX ORDER — DEXTROSE 50 % IN WATER 50 %
50 SYRINGE (ML) INTRAVENOUS
Refills: 0 | Status: DISCONTINUED | OUTPATIENT
Start: 2021-10-11 | End: 2021-10-19

## 2021-10-11 RX ORDER — POLYETHYLENE GLYCOL 3350 17 G/17G
17 POWDER, FOR SOLUTION ORAL DAILY
Refills: 0 | Status: DISCONTINUED | OUTPATIENT
Start: 2021-10-12 | End: 2021-10-19

## 2021-10-11 RX ORDER — CHLORHEXIDINE GLUCONATE 213 G/1000ML
5 SOLUTION TOPICAL
Refills: 0 | Status: DISCONTINUED | OUTPATIENT
Start: 2021-10-11 | End: 2021-10-13

## 2021-10-11 RX ORDER — ASCORBIC ACID 60 MG
500 TABLET,CHEWABLE ORAL
Refills: 0 | Status: COMPLETED | OUTPATIENT
Start: 2021-10-11 | End: 2021-10-16

## 2021-10-11 RX ORDER — SENNA PLUS 8.6 MG/1
2 TABLET ORAL AT BEDTIME
Refills: 0 | Status: DISCONTINUED | OUTPATIENT
Start: 2021-10-12 | End: 2021-10-19

## 2021-10-11 RX ORDER — CHLORHEXIDINE GLUCONATE 213 G/1000ML
1 SOLUTION TOPICAL DAILY
Refills: 0 | Status: DISCONTINUED | OUTPATIENT
Start: 2021-10-11 | End: 2021-10-19

## 2021-10-11 RX ORDER — CALCIUM GLUCONATE 100 MG/ML
1 VIAL (ML) INTRAVENOUS ONCE
Refills: 0 | Status: COMPLETED | OUTPATIENT
Start: 2021-10-11 | End: 2021-10-11

## 2021-10-11 RX ORDER — ACETAMINOPHEN 500 MG
650 TABLET ORAL EVERY 6 HOURS
Refills: 0 | Status: COMPLETED | OUTPATIENT
Start: 2021-10-11 | End: 2021-10-14

## 2021-10-11 RX ORDER — OXYCODONE HYDROCHLORIDE 5 MG/1
5 TABLET ORAL EVERY 4 HOURS
Refills: 0 | Status: DISCONTINUED | OUTPATIENT
Start: 2021-10-11 | End: 2021-10-18

## 2021-10-11 RX ORDER — NOREPINEPHRINE BITARTRATE/D5W 8 MG/250ML
0.12 PLASTIC BAG, INJECTION (ML) INTRAVENOUS
Qty: 8 | Refills: 0 | Status: DISCONTINUED | OUTPATIENT
Start: 2021-10-11 | End: 2021-10-15

## 2021-10-11 RX ORDER — GABAPENTIN 400 MG/1
100 CAPSULE ORAL EVERY 8 HOURS
Refills: 0 | Status: COMPLETED | OUTPATIENT
Start: 2021-10-11 | End: 2021-10-16

## 2021-10-11 RX ORDER — MEPERIDINE HYDROCHLORIDE 50 MG/ML
25 INJECTION INTRAMUSCULAR; INTRAVENOUS; SUBCUTANEOUS ONCE
Refills: 0 | Status: DISCONTINUED | OUTPATIENT
Start: 2021-10-11 | End: 2021-10-12

## 2021-10-11 RX ADMIN — Medication 100 MILLIGRAM(S): at 16:38

## 2021-10-11 RX ADMIN — SODIUM CHLORIDE 3 MILLILITER(S): 9 INJECTION INTRAMUSCULAR; INTRAVENOUS; SUBCUTANEOUS at 04:43

## 2021-10-11 RX ADMIN — VASOPRESSIN 4 UNIT(S)/MIN: 20 INJECTION INTRAVENOUS at 15:24

## 2021-10-11 RX ADMIN — SODIUM CHLORIDE 1000 MILLILITER(S): 9 INJECTION, SOLUTION INTRAVENOUS at 16:37

## 2021-10-11 RX ADMIN — Medication 125 MILLILITER(S): at 17:36

## 2021-10-11 RX ADMIN — Medication 14.2 MICROGRAM(S)/KG/MIN: at 15:25

## 2021-10-11 RX ADMIN — CHLORHEXIDINE GLUCONATE 5 MILLILITER(S): 213 SOLUTION TOPICAL at 18:13

## 2021-10-11 RX ADMIN — Medication 9.45 MICROGRAM(S)/KG/MIN: at 15:25

## 2021-10-11 RX ADMIN — Medication 25 MICROGRAM(S): at 04:51

## 2021-10-11 RX ADMIN — Medication 50 MILLIEQUIVALENT(S): at 19:40

## 2021-10-11 RX ADMIN — Medication 2000 MILLIGRAM(S): at 06:16

## 2021-10-11 RX ADMIN — Medication 1 APPLICATION(S): at 04:52

## 2021-10-11 RX ADMIN — AMINOCAPROIC ACID 250 GRAM(S): 500 TABLET ORAL at 15:15

## 2021-10-11 RX ADMIN — GABAPENTIN 300 MILLIGRAM(S): 400 CAPSULE ORAL at 06:16

## 2021-10-11 RX ADMIN — MUPIROCIN 1 APPLICATION(S): 20 OINTMENT TOPICAL at 19:01

## 2021-10-11 RX ADMIN — CHLORHEXIDINE GLUCONATE 15 MILLILITER(S): 213 SOLUTION TOPICAL at 04:52

## 2021-10-11 RX ADMIN — Medication 125 MILLILITER(S): at 18:38

## 2021-10-11 RX ADMIN — Medication 40 MILLIGRAM(S): at 04:51

## 2021-10-11 RX ADMIN — Medication 100 GRAM(S): at 18:37

## 2021-10-11 RX ADMIN — Medication 1000 MILLIGRAM(S): at 06:16

## 2021-10-11 RX ADMIN — Medication 125 MILLILITER(S): at 17:37

## 2021-10-11 RX ADMIN — Medication 1000 MILLIGRAM(S): at 06:40

## 2021-10-11 RX ADMIN — MUPIROCIN 1 APPLICATION(S): 20 OINTMENT TOPICAL at 04:51

## 2021-10-11 RX ADMIN — Medication 125 MILLILITER(S): at 19:40

## 2021-10-11 NOTE — PROGRESS NOTE ADULT - SUBJECTIVE AND OBJECTIVE BOX
CRITICAL CARE ATTENDING - CTICU    MEDICATIONS  (STANDING):  acetaminophen   Tablet .. 650 milliGRAM(s) Oral every 6 hours  aminocaproic acid IVPB 5 Gram(s) IV Intermittent once  aMIOdarone    Tablet 400 milliGRAM(s) Oral two times a day  ascorbic acid 500 milliGRAM(s) Oral two times a day  cefuroxime  IVPB 1500 milliGRAM(s) IV Intermittent every 8 hours  chlorhexidine 0.12% Liquid 5 milliLiter(s) Oral Mucosa two times a day  chlorhexidine 2% Cloths 1 Application(s) Topical daily  dextrose 50% Injectable 50 milliLiter(s) IV Push every 15 minutes  dextrose 50% Injectable 25 milliLiter(s) IV Push every 15 minutes  DOBUTamine Infusion 5 MICROgram(s)/kG/Min (9.45 mL/Hr) IV Continuous <Continuous>  gabapentin 100 milliGRAM(s) Oral every 8 hours  insulin regular Infusion 3 Unit(s)/Hr (3 mL/Hr) IV Continuous <Continuous>  lactated ringers Bolus 250 milliLiter(s) IV Bolus once  meperidine     Injectable 25 milliGRAM(s) IV Push once  mupirocin 2% Ointment 1 Application(s) Both Nostrils two times a day  norepinephrine Infusion 0.12 MICROgram(s)/kG/Min (14.2 mL/Hr) IV Continuous <Continuous>  pantoprazole  Injectable 40 milliGRAM(s) IV Push daily  potassium chloride  10 mEq/50 mL IVPB 10 milliEquivalent(s) IV Intermittent every 1 hour  potassium chloride  10 mEq/50 mL IVPB 10 milliEquivalent(s) IV Intermittent every 1 hour  potassium chloride  10 mEq/50 mL IVPB 10 milliEquivalent(s) IV Intermittent every 1 hour  sodium chloride 0.9%. 1000 milliLiter(s) (10 mL/Hr) IV Continuous <Continuous>  vasopressin Infusion 0.067 Unit(s)/Min (4 mL/Hr) IV Continuous <Continuous>                                    8.0    18.29 )-----------( 116      ( 11 Oct 2021 15:22 )             25.4       10-10    137  |  102  |  23  ----------------------------<  102<H>  3.8   |  21<L>  |  0.92    Ca    9.1      10 Oct 2021 05:28    TPro  6.6  /  Alb  3.5  /  TBili  0.7  /  DBili  x   /  AST  22  /  ALT  12  /  AlkPhos  169<H>  10-10      PTT - ( 10 Oct 2021 05:28 )  PTT:88.9 sec    Mode: AC/ CMV (Assist Control/ Continuous Mandatory Ventilation)  RR (machine): 16  TV (machine): 500  FiO2: 100  PEEP: 8  ITime: 1  MAP: 9  PIP: 18      Daily Height in cm: 167.64 (11 Oct 2021 10:28)    Daily       10-10 @ 07:01  -  10-11 @ 07:00  --------------------------------------------------------  IN: 898.5 mL / OUT: 1050 mL / NET: -151.5 mL    10-11 @ 07:01  -  10-11 @ 15:47  --------------------------------------------------------  IN: 0 mL / OUT: 350 mL / NET: -350 mL        Critically Ill patient  : [ ] preoperative ,   [x ] post operative    Requires :  [x ] Arterial Line   [x ] Central Line  [ ] PA catheter  [ ] IABP  [ ] ECMO  [ ] LVAD  [ x] Ventilator  [ ] pacemaker - TPM [ ] Impella.                      [x ] ABG's     [ x] Pulse Oxymetry Monitoring  Bedside evaluation , monitoring , treatment of hemodynamics , fluids , IVP/ IVCD meds.        Diagnosis:     Op day -  MVR / TV repair / LAAL    Hypotension     Hypovolemia    CHF- acute [ x]   chronic [ x]    systolic [ x]   diatolic [ ]          - Echo- EF -   NL          [x ] RV dysfunction          - Cxr-cardiomegally, edema          - Clinical-  [x ]inotropes   [ x]pressors   [ ]diuresis   [ ]IABP   [ ]ECMO   [ ]LVAD   [ ]Respiratory Failure    Hemodynamic lability,  instability. Requires IVCD [ x] vasopressors [x ] inotropes  [ ] vasodilator  [x ]IVSS fluid  to maintain MAP, perfusion, C.I.     Temporary pacemaker (TPM) interrogation and setting.     Chest Tube Drainage - post op bleeding     Ventilator Management:  [ x]AC-rest post op    [x]CPAP-PS Wean this PM    [ ]Trach Collar     [ ]Extubate    [ ] T-Piece  [ ]peep>5     Requires chest PT, pulmonary toilet, ambu bagging, suctioning to maintain SaO2,  patent airway and treat atelectasis.     Requires bedside physical therapy, mobilization and total skilled nursing care.     Thrombocytopenia                         -                     Discussed with CT surgeon, Physician's Assistant - Nurse Practitioner- Critical care medicine team.   Discussed at  AM / PM rounds.   Chart, labs , films reviewed.    Cumulative Critical Care Time Given Today : 30 min

## 2021-10-11 NOTE — PROGRESS NOTE ADULT - SUBJECTIVE AND OBJECTIVE BOX
EDISON ROBLES  MRN-[id number]  Patient is a 71y old  Male who presents with a chief complaint of SOB/Open Heart Surg eval (11 Oct 2021 15:47)    HPI:  Patient is a 70M PMH HTN, CAD s/p CABG in 2009, Complete Heart Block s/p PPM placement, Paroxysmal Afib on coumadin, Aortic stenosis s/p Mechanical AVR who admitted to OSH with acute heart failure exacerbation/sob/LAU.  Patient states he has to stop and catch his breath when walking up stairs. On echo to have low normal EF 50-55%, Mild reduced RV fx, mod-severe MR and severe TR. Transferred to [facility] for open heart surgery with Dr. Hua. Patient endorses shortness of breath and LAU, denies chest pain, abdominal pain, fevers, chills, nausea.  (05 Oct 2021 23:42)      Surgery/Hospital course:  10/11 MVR, TVR, LAAL, endocardial lead extraction, epicardial lead placement     Today/Overnight:  Status post mitral and tricuspid valve replacement, left atrial appendage ligation, endocardial lead extraction, and epicardial lead placement,     Vital Signs Last 24 Hrs  T(C): 36.8 (11 Oct 2021 04:17), Max: 36.8 (10 Oct 2021 18:46)  T(F): 98.2 (11 Oct 2021 04:17), Max: 98.2 (10 Oct 2021 18:46)  HR: 97 (11 Oct 2021 17:00) (67 - 97)  BP: 92/55 (11 Oct 2021 04:17) (92/55 - 93/55)  BP(mean): --  RR: 18 (11 Oct 2021 17:00) (12 - 18)  SpO2: 100% (11 Oct 2021 17:00) (97% - 100%)  ============================I/O===========================  I&O's Summary    10 Oct 2021 07:01  -  11 Oct 2021 07:00  --------------------------------------------------------  IN: 898.5 mL / OUT: 1050 mL / NET: -151.5 mL    11 Oct 2021 07:01  -  11 Oct 2021 17:55  --------------------------------------------------------  IN: 659.5 mL / OUT: 680 mL / NET: -20.5 mL      ============================ LABS =========================                        8.0    18.29 )-----------( 116      ( 11 Oct 2021 15:22 )             25.4     10-11    142  |  105  |  28<H>  ----------------------------<  125<H>  5.0   |  23  |  1.12    Ca    8.4      11 Oct 2021 15:22  Phos  3.8     10-11  Mg     4.2     10-11    TPro  4.3<L>  /  Alb  2.5<L>  /  TBili  1.3<H>  /  DBili  x   /  AST  36  /  ALT  8<L>  /  AlkPhos  92  10-11    LIVER FUNCTIONS - ( 11 Oct 2021 15:22 )  Alb: 2.5 g/dL / Pro: 4.3 g/dL / ALK PHOS: 92 U/L / ALT: 8 U/L / AST: 36 U/L / GGT: x           PT/INR - ( 11 Oct 2021 15:22 )   PT: 14.8 sec;   INR: 1.25 ratio         PTT - ( 11 Oct 2021 15:22 )  PTT:39.5 sec  ABG - ( 11 Oct 2021 17:31 )  pH, Arterial: 7.25  pH, Blood: x     /  pCO2: 53    /  pO2: 122   / HCO3: 23    / Base Excess: -4.0  /  SaO2: 99.6                ======================Micro/Rad/Cardio=================   CXR: Reviewed  Echo: Reviewed  ======================================================  PAST MEDICAL & SURGICAL HISTORY:  HTN (hypertension)    Complete heart block    CAD (coronary artery disease)    Paroxysmal atrial fibrillation    History of mechanical aortic valve replacement    History of pacemaker    S/P CABG (coronary artery bypass graft)      ========================ASSESSMENT ================  MR/TR, afib s/p MVR, TVR, LAAL, endocardial lead extraction, epicardial lead placement on 10/11   Hx of tachy-arely syndrome, 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 650 milliGRAM(s) Oral every 6 hours  gabapentin 100 milliGRAM(s) Oral every 8 hours  HYDROmorphone  Injectable 0.5 milliGRAM(s) IV Push every 6 hours PRN Severe Pain (7 - 10)  oxyCODONE    IR 5 milliGRAM(s) Oral every 4 hours PRN Moderate Pain (4 - 6)  oxyCODONE    IR 10 milliGRAM(s) Oral every 4 hours PRN Severe Pain (7 - 10)    ==================== RESPIRATORY======================  Respiratory status required full ventilatory support, close monitoring of respiratory rate and breathing pattern, the following of ABG’s with A-line monitoring, continuous pulse oximetry monitoring     Mechanical Ventilation:  Mode: AC/ CMV (Assist Control/ Continuous Mandatory Ventilation)  RR (machine): 18  TV (machine): 500  FiO2: 70  PEEP: 8  ITime: 1  MAP: 9  PIP: 18      ====================CARDIOVASCULAR==================  Mitral and tricuspid regurgitation, atrial fibrillation status post mitral and tricuspid valve replacement, left atrial appendage ligation, endocardial lead extraction, and epicardial lead placement on 10/11. Continue rate control with Amiodarone. IV Levophed infusion for vasogenic shock. Inotropic support with IV Dobutamine infusion for systolic heart failure. Lactate elevated to 3.5, continue trending to monitor for fluid resuscitation as indicated. Invasive hemodynamic monitoring with a central venous catheter & an A-line were required for the continuous central venous and MAP/BP monitoring to ensure adequate cardiovascular support. Back up pacing wires in place.     ===================HEMATOLOGIC/ONC ===================  Thrombocytopenia and acute blood loss anemia, monitor hemoglobin and hematocrit levels    ===================== RENAL =========================  Optimize renal perfusion with adequate volume resuscitation and continued monitoring of urine output, fluid balance, BUN/Creatinine.     ==================== GASTROINTESTINAL===================  NPO, will advance patient diet as tolerated. Protonix for stress ulcer prophylaxis.     =======================    ENDOCRINE  =====================  Metabolic stability, stress hyperglycemia required an IV regular Insulin drip while following serial glucose levels to help achieve and maintain euglycemia.      ========================INFECTIOUS DISEASE================  Afebrile, white blood count rising 9.30->18.29. Continue trending white blood count and monitoring fever curve. Perioperative coverage with Cefuroxime.       Patient requires continuous monitoring with bedside rhythm monitoring, pulse oximetry monitoring, and continuous central venous and arterial pressure monitoring; and intermittent blood gas analysis.  Care plan discussed with ICU care team.    Patient remained critical, at risk for life threatening decompensation.   I have spent ___ minutes providing acute care with multiple re-evaluations throughout the evening.     By signing my name below, I, Caitlin Virk, attest that this documentation has been prepared under the direction and in the presence of Deanna Esquivel MD   Electronically signed: Bill Lainez, 10-11-21 @ 17:55    I, Deanna Esquivel, personally performed the services described in this documentation. All medical record entries made by the ezibbailey were at my direction and in my presence. I have reviewed the chart and agree that the record reflects my personal performance and is accurate and complete  Electronically signed: Deanna Esquivel MD 10-11-21 @ 17:55       EDISON ROBLES  MRN-[id number]  Patient is a 71y old  Male who presents with a chief complaint of SOB/Open Heart Surg eval (11 Oct 2021 15:47)    HPI:  Patient is a 70M PMH HTN, CAD s/p CABG in 2009, Complete Heart Block s/p PPM placement, Paroxysmal Afib on coumadin, Aortic stenosis s/p Mechanical AVR who admitted to OSH with acute heart failure exacerbation/sob/LAU.  Patient states he has to stop and catch his breath when walking up stairs. On echo to have low normal EF 50-55%, Mild reduced RV fx, mod-severe MR and severe TR. Transferred to [facility] for open heart surgery with Dr. Hua. Patient endorses shortness of breath and LAU, denies chest pain, abdominal pain, fevers, chills, nausea.  (05 Oct 2021 23:42)    Surgery/Hospital course:  10/11 MVR, TVR, LAAL, endocardial lead extraction, epicardial lead placement     Today/Overnight:  Status post mitral and tricuspid valve replacement, left atrial appendage ligation, endocardial lead extraction, and epicardial lead placement, has had metabolic and respiratory acidosis requiring volume resuscitation and ventilator adjustments    Vital Signs Last 24 Hrs  T(C): 36.8 (11 Oct 2021 04:17), Max: 36.8 (10 Oct 2021 18:46)  T(F): 98.2 (11 Oct 2021 04:17), Max: 98.2 (10 Oct 2021 18:46)  HR: 97 (11 Oct 2021 17:00) (67 - 97)  BP: 92/55 (11 Oct 2021 04:17) (92/55 - 93/55)  BP(mean): --  RR: 18 (11 Oct 2021 17:00) (12 - 18)  SpO2: 100% (11 Oct 2021 17:00) (97% - 100%)  ============================I/O===========================  I&O's Summary    10 Oct 2021 07:01  -  11 Oct 2021 07:00  --------------------------------------------------------  IN: 898.5 mL / OUT: 1050 mL / NET: -151.5 mL    11 Oct 2021 07:01  -  11 Oct 2021 17:55  --------------------------------------------------------  IN: 659.5 mL / OUT: 680 mL / NET: -20.5 mL      ============================ LABS =========================                        8.0    18.29 )-----------( 116      ( 11 Oct 2021 15:22 )             25.4     10-11    142  |  105  |  28<H>  ----------------------------<  125<H>  5.0   |  23  |  1.12    Ca    8.4      11 Oct 2021 15:22  Phos  3.8     10-11  Mg     4.2     10-11    TPro  4.3<L>  /  Alb  2.5<L>  /  TBili  1.3<H>  /  DBili  x   /  AST  36  /  ALT  8<L>  /  AlkPhos  92  10-11    LIVER FUNCTIONS - ( 11 Oct 2021 15:22 )  Alb: 2.5 g/dL / Pro: 4.3 g/dL / ALK PHOS: 92 U/L / ALT: 8 U/L / AST: 36 U/L / GGT: x           PT/INR - ( 11 Oct 2021 15:22 )   PT: 14.8 sec;   INR: 1.25 ratio         PTT - ( 11 Oct 2021 15:22 )  PTT:39.5 sec  ABG - ( 11 Oct 2021 17:31 )  pH, Arterial: 7.25  pH, Blood: x     /  pCO2: 53    /  pO2: 122   / HCO3: 23    / Base Excess: -4.0  /  SaO2: 99.6      ======================Micro/Rad/Cardio=================   CXR: Reviewed  Echo: Reviewed  ======================================================  PAST MEDICAL & SURGICAL HISTORY:  HTN (hypertension)    Complete heart block    CAD (coronary artery disease)    Paroxysmal atrial fibrillation    History of mechanical aortic valve replacement    History of pacemaker    S/P CABG (coronary artery bypass graft)    ========================ASSESSMENT ================  MR/TR, afib s/p MVR, TVR, LAAL, endocardial lead extraction, epicardial lead placement on 10/11   Hx of tachy-arely syndrome, 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   Tylenol, Gabapentin, PRN Dilaudid, and PRN Oxycodone for analgesia     acetaminophen   Tablet .. 650 milliGRAM(s) Oral every 6 hours  gabapentin 100 milliGRAM(s) Oral every 8 hours  HYDROmorphone  Injectable 0.5 milliGRAM(s) IV Push every 6 hours PRN Severe Pain (7 - 10)  oxyCODONE    IR 5 milliGRAM(s) Oral every 4 hours PRN Moderate Pain (4 - 6)  oxyCODONE    IR 10 milliGRAM(s) Oral every 4 hours PRN Severe Pain (7 - 10)    ==================== RESPIRATORY======================  Respiratory status required full ventilatory support, close monitoring of respiratory rate and breathing pattern, the following of ABG’s with A-line monitoring, continuous pulse oximetry monitoring. Ventilator adjusted for respiratory acidosis. Chest xray repeated without evidence of parenchymal abnormality, pneumothorax or collapse.    Mechanical Ventilation:  Mode: AC/ CMV (Assist Control/ Continuous Mandatory Ventilation)  RR (machine): 20  TV (machine): 500  FiO2: 45  PEEP: 8  ITime: 1  MAP: 9  PIP: 18      ====================CARDIOVASCULAR==================  Mitral and tricuspid regurgitation, atrial fibrillation status post mitral and tricuspid valve replacement, left atrial appendage ligation, endocardial lead extraction, and epicardial lead placement on 10/11. Continue rate and rhythm control with enteral amiodarone. Patient with mild right ventricular systolic dysfunction as well as risk of left ventricular decompensation s/p MVR/TVR and current vasodilatory shock requires an IV Levophed infusion and inotropic support with an IV Dobutamine infusion. Lactate elevated to 4.3, continue trending to monitor for fluid resuscitation as indicated. Invasive hemodynamic monitoring with a central venous catheter & an A-line were required for the continuous central venous and MAP/BP monitoring to ensure adequate cardiovascular support. Back up pacing wires in place.     ===================HEMATOLOGIC/ONC ===================  Moderate post operative mediastinal bleeding, thrombocytopenia and acute blood loss anemia, one unit of packed red blood cells ordered, continue to monitor hemoglobin and hematocrit levels.    ===================== RENAL =========================  Optimize renal perfusion with adequate volume resuscitation and continued monitoring of urine output, fluid balance, BUN/Creatinine.     ==================== GASTROINTESTINAL===================  NPO, will advance patient diet as tolerated. Protonix for stress ulcer prophylaxis.     =======================    ENDOCRINE  =====================  Metabolic stability, stress hyperglycemia required an IV regular Insulin drip while following serial glucose levels to help achieve and maintain euglycemia.      ========================INFECTIOUS DISEASE================  Afebrile, white blood count rising 9.30->18.29. Continue trending white blood count and monitoring fever curve. Perioperative coverage with Cefuroxime.       Patient requires continuous monitoring with bedside rhythm monitoring, pulse oximetry monitoring, and continuous central venous and arterial pressure monitoring; and intermittent blood gas analysis.  Care plan discussed with ICU care team.    Patient remained critical, at risk for life threatening decompensation.   I have spent 45 minutes providing acute care with multiple re-evaluations throughout the evening.     By signing my name below, I, Caitlin Virk, attest that this documentation has been prepared under the direction and in the presence of Deanna Esquivel MD   Electronically signed: Bill Lainez, 10-11-21 @ 17:55    I, Deanna Esquivel, personally performed the services described in this documentation. All medical record entries made by the ezibbailey were at my direction and in my presence. I have reviewed the chart and agree that the record reflects my personal performance and is accurate and complete  Electronically signed: Deanna Esquivel MD 10-11-21 @ 17:55

## 2021-10-12 ENCOUNTER — TRANSCRIPTION ENCOUNTER (OUTPATIENT)
Age: 71
End: 2021-10-12

## 2021-10-12 LAB
ALBUMIN SERPL ELPH-MCNC: 3.6 G/DL — SIGNIFICANT CHANGE UP (ref 3.3–5)
ALP SERPL-CCNC: 72 U/L — SIGNIFICANT CHANGE UP (ref 40–120)
ALT FLD-CCNC: 17 U/L — SIGNIFICANT CHANGE UP (ref 10–45)
ANION GAP SERPL CALC-SCNC: 12 MMOL/L — SIGNIFICANT CHANGE UP (ref 5–17)
ANION GAP SERPL CALC-SCNC: 15 MMOL/L — SIGNIFICANT CHANGE UP (ref 5–17)
APTT BLD: 48.1 SEC — HIGH (ref 27.5–35.5)
APTT BLD: 51.8 SEC — HIGH (ref 27.5–35.5)
AST SERPL-CCNC: 61 U/L — HIGH (ref 10–40)
BASE EXCESS BLDV CALC-SCNC: -1.8 MMOL/L — SIGNIFICANT CHANGE UP (ref -2–2)
BASE EXCESS BLDV CALC-SCNC: -2 MMOL/L — SIGNIFICANT CHANGE UP (ref -2–2)
BASE EXCESS BLDV CALC-SCNC: -4.2 MMOL/L — LOW (ref -2–2)
BILIRUB SERPL-MCNC: 1.2 MG/DL — SIGNIFICANT CHANGE UP (ref 0.2–1.2)
BUN SERPL-MCNC: 29 MG/DL — HIGH (ref 7–23)
BUN SERPL-MCNC: 34 MG/DL — HIGH (ref 7–23)
CALCIUM SERPL-MCNC: 8.6 MG/DL — SIGNIFICANT CHANGE UP (ref 8.4–10.5)
CALCIUM SERPL-MCNC: 8.6 MG/DL — SIGNIFICANT CHANGE UP (ref 8.4–10.5)
CHLORIDE SERPL-SCNC: 102 MMOL/L — SIGNIFICANT CHANGE UP (ref 96–108)
CHLORIDE SERPL-SCNC: 106 MMOL/L — SIGNIFICANT CHANGE UP (ref 96–108)
CO2 BLDV-SCNC: 24 MMOL/L — SIGNIFICANT CHANGE UP (ref 22–26)
CO2 BLDV-SCNC: 26 MMOL/L — SIGNIFICANT CHANGE UP (ref 22–26)
CO2 BLDV-SCNC: 27 MMOL/L — HIGH (ref 22–26)
CO2 SERPL-SCNC: 21 MMOL/L — LOW (ref 22–31)
CO2 SERPL-SCNC: 23 MMOL/L — SIGNIFICANT CHANGE UP (ref 22–31)
CREAT SERPL-MCNC: 1.3 MG/DL — SIGNIFICANT CHANGE UP (ref 0.5–1.3)
CREAT SERPL-MCNC: 1.4 MG/DL — HIGH (ref 0.5–1.3)
GAS PNL BLDA: SIGNIFICANT CHANGE UP
GAS PNL BLDV: SIGNIFICANT CHANGE UP
GLUCOSE BLDC GLUCOMTR-MCNC: 110 MG/DL — HIGH (ref 70–99)
GLUCOSE BLDC GLUCOMTR-MCNC: 113 MG/DL — HIGH (ref 70–99)
GLUCOSE BLDC GLUCOMTR-MCNC: 117 MG/DL — HIGH (ref 70–99)
GLUCOSE BLDC GLUCOMTR-MCNC: 120 MG/DL — HIGH (ref 70–99)
GLUCOSE BLDC GLUCOMTR-MCNC: 127 MG/DL — HIGH (ref 70–99)
GLUCOSE BLDC GLUCOMTR-MCNC: 128 MG/DL — HIGH (ref 70–99)
GLUCOSE BLDC GLUCOMTR-MCNC: 130 MG/DL — HIGH (ref 70–99)
GLUCOSE BLDC GLUCOMTR-MCNC: 138 MG/DL — HIGH (ref 70–99)
GLUCOSE BLDC GLUCOMTR-MCNC: 157 MG/DL — HIGH (ref 70–99)
GLUCOSE BLDC GLUCOMTR-MCNC: 200 MG/DL — HIGH (ref 70–99)
GLUCOSE BLDC GLUCOMTR-MCNC: 225 MG/DL — HIGH (ref 70–99)
GLUCOSE BLDC GLUCOMTR-MCNC: 97 MG/DL — SIGNIFICANT CHANGE UP (ref 70–99)
GLUCOSE SERPL-MCNC: 130 MG/DL — HIGH (ref 70–99)
GLUCOSE SERPL-MCNC: 136 MG/DL — HIGH (ref 70–99)
HCO3 BLDV-SCNC: 23 MMOL/L — SIGNIFICANT CHANGE UP (ref 22–29)
HCO3 BLDV-SCNC: 25 MMOL/L — SIGNIFICANT CHANGE UP (ref 22–29)
HCO3 BLDV-SCNC: 25 MMOL/L — SIGNIFICANT CHANGE UP (ref 22–29)
HCT VFR BLD CALC: 22.5 % — LOW (ref 39–50)
HCT VFR BLD CALC: 23.3 % — LOW (ref 39–50)
HCT VFR BLD CALC: 26.2 % — LOW (ref 39–50)
HGB BLD-MCNC: 7.3 G/DL — LOW (ref 13–17)
HGB BLD-MCNC: 7.6 G/DL — LOW (ref 13–17)
HGB BLD-MCNC: 8.5 G/DL — LOW (ref 13–17)
HOROWITZ INDEX BLDV+IHG-RTO: 60 — SIGNIFICANT CHANGE UP
HOROWITZ INDEX BLDV+IHG-RTO: 70 — SIGNIFICANT CHANGE UP
HOROWITZ INDEX BLDV+IHG-RTO: 80 — SIGNIFICANT CHANGE UP
INR BLD: 1.25 RATIO — HIGH (ref 0.88–1.16)
MAGNESIUM SERPL-MCNC: 3 MG/DL — HIGH (ref 1.6–2.6)
MCHC RBC-ENTMCNC: 25 PG — LOW (ref 27–34)
MCHC RBC-ENTMCNC: 25.8 PG — LOW (ref 27–34)
MCHC RBC-ENTMCNC: 26.1 PG — LOW (ref 27–34)
MCHC RBC-ENTMCNC: 32.4 GM/DL — SIGNIFICANT CHANGE UP (ref 32–36)
MCHC RBC-ENTMCNC: 32.4 GM/DL — SIGNIFICANT CHANGE UP (ref 32–36)
MCHC RBC-ENTMCNC: 32.6 GM/DL — SIGNIFICANT CHANGE UP (ref 32–36)
MCV RBC AUTO: 77.1 FL — LOW (ref 80–100)
MCV RBC AUTO: 79.5 FL — LOW (ref 80–100)
MCV RBC AUTO: 80.1 FL — SIGNIFICANT CHANGE UP (ref 80–100)
NEPHROCHECK RESULT: 0.94 RISK SCORE — HIGH (ref 0–0.3)
NRBC # BLD: 0 /100 WBCS — SIGNIFICANT CHANGE UP (ref 0–0)
PCO2 BLDV: 47 MMHG — SIGNIFICANT CHANGE UP (ref 42–55)
PCO2 BLDV: 48 MMHG — SIGNIFICANT CHANGE UP (ref 42–55)
PCO2 BLDV: 51 MMHG — SIGNIFICANT CHANGE UP (ref 42–55)
PH BLDV: 7.29 — LOW (ref 7.32–7.43)
PH BLDV: 7.3 — LOW (ref 7.32–7.43)
PH BLDV: 7.32 — SIGNIFICANT CHANGE UP (ref 7.32–7.43)
PHOSPHATE SERPL-MCNC: 4.7 MG/DL — HIGH (ref 2.5–4.5)
PLATELET # BLD AUTO: 81 K/UL — LOW (ref 150–400)
PLATELET # BLD AUTO: 81 K/UL — LOW (ref 150–400)
PLATELET # BLD AUTO: 97 K/UL — LOW (ref 150–400)
PO2 BLDV: 29 MMHG — SIGNIFICANT CHANGE UP (ref 25–45)
PO2 BLDV: 32 MMHG — SIGNIFICANT CHANGE UP (ref 25–45)
PO2 BLDV: 37 MMHG — SIGNIFICANT CHANGE UP (ref 25–45)
POTASSIUM SERPL-MCNC: 4.8 MMOL/L — SIGNIFICANT CHANGE UP (ref 3.5–5.3)
POTASSIUM SERPL-MCNC: 5.4 MMOL/L — HIGH (ref 3.5–5.3)
POTASSIUM SERPL-SCNC: 4.8 MMOL/L — SIGNIFICANT CHANGE UP (ref 3.5–5.3)
POTASSIUM SERPL-SCNC: 5.4 MMOL/L — HIGH (ref 3.5–5.3)
PROT SERPL-MCNC: 5.2 G/DL — LOW (ref 6–8.3)
PROTHROM AB SERPL-ACNC: 14.8 SEC — HIGH (ref 10.6–13.6)
RBC # BLD: 2.83 M/UL — LOW (ref 4.2–5.8)
RBC # BLD: 2.91 M/UL — LOW (ref 4.2–5.8)
RBC # BLD: 3.4 M/UL — LOW (ref 4.2–5.8)
RBC # FLD: 20.2 % — HIGH (ref 10.3–14.5)
RBC # FLD: 20.8 % — HIGH (ref 10.3–14.5)
RBC # FLD: 21 % — HIGH (ref 10.3–14.5)
SAO2 % BLDV: 47 % — LOW (ref 67–88)
SAO2 % BLDV: 50.7 % — LOW (ref 67–88)
SAO2 % BLDV: 62.9 % — LOW (ref 67–88)
SODIUM SERPL-SCNC: 135 MMOL/L — SIGNIFICANT CHANGE UP (ref 135–145)
SODIUM SERPL-SCNC: 144 MMOL/L — SIGNIFICANT CHANGE UP (ref 135–145)
WBC # BLD: 15.14 K/UL — HIGH (ref 3.8–10.5)
WBC # BLD: 16.48 K/UL — HIGH (ref 3.8–10.5)
WBC # BLD: 18.29 K/UL — HIGH (ref 3.8–10.5)
WBC # FLD AUTO: 15.14 K/UL — HIGH (ref 3.8–10.5)
WBC # FLD AUTO: 16.48 K/UL — HIGH (ref 3.8–10.5)
WBC # FLD AUTO: 18.29 K/UL — HIGH (ref 3.8–10.5)

## 2021-10-12 PROCEDURE — 99292 CRITICAL CARE ADDL 30 MIN: CPT | Mod: 25

## 2021-10-12 PROCEDURE — 71045 X-RAY EXAM CHEST 1 VIEW: CPT | Mod: 26

## 2021-10-12 PROCEDURE — 99291 CRITICAL CARE FIRST HOUR: CPT

## 2021-10-12 PROCEDURE — 93321 DOPPLER ECHO F-UP/LMTD STD: CPT | Mod: 26

## 2021-10-12 PROCEDURE — 93308 TTE F-UP OR LMTD: CPT | Mod: 26

## 2021-10-12 PROCEDURE — 93010 ELECTROCARDIOGRAM REPORT: CPT

## 2021-10-12 RX ORDER — FUROSEMIDE 40 MG
40 TABLET ORAL ONCE
Refills: 0 | Status: COMPLETED | OUTPATIENT
Start: 2021-10-12 | End: 2021-10-12

## 2021-10-12 RX ORDER — HEPARIN SODIUM 5000 [USP'U]/ML
630 INJECTION INTRAVENOUS; SUBCUTANEOUS
Qty: 25000 | Refills: 0 | Status: DISCONTINUED | OUTPATIENT
Start: 2021-10-12 | End: 2021-10-13

## 2021-10-12 RX ORDER — KETOROLAC TROMETHAMINE 30 MG/ML
15 SYRINGE (ML) INJECTION ONCE
Refills: 0 | Status: DISCONTINUED | OUTPATIENT
Start: 2021-10-12 | End: 2021-10-12

## 2021-10-12 RX ORDER — HYDROMORPHONE HYDROCHLORIDE 2 MG/ML
0.5 INJECTION INTRAMUSCULAR; INTRAVENOUS; SUBCUTANEOUS ONCE
Refills: 0 | Status: DISCONTINUED | OUTPATIENT
Start: 2021-10-12 | End: 2021-10-12

## 2021-10-12 RX ORDER — FUROSEMIDE 40 MG
20 TABLET ORAL ONCE
Refills: 0 | Status: COMPLETED | OUTPATIENT
Start: 2021-10-12 | End: 2021-10-12

## 2021-10-12 RX ORDER — ALBUMIN HUMAN 25 %
250 VIAL (ML) INTRAVENOUS ONCE
Refills: 0 | Status: COMPLETED | OUTPATIENT
Start: 2021-10-12 | End: 2021-10-12

## 2021-10-12 RX ORDER — INSULIN HUMAN 100 [IU]/ML
10 INJECTION, SOLUTION SUBCUTANEOUS ONCE
Refills: 0 | Status: COMPLETED | OUTPATIENT
Start: 2021-10-12 | End: 2021-10-12

## 2021-10-12 RX ORDER — MILRINONE LACTATE 1 MG/ML
0.2 INJECTION, SOLUTION INTRAVENOUS
Qty: 20 | Refills: 0 | Status: DISCONTINUED | OUTPATIENT
Start: 2021-10-12 | End: 2021-10-13

## 2021-10-12 RX ORDER — FENTANYL CITRATE 50 UG/ML
25 INJECTION INTRAVENOUS ONCE
Refills: 0 | Status: DISCONTINUED | OUTPATIENT
Start: 2021-10-12 | End: 2021-10-12

## 2021-10-12 RX ORDER — ASPIRIN/CALCIUM CARB/MAGNESIUM 324 MG
81 TABLET ORAL DAILY
Refills: 0 | Status: DISCONTINUED | OUTPATIENT
Start: 2021-10-12 | End: 2021-10-19

## 2021-10-12 RX ORDER — ATORVASTATIN CALCIUM 80 MG/1
40 TABLET, FILM COATED ORAL AT BEDTIME
Refills: 0 | Status: DISCONTINUED | OUTPATIENT
Start: 2021-10-12 | End: 2021-10-19

## 2021-10-12 RX ORDER — DEXTROSE 50 % IN WATER 50 %
50 SYRINGE (ML) INTRAVENOUS ONCE
Refills: 0 | Status: COMPLETED | OUTPATIENT
Start: 2021-10-12 | End: 2021-10-12

## 2021-10-12 RX ORDER — CALCIUM GLUCONATE 100 MG/ML
1 VIAL (ML) INTRAVENOUS ONCE
Refills: 0 | Status: COMPLETED | OUTPATIENT
Start: 2021-10-12 | End: 2021-10-12

## 2021-10-12 RX ORDER — SODIUM CHLORIDE 9 MG/ML
500 INJECTION, SOLUTION INTRAVENOUS ONCE
Refills: 0 | Status: COMPLETED | OUTPATIENT
Start: 2021-10-12 | End: 2021-10-12

## 2021-10-12 RX ADMIN — SODIUM CHLORIDE 3000 MILLILITER(S): 9 INJECTION, SOLUTION INTRAVENOUS at 01:00

## 2021-10-12 RX ADMIN — Medication 81 MILLIGRAM(S): at 13:36

## 2021-10-12 RX ADMIN — FENTANYL CITRATE 25 MICROGRAM(S): 50 INJECTION INTRAVENOUS at 01:50

## 2021-10-12 RX ADMIN — Medication 9.45 MICROGRAM(S)/KG/MIN: at 21:09

## 2021-10-12 RX ADMIN — GABAPENTIN 100 MILLIGRAM(S): 400 CAPSULE ORAL at 00:39

## 2021-10-12 RX ADMIN — ATORVASTATIN CALCIUM 40 MILLIGRAM(S): 80 TABLET, FILM COATED ORAL at 21:07

## 2021-10-12 RX ADMIN — AMIODARONE HYDROCHLORIDE 400 MILLIGRAM(S): 400 TABLET ORAL at 17:53

## 2021-10-12 RX ADMIN — Medication 100 MILLIGRAM(S): at 01:05

## 2021-10-12 RX ADMIN — Medication 650 MILLIGRAM(S): at 19:17

## 2021-10-12 RX ADMIN — PANTOPRAZOLE SODIUM 40 MILLIGRAM(S): 20 TABLET, DELAYED RELEASE ORAL at 13:35

## 2021-10-12 RX ADMIN — Medication 125 MILLILITER(S): at 14:00

## 2021-10-12 RX ADMIN — Medication 100 GRAM(S): at 18:45

## 2021-10-12 RX ADMIN — HEPARIN SODIUM 6.3 UNIT(S)/HR: 5000 INJECTION INTRAVENOUS; SUBCUTANEOUS at 13:42

## 2021-10-12 RX ADMIN — Medication 500 MILLIGRAM(S): at 17:53

## 2021-10-12 RX ADMIN — Medication 40 MILLIGRAM(S): at 18:44

## 2021-10-12 RX ADMIN — HYDROMORPHONE HYDROCHLORIDE 0.5 MILLIGRAM(S): 2 INJECTION INTRAMUSCULAR; INTRAVENOUS; SUBCUTANEOUS at 19:17

## 2021-10-12 RX ADMIN — HYDROMORPHONE HYDROCHLORIDE 0.5 MILLIGRAM(S): 2 INJECTION INTRAMUSCULAR; INTRAVENOUS; SUBCUTANEOUS at 16:53

## 2021-10-12 RX ADMIN — VASOPRESSIN 4 UNIT(S)/MIN: 20 INJECTION INTRAVENOUS at 21:08

## 2021-10-12 RX ADMIN — FENTANYL CITRATE 25 MICROGRAM(S): 50 INJECTION INTRAVENOUS at 01:30

## 2021-10-12 RX ADMIN — Medication 650 MILLIGRAM(S): at 23:22

## 2021-10-12 RX ADMIN — Medication 9.45 MICROGRAM(S)/KG/MIN: at 07:36

## 2021-10-12 RX ADMIN — Medication 650 MILLIGRAM(S): at 00:45

## 2021-10-12 RX ADMIN — AMIODARONE HYDROCHLORIDE 400 MILLIGRAM(S): 400 TABLET ORAL at 05:45

## 2021-10-12 RX ADMIN — HYDROMORPHONE HYDROCHLORIDE 0.5 MILLIGRAM(S): 2 INJECTION INTRAMUSCULAR; INTRAVENOUS; SUBCUTANEOUS at 10:50

## 2021-10-12 RX ADMIN — HYDROMORPHONE HYDROCHLORIDE 0.5 MILLIGRAM(S): 2 INJECTION INTRAMUSCULAR; INTRAVENOUS; SUBCUTANEOUS at 18:45

## 2021-10-12 RX ADMIN — CHLORHEXIDINE GLUCONATE 5 MILLILITER(S): 213 SOLUTION TOPICAL at 17:37

## 2021-10-12 RX ADMIN — HYDROMORPHONE HYDROCHLORIDE 0.5 MILLIGRAM(S): 2 INJECTION INTRAMUSCULAR; INTRAVENOUS; SUBCUTANEOUS at 20:42

## 2021-10-12 RX ADMIN — GABAPENTIN 100 MILLIGRAM(S): 400 CAPSULE ORAL at 13:35

## 2021-10-12 RX ADMIN — Medication 650 MILLIGRAM(S): at 05:45

## 2021-10-12 RX ADMIN — GABAPENTIN 100 MILLIGRAM(S): 400 CAPSULE ORAL at 05:45

## 2021-10-12 RX ADMIN — Medication 14.2 MICROGRAM(S)/KG/MIN: at 21:09

## 2021-10-12 RX ADMIN — CHLORHEXIDINE GLUCONATE 1 APPLICATION(S): 213 SOLUTION TOPICAL at 12:05

## 2021-10-12 RX ADMIN — Medication 650 MILLIGRAM(S): at 13:35

## 2021-10-12 RX ADMIN — MUPIROCIN 1 APPLICATION(S): 20 OINTMENT TOPICAL at 05:53

## 2021-10-12 RX ADMIN — Medication 100 MILLIGRAM(S): at 07:47

## 2021-10-12 RX ADMIN — INSULIN HUMAN 10 UNIT(S): 100 INJECTION, SOLUTION SUBCUTANEOUS at 18:44

## 2021-10-12 RX ADMIN — Medication 50 MILLILITER(S): at 18:44

## 2021-10-12 RX ADMIN — Medication 100 MILLIGRAM(S): at 16:53

## 2021-10-12 RX ADMIN — SENNA PLUS 2 TABLET(S): 8.6 TABLET ORAL at 21:07

## 2021-10-12 RX ADMIN — Medication 650 MILLIGRAM(S): at 17:52

## 2021-10-12 RX ADMIN — INSULIN HUMAN 3 UNIT(S)/HR: 100 INJECTION, SOLUTION SUBCUTANEOUS at 21:08

## 2021-10-12 RX ADMIN — Medication 15 MILLIGRAM(S): at 09:47

## 2021-10-12 RX ADMIN — VASOPRESSIN 4 UNIT(S)/MIN: 20 INJECTION INTRAVENOUS at 07:36

## 2021-10-12 RX ADMIN — INSULIN HUMAN 3 UNIT(S)/HR: 100 INJECTION, SOLUTION SUBCUTANEOUS at 07:37

## 2021-10-12 RX ADMIN — GABAPENTIN 100 MILLIGRAM(S): 400 CAPSULE ORAL at 21:07

## 2021-10-12 RX ADMIN — Medication 650 MILLIGRAM(S): at 23:54

## 2021-10-12 RX ADMIN — HYDROMORPHONE HYDROCHLORIDE 0.5 MILLIGRAM(S): 2 INJECTION INTRAMUSCULAR; INTRAVENOUS; SUBCUTANEOUS at 17:37

## 2021-10-12 RX ADMIN — HYDROMORPHONE HYDROCHLORIDE 0.5 MILLIGRAM(S): 2 INJECTION INTRAMUSCULAR; INTRAVENOUS; SUBCUTANEOUS at 07:13

## 2021-10-12 RX ADMIN — HYDROMORPHONE HYDROCHLORIDE 0.5 MILLIGRAM(S): 2 INJECTION INTRAMUSCULAR; INTRAVENOUS; SUBCUTANEOUS at 11:48

## 2021-10-12 RX ADMIN — CHLORHEXIDINE GLUCONATE 5 MILLILITER(S): 213 SOLUTION TOPICAL at 05:53

## 2021-10-12 RX ADMIN — FENTANYL CITRATE 25 MICROGRAM(S): 50 INJECTION INTRAVENOUS at 05:45

## 2021-10-12 RX ADMIN — Medication 100 MILLIGRAM(S): at 23:37

## 2021-10-12 RX ADMIN — Medication 500 MILLIGRAM(S): at 05:45

## 2021-10-12 RX ADMIN — Medication 650 MILLIGRAM(S): at 06:00

## 2021-10-12 RX ADMIN — Medication 20 MILLIGRAM(S): at 21:57

## 2021-10-12 RX ADMIN — HYDROMORPHONE HYDROCHLORIDE 0.5 MILLIGRAM(S): 2 INJECTION INTRAMUSCULAR; INTRAVENOUS; SUBCUTANEOUS at 06:54

## 2021-10-12 RX ADMIN — Medication 650 MILLIGRAM(S): at 00:38

## 2021-10-12 RX ADMIN — POLYETHYLENE GLYCOL 3350 17 GRAM(S): 17 POWDER, FOR SOLUTION ORAL at 14:49

## 2021-10-12 RX ADMIN — HYDROMORPHONE HYDROCHLORIDE 0.5 MILLIGRAM(S): 2 INJECTION INTRAMUSCULAR; INTRAVENOUS; SUBCUTANEOUS at 20:27

## 2021-10-12 RX ADMIN — MUPIROCIN 1 APPLICATION(S): 20 OINTMENT TOPICAL at 18:01

## 2021-10-12 RX ADMIN — FENTANYL CITRATE 25 MICROGRAM(S): 50 INJECTION INTRAVENOUS at 06:00

## 2021-10-12 RX ADMIN — Medication 15 MILLIGRAM(S): at 11:48

## 2021-10-12 RX ADMIN — Medication 20 MILLIGRAM(S): at 14:50

## 2021-10-12 RX ADMIN — Medication 650 MILLIGRAM(S): at 14:22

## 2021-10-12 RX ADMIN — Medication 14.2 MICROGRAM(S)/KG/MIN: at 07:36

## 2021-10-12 NOTE — PHYSICAL THERAPY INITIAL EVALUATION ADULT - GENERAL OBSERVATIONS, REHAB EVAL
Pt received in chair, +cardiac monitoring, +pressor support +[place] +ext pacemaker +pascual +chest tube. Pt is Pashto speaking,  services utilized for session ID#[id number], sister available to give history.

## 2021-10-12 NOTE — PROGRESS NOTE ADULT - SUBJECTIVE AND OBJECTIVE BOX
EDISON ROBLES  MRN-[id number]  Patient is a 71y old  Male who presents with a chief complaint of SOB/Open Heart Surg eval (12 Oct 2021 07:24)    HPI:  Patient is a 70M PMH HTN, CAD s/p CABG in 2009, Complete Heart Block s/p PPM placement, Paroxysmal Afib on coumadin, Aortic stenosis s/p Mechanical AVR who admitted to OSH with acute heart failure exacerbation/sob/LAU.  Patient states he has to stop and catch his breath when walking up stairs. On echo to have low normal EF 50-55%, Mild reduced RV fx, mod-severe MR and severe TR. Transferred to [facility] for open heart surgery with Dr. Hua. Patient endorses shortness of breath and LAU, denies chest pain, abdominal pain, fevers, chills, nausea.  (05 Oct 2021 23:42)      Surgery/Hospital course:  10/11 MVR, TVR, LAAL, endocardial lead extraction, epicardial lead placement     Today/Overnight:    Vital Signs Last 24 Hrs  T(C): 35.9 (12 Oct 2021 16:00), Max: 36.5 (12 Oct 2021 04:00)  T(F): 96.6 (12 Oct 2021 16:00), Max: 97.7 (12 Oct 2021 04:00)  HR: 102 (12 Oct 2021 19:00) (68 - 104)  BP: 91/58 (11 Oct 2021 22:30) (91/58 - 91/58)  BP(mean): 71 (11 Oct 2021 22:30) (71 - 71)  RR: 16 (12 Oct 2021 19:00) (12 - 45)  SpO2: 90% (12 Oct 2021 19:00) (84% - 100%)  ============================I/O===========================  I&O's Detail    11 Oct 2021 07:01  -  12 Oct 2021 07:00  --------------------------------------------------------  IN:    Albumin 5%  - 250 mL: 1250 mL    DOBUTamine: 161.5 mL    Insulin: 37 mL    IV PiggyBack: 450 mL    Lactated Ringers Bolus: 750 mL    Norepinephrine: 73 mL    PRBCs (Packed Red Blood Cells): 300 mL    sodium chloride 0.9%: 170 mL    Vasopressin: 63 mL  Total IN: 3254.5 mL    OUT:    Chest Tube (mL): 440 mL    Chest Tube (mL): 510 mL    Indwelling Catheter - Urethral (mL): 1055 mL  Total OUT: 2005 mL    Total NET: 1249.5 mL      12 Oct 2021 07:01  -  12 Oct 2021 19:25  --------------------------------------------------------  IN:    Albumin 5%  - 250 mL: 250 mL    DOBUTamine: 114 mL    Heparin: 37.8 mL    Insulin: 19 mL    IV PiggyBack: 100 mL    Milrinone: 14.2 mL    Norepinephrine: 66.1 mL    PRBCs (Packed Red Blood Cells): 600 mL    sodium chloride 0.9%: 120 mL    Vasopressin: 58 mL  Total IN: 1379.1 mL    OUT:    Chest Tube (mL): 120 mL    Chest Tube (mL): 330 mL    Indwelling Catheter - Urethral (mL): 360 mL  Total OUT: 810 mL    Total NET: 569.1 mL        ============================ LABS =========================                        7.6    18.29 )-----------( 81       ( 12 Oct 2021 16:24 )             23.3     10-12    135  |  102  |  34<H>  ----------------------------<  130<H>  5.4<H>   |  21<L>  |  1.40<H>    Ca    8.6      12 Oct 2021 16:24  Phos  4.7     10-12  Mg     3.0     10-12    TPro  5.2<L>  /  Alb  3.6  /  TBili  1.2  /  DBili  x   /  AST  61<H>  /  ALT  17  /  AlkPhos  72  10-12    LIVER FUNCTIONS - ( 12 Oct 2021 00:48 )  Alb: 3.6 g/dL / Pro: 5.2 g/dL / ALK PHOS: 72 U/L / ALT: 17 U/L / AST: 61 U/L / GGT: x           PT/INR - ( 12 Oct 2021 00:48 )   PT: 14.8 sec;   INR: 1.25 ratio         PTT - ( 12 Oct 2021 18:17 )  PTT:51.8 sec  ABG - ( 12 Oct 2021 18:19 )  pH, Arterial: 7.39  pH, Blood: x     /  pCO2: 37    /  pO2: 73    / HCO3: 22    / Base Excess: -2.3  /  SaO2: 97.7                ======================Micro/Rad/Cardio=================  CXR: Reviewed  Echo: Reviewed  ======================================================  PAST MEDICAL & SURGICAL HISTORY:  HTN (hypertension)    Complete heart block    CAD (coronary artery disease)    Paroxysmal atrial fibrillation    History of mechanical aortic valve replacement    History of pacemaker    S/P CABG (coronary artery bypass graft)      ========================ASSESSMENT ================  MR/TR, afib s/p MVR, TVR, LAAL, endocardial lead extraction, epicardial lead placement on 10/11   Hx of tachy-arely syndrome, 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 650 milliGRAM(s) Oral every 6 hours  gabapentin 100 milliGRAM(s) Oral every 8 hours  HYDROmorphone  Injectable 0.5 milliGRAM(s) IV Push every 6 hours PRN Severe Pain (7 - 10)  oxyCODONE    IR 5 milliGRAM(s) Oral every 4 hours PRN Moderate Pain (4 - 6)  oxyCODONE    IR 10 milliGRAM(s) Oral every 4 hours PRN Severe Pain (7 - 10)    ==================== RESPIRATORY======================  Supplemental O2 via CPAP  Encourage incentive spirometry, continue pulse ox monitoring, follow ABGs     Mechanical Ventilation:  Mode: CPAP with PS  FiO2: 40  PEEP: 5  PS: 5  MAP: 8  PIP: 10      ====================CARDIOVASCULAR==================  MR/TR, Afib s/p MVR, TVR, LAAL, endocardial lead extraction, epicardial lead placement on 10/11  Prior PPM in place, wires cut; Temporary back up pacing wires in place   Lactate peaked to 3.6 this AM, s/p 2U pRBCs, now downtrending to 3.1, continue to monitor   Continue invasive hemodynamic monitoring   Pressor support with IV Levophed and IV Vasopressin, wean as tolerated   Maintain inotropic support with IV Dobutamine   Afib prophylaxis with Amiodarone     aMIOdarone    Tablet 400 milliGRAM(s) Oral two times a day  DOBUTamine Infusion 5 MICROgram(s)/kG/Min (9.45 mL/Hr) IV Continuous <Continuous>  milrinone Infusion 0.375 MICROgram(s)/kG/Min (7.09 mL/Hr) IV Continuous <Continuous>  norepinephrine Infusion 0.12 MICROgram(s)/kG/Min (14.2 mL/Hr) IV Continuous <Continuous>  vasopressin Infusion 0.067 Unit(s)/Min (4 mL/Hr) IV Continuous <Continuous>  atorvastatin 40 milliGRAM(s) Oral at bedtime  aspirin enteric coated 81 milliGRAM(s) Oral daily    ===================HEMATOLOGIC/ONC ===================  Thrombocytopenia and acute blood loss anemia in setting of moderate post op mediastinal bleeding requiring Amicar post op   Monitor H&H/Plts    Hep gtt for AC for afib and valves    heparin  Infusion 630 Unit(s)/Hr (6.3 mL/Hr) IV Continuous <Continuous>    ===================== RENAL =========================  Continue monitoring I&Os, BUN/Cr, and urine output   Replete lytes PRN. Keep K> 4 and Mg >2     ==================== GASTROINTESTINAL===================  Tolerating a consistent carb diet   Bowel regimen with senna     ascorbic acid 500 milliGRAM(s) Oral two times a day  pantoprazole  Injectable 40 milliGRAM(s) IV Push daily  GI prophylaxis, polyethylene glycol 3350 17 Gram(s) Oral daily  senna 2 Tablet(s) Oral at bedtime  sodium chloride 0.9%. 1000 milliLiter(s) (10 mL/Hr) IV Continuous <Continuous>    =======================    ENDOCRINE  =====================  Stress hyperglycemia, continue glucose control with IV insulin drip     dextrose 50% Injectable 50 milliLiter(s) IV Push every 15 minutes  dextrose 50% Injectable 25 milliLiter(s) IV Push every 15 minutes  insulin regular Infusion 3 Unit(s)/Hr (3 mL/Hr) IV Continuous <Continuous>    ========================INFECTIOUS DISEASE================  Afebrile, WBC downtrending 18.29->15.14   Continue trending WBC and monitoring fever curve   Perioperative coverage with Cefuroxime     cefuroxime  IVPB 1500 milliGRAM(s) IV Intermittent every 8 hours      Patient requires continuous monitoring with bedside rhythm monitoring, pulse oximetry monitoring, and continuous central venous and arterial pressure monitoring; and intermittent blood gas analysis.  Care plan discussed with ICU care team.    Patient remained critical, at risk for life threatening decompensation.   I have spent 35 minutes providing acute care with multiple re-evaluations throughout the evening.     By signing my name below, I, Yajaira Becerril, attest that this documentation has been prepared under the direction and in the presence of SARITA Chacon.  Electronically signed: Bill Moya    I, Wilfred Ng, personally performed the services described in this documentation. All medical record entries made by the scribe were at my direction and in my presence. I have reviewed the chart and agree that the record reflects my personal performance and is accurate and complete  Electronically signed: SARITA Chacon, 10-12-21 @ 19:25       EDISON ROBLES  MRN-[id number]  Patient is a 71y old  Male who presents with a chief complaint of SOB/Open Heart Surg eval (12 Oct 2021 07:24)    HPI:  Patient is a 70M PMH HTN, CAD s/p CABG in 2009, Complete Heart Block s/p PPM placement, Paroxysmal Afib on coumadin, Aortic stenosis s/p Mechanical AVR who admitted to OSH with acute heart failure exacerbation/sob/LAU.  Patient states he has to stop and catch his breath when walking up stairs. On echo to have low normal EF 50-55%, Mild reduced RV fx, mod-severe MR and severe TR. Transferred to [facility] for open heart surgery with Dr. Hua. Patient endorses shortness of breath and LAU, denies chest pain, abdominal pain, fevers, chills, nausea.  (05 Oct 2021 23:42)      Surgery/Hospital course:  10/11 MVR, TVR, LAAL, endocardial lead extraction, epicardial lead placement     Today/Overnight: Elevated lactate throughout the day, started on Dobutamine and Primacor added this PM for synergy. Rising BUN & Creatnine, started on Bumex gtt overnight. Continue to monitor electrolytes. Heparin gtt started for mechanical and tissue valves. BiPAP needed overnight.     Vital Signs Last 24 Hrs  T(C): 35.9 (12 Oct 2021 16:00), Max: 36.5 (12 Oct 2021 04:00)  T(F): 96.6 (12 Oct 2021 16:00), Max: 97.7 (12 Oct 2021 04:00)  HR: 102 (12 Oct 2021 19:00) (68 - 104)  BP: 91/58 (11 Oct 2021 22:30) (91/58 - 91/58)  BP(mean): 71 (11 Oct 2021 22:30) (71 - 71)  RR: 16 (12 Oct 2021 19:00) (12 - 45)  SpO2: 90% (12 Oct 2021 19:00) (84% - 100%)  ============================I/O===========================  I&O's Detail    11 Oct 2021 07:01  -  12 Oct 2021 07:00  --------------------------------------------------------  IN:    Albumin 5%  - 250 mL: 1250 mL    DOBUTamine: 161.5 mL    Insulin: 37 mL    IV PiggyBack: 450 mL    Lactated Ringers Bolus: 750 mL    Norepinephrine: 73 mL    PRBCs (Packed Red Blood Cells): 300 mL    sodium chloride 0.9%: 170 mL    Vasopressin: 63 mL  Total IN: 3254.5 mL    OUT:    Chest Tube (mL): 440 mL    Chest Tube (mL): 510 mL    Indwelling Catheter - Urethral (mL): 1055 mL  Total OUT: 2005 mL    Total NET: 1249.5 mL      12 Oct 2021 07:01  -  12 Oct 2021 19:25  --------------------------------------------------------  IN:    Albumin 5%  - 250 mL: 250 mL    DOBUTamine: 114 mL    Heparin: 37.8 mL    Insulin: 19 mL    IV PiggyBack: 100 mL    Milrinone: 14.2 mL    Norepinephrine: 66.1 mL    PRBCs (Packed Red Blood Cells): 600 mL    sodium chloride 0.9%: 120 mL    Vasopressin: 58 mL  Total IN: 1379.1 mL    OUT:    Chest Tube (mL): 120 mL    Chest Tube (mL): 330 mL    Indwelling Catheter - Urethral (mL): 360 mL  Total OUT: 810 mL    Total NET: 569.1 mL    ============================ LABS =========================                        7.6    18.29 )-----------( 81       ( 12 Oct 2021 16:24 )             23.3     10-12    135  |  102  |  34<H>  ----------------------------<  130<H>  5.4<H>   |  21<L>  |  1.40<H>    Ca    8.6      12 Oct 2021 16:24  Phos  4.7     10-12  Mg     3.0     10-12    TPro  5.2<L>  /  Alb  3.6  /  TBili  1.2  /  DBili  x   /  AST  61<H>  /  ALT  17  /  AlkPhos  72  10-12    LIVER FUNCTIONS - ( 12 Oct 2021 00:48 )  Alb: 3.6 g/dL / Pro: 5.2 g/dL / ALK PHOS: 72 U/L / ALT: 17 U/L / AST: 61 U/L / GGT: x           PT/INR - ( 12 Oct 2021 00:48 )   PT: 14.8 sec;   INR: 1.25 ratio         PTT - ( 12 Oct 2021 18:17 )  PTT:51.8 sec  ABG - ( 12 Oct 2021 18:19 )  pH, Arterial: 7.39  pH, Blood: x     /  pCO2: 37    /  pO2: 73    / HCO3: 22    / Base Excess: -2.3  /  SaO2: 97.7      ======================Micro/Rad/Cardio=================  CXR: Reviewed  Echo: Reviewed  ======================================================  PAST MEDICAL & SURGICAL HISTORY:  HTN (hypertension)    Complete heart block    CAD (coronary artery disease)    Paroxysmal atrial fibrillation    History of mechanical aortic valve replacement    History of pacemaker    S/P CABG (coronary artery bypass graft)      ========================ASSESSMENT ================  MR/TR, afib s/p MVR, TVR, LAAL, endocardial lead extraction, epicardial lead placement on 10/11   Hx of tachy-arely syndrome, 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 650 milliGRAM(s) Oral every 6 hours  gabapentin 100 milliGRAM(s) Oral every 8 hours  HYDROmorphone  Injectable 0.5 milliGRAM(s) IV Push every 6 hours PRN Severe Pain (7 - 10)  oxyCODONE    IR 5 milliGRAM(s) Oral every 4 hours PRN Moderate Pain (4 - 6)  oxyCODONE    IR 10 milliGRAM(s) Oral every 4 hours PRN Severe Pain (7 - 10)    ==================== RESPIRATORY======================  Supplemental O2 via CPAP  Encourage incentive spirometry, continue pulse ox monitoring, follow ABGs     Mechanical Ventilation:  Mode: CPAP with PS  FiO2: 40  PEEP: 5  PS: 5  MAP: 8  PIP: 10      ====================CARDIOVASCULAR==================  MR/TR, Afib s/p MVR, TVR, LAAL, endocardial lead extraction, epicardial lead placement on 10/11  Prior PPM in place, wires cut; Temporary back up pacing wires in place   Lactate peaked to 3.6 this AM, s/p 2U pRBCs, now downtrending to 3.1, continue to monitor   Continue invasive hemodynamic monitoring   Pressor support with IV Levophed and IV Vasopressin, wean as tolerated   Maintain inotropic support with IV Dobutamine & prmacor   Afib prophylaxis with Amiodarone     aMIOdarone    Tablet 400 milliGRAM(s) Oral two times a day  DOBUTamine Infusion 5 MICROgram(s)/kG/Min (9.45 mL/Hr) IV Continuous <Continuous>  milrinone Infusion 0.375 MICROgram(s)/kG/Min (7.09 mL/Hr) IV Continuous <Continuous>  norepinephrine Infusion 0.12 MICROgram(s)/kG/Min (14.2 mL/Hr) IV Continuous <Continuous>  vasopressin Infusion 0.067 Unit(s)/Min (4 mL/Hr) IV Continuous <Continuous>  atorvastatin 40 milliGRAM(s) Oral at bedtime  aspirin enteric coated 81 milliGRAM(s) Oral daily    ===================HEMATOLOGIC/ONC ===================  Thrombocytopenia and acute blood loss anemia in setting of moderate post op mediastinal bleeding requiring Amicar post op   Monitor H&H/Plts    Hep gtt for AC for afib and valves    heparin  Infusion 630 Unit(s)/Hr (6.3 mL/Hr) IV Continuous <Continuous>    ===================== RENAL =========================  Continue monitoring I&Os, BUN/Cr, and urine output   Replete lytes PRN. Keep K> 4 and Mg >2     ==================== GASTROINTESTINAL===================  Tolerating a consistent carb diet   Bowel regimen with senna     ascorbic acid 500 milliGRAM(s) Oral two times a day  pantoprazole  Injectable 40 milliGRAM(s) IV Push daily  GI prophylaxis, polyethylene glycol 3350 17 Gram(s) Oral daily  senna 2 Tablet(s) Oral at bedtime  sodium chloride 0.9%. 1000 milliLiter(s) (10 mL/Hr) IV Continuous <Continuous>    =======================    ENDOCRINE  =====================  Stress hyperglycemia, continue glucose control with IV insulin drip     dextrose 50% Injectable 50 milliLiter(s) IV Push every 15 minutes  dextrose 50% Injectable 25 milliLiter(s) IV Push every 15 minutes  insulin regular Infusion 3 Unit(s)/Hr (3 mL/Hr) IV Continuous <Continuous>    ========================INFECTIOUS DISEASE================  Afebrile, WBC downtrending 18.29->15.14   Continue trending WBC and monitoring fever curve   Perioperative coverage with Cefuroxime     cefuroxime  IVPB 1500 milliGRAM(s) IV Intermittent every 8 hours      Patient requires continuous monitoring with bedside rhythm monitoring, pulse oximetry monitoring, and continuous central venous and arterial pressure monitoring; and intermittent blood gas analysis.  Care plan discussed with ICU care team.    Patient remained critical, at risk for life threatening decompensation.   I have spent 35 minutes providing acute care with multiple re-evaluations throughout the evening.     By signing my name below, I, Yajaira Becerril, attest that this documentation has been prepared under the direction and in the presence of SARITA Chacon.  Electronically signed: Bill Moya, Wilfred Ng, personally performed the services described in this documentation. All medical record entries made by the scribe were at my direction and in my presence. I have reviewed the chart and agree that the record reflects my personal performance and is accurate and complete  Electronically signed: SARITA Chacon, 10-12-21 @ 19:25

## 2021-10-12 NOTE — PHYSICAL THERAPY INITIAL EVALUATION ADULT - ADDITIONAL COMMENTS
Pt's sister available for translation with social hx and provides collateral information. Pt lives in private home alone, 11 steps to main living level +handrail. PTA pt was Ind with all functional mobility and ADLs, no devices required. Per sister, pt's son visits on a weekly basis and would be available to assist pt as needed. Per CM note, pt is undocumented, from [place], living in [place] for past 15 years. +works

## 2021-10-12 NOTE — PHYSICAL THERAPY INITIAL EVALUATION ADULT - LEVEL OF INDEPENDENCE: GAIT, REHAB EVAL
TBA - pt takes 20 steps in place with EDITA MARTINS and RW contact guard/minimum assist (75% patients effort)

## 2021-10-12 NOTE — PHYSICAL THERAPY INITIAL EVALUATION ADULT - PLANNED THERAPY INTERVENTIONS, PT EVAL
GOAL: Pt will negotiate 11 steps independently within 2 weeks./balance training/bed mobility training/gait training/transfer training

## 2021-10-12 NOTE — PHYSICAL THERAPY INITIAL EVALUATION ADULT - PERTINENT HX OF CURRENT PROBLEM, REHAB EVAL
70M PMH HTN, CAD s/p CABG in 2009, Complete Heart Block s/p PPM placement, Paroxysmal Afib on coumadin, Aortic stenosis s/p Mechanical AVR who admitted to OSH with acute heart failure exacerbation/sob/LAU.  Patient states he has to stop and catch his breath when walking up stairs. On echo to have low normal EF 50-55%, Mild reduced RV fx, mod-severe MR and severe TR. Transferred to [facility] for open heart surgery with Dr. Hua. Now s/p MVR, TVR, LAAL, Epicardial Lead Placement 10/11/21.

## 2021-10-12 NOTE — PHYSICAL THERAPY INITIAL EVALUATION ADULT - DID THE PATIENT HAVE SURGERY?
MVR, TVR, LAAL, Epicardial Lead Placement/yes MVR, TVR, LAAL, Epicardial Lead Placement, repeat sternotomy for pericardial effusion and cardiac tamponade 10/13/yes

## 2021-10-12 NOTE — PHYSICAL THERAPY INITIAL EVALUATION ADULT - CRITERIA FOR SKILLED THERAPEUTIC INTERVENTIONS
impairments found/functional limitations in following categories/risk reduction/prevention/rehab potential/predicted duration of therapy intervention/anticipated equipment needs at discharge/anticipated discharge recommendation

## 2021-10-12 NOTE — PROGRESS NOTE ADULT - SUBJECTIVE AND OBJECTIVE BOX
EDISON ROBLES  MRN-[id number]  Patient is a 71y old  Male who presents with a chief complaint of SOB/Open Heart Surg eval (11 Oct 2021 17:55)    HPI:  Patient is a 70M PMH HTN, CAD s/p CABG in 2009, Complete Heart Block s/p PPM placement, Paroxysmal Afib on coumadin, Aortic stenosis s/p Mechanical AVR who admitted to OSH with acute heart failure exacerbation/sob/LAU.  Patient states he has to stop and catch his breath when walking up stairs. On echo to have low normal EF 50-55%, Mild reduced RV fx, mod-severe MR and severe TR. Transferred to [facility] for open heart surgery with Dr. Hua. Patient endorses shortness of breath and LAU, denies chest pain, abdominal pain, fevers, chills, nausea.  (05 Oct 2021 23:42)      Surgery/Hospital Course:  10/11 MVR, TVR, LAAL, endocardial lead extraction, epicardial lead placement     Today:  Extubated overnight, now on supplemental O2 via NC.     REVIEW OF SYSTEMS:  Gen: No fever  EYES/ENT: No visual changes;  No vertigo or throat pain   NECK: No pain   RES:  No shortness of breath or Cough  CARD: No chest pain   GI: No abdominal pain  : No dysuria  NEURO: No weakness  SKIN: No itching, rashes     ICU Vital Signs Last 24 Hrs  T(C): 36.5 (12 Oct 2021 04:00), Max: 36.5 (12 Oct 2021 04:00)  T(F): 97.7 (12 Oct 2021 04:00), Max: 97.7 (12 Oct 2021 04:00)  HR: 77 (12 Oct 2021 06:45) (68 - 104)  BP: 91/58 (11 Oct 2021 22:30) (91/58 - 91/58)  BP(mean): 71 (11 Oct 2021 22:30) (71 - 71)  ABP: 104/43 (12 Oct 2021 06:45) (-3/-3 - 123/46)  ABP(mean): 63 (12 Oct 2021 06:45) (-3 - 76)  RR: 15 (12 Oct 2021 06:45) (12 - 29)  SpO2: 92% (12 Oct 2021 06:45) (84% - 100%)      Physical Exam:  Gen:  NAD   CNS: non focal 	  Neck: no JVD  RES : clear , no wheezing              CVS: Regular  rhythm. Normal S1/S2  Chest: +chest tubes   Abd: Soft, non-distended. Bowel sounds present.  Skin: No rash.  Ext:  no edema    ============================I/O===========================   I&O's Detail    11 Oct 2021 07:01  -  12 Oct 2021 07:00  --------------------------------------------------------  IN:    Albumin 5%  - 250 mL: 1250 mL    DOBUTamine: 161.5 mL    Insulin: 37 mL    IV PiggyBack: 450 mL    Lactated Ringers Bolus: 750 mL    Norepinephrine: 73 mL    PRBCs (Packed Red Blood Cells): 300 mL    sodium chloride 0.9%: 170 mL    Vasopressin: 63 mL  Total IN: 3254.5 mL    OUT:    Chest Tube (mL): 440 mL    Chest Tube (mL): 510 mL    Indwelling Catheter - Urethral (mL): 1055 mL  Total OUT: 2005 mL    Total NET: 1249.5 mL        ============================ LABS =========================                        8.5    15.14 )-----------( 97       ( 12 Oct 2021 00:48 )             26.2     10-12    144  |  106  |  29<H>  ----------------------------<  136<H>  4.8   |  23  |  1.30    Ca    8.6      12 Oct 2021 00:48  Phos  4.7     10-12  Mg     3.0     10-12    TPro  5.2<L>  /  Alb  3.6  /  TBili  1.2  /  DBili  x   /  AST  61<H>  /  ALT  17  /  AlkPhos  72  10-12    LIVER FUNCTIONS - ( 12 Oct 2021 00:48 )  Alb: 3.6 g/dL / Pro: 5.2 g/dL / ALK PHOS: 72 U/L / ALT: 17 U/L / AST: 61 U/L / GGT: x           PT/INR - ( 12 Oct 2021 00:48 )   PT: 14.8 sec;   INR: 1.25 ratio         PTT - ( 12 Oct 2021 00:48 )  PTT:48.1 sec  ABG - ( 12 Oct 2021 06:04 )  pH, Arterial: 7.37  pH, Blood: x     /  pCO2: 42    /  pO2: 74    / HCO3: 24    / Base Excess: -0.9  /  SaO2: 96.8                ======================Micro/Rad/Cardio=================  CXR: Reviewed  Echo:Reviewed  ======================================================  PAST MEDICAL & SURGICAL HISTORY:  HTN (hypertension)    Complete heart block    CAD (coronary artery disease)    Paroxysmal atrial fibrillation    History of mechanical aortic valve replacement    History of pacemaker    S/P CABG (coronary artery bypass graft)      ====================ASSESSMENT ==============  MR/TR, afib s/p MVR, TVR, LAAL, endocardial lead extraction, epicardial lead placement on 10/11   Hx of tachy-arely syndrome, 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 650 milliGRAM(s) Oral every 6 hours  gabapentin 100 milliGRAM(s) Oral every 8 hours  HYDROmorphone  Injectable 0.5 milliGRAM(s) IV Push every 6 hours PRN Severe Pain (7 - 10)  oxyCODONE    IR 5 milliGRAM(s) Oral every 4 hours PRN Moderate Pain (4 - 6)  oxyCODONE    IR 10 milliGRAM(s) Oral every 4 hours PRN Severe Pain (7 - 10)    ==================== RESPIRATORY======================  Extubated overnight, now on supplemental O2 via NC.   Encourage incentive spirometry, continue pulse ox monitoring, follow ABGs     ====================CARDIOVASCULAR==================  MR/TR, Afib s/p MVR, TVR, LAAL, endocardial lead extraction, epicardial lead placement on 10/11  Continue invasive hemodynamic monitoring   Lactate peaked to 4.3 on 10/11, now 2.7, continue trending   Pressor support with IV Levophed and IV Vasopressin   Inotropic support with IV Dobutamine   Afib prophylaxis with Amiodarone   Back up pacing wires in place     aMIOdarone    Tablet 400 milliGRAM(s) Oral two times a day  DOBUTamine Infusion 5 MICROgram(s)/kG/Min (9.45 mL/Hr) IV Continuous <Continuous>  norepinephrine Infusion 0.12 MICROgram(s)/kG/Min (14.2 mL/Hr) IV Continuous <Continuous>  vasopressin Infusion 0.067 Unit(s)/Min (4 mL/Hr) IV Continuous <Continuous>    ===================HEMATOLOGIC/ONC ===================  Thrombocytopenia and acute blood loss anemia in setting of moderate post op mediastinal bleeding   Monitor H&H/Plts      ===================== RENAL =========================  Continue monitoring urine output, I&OS, BUN/Cr   Replete lytes PRN. Keep K> 4 and Mg >2    ==================== GASTROINTESTINAL===================  NPO, will advance patient diet as tolerated   Bowel regimen with Miralax and Senna     ascorbic acid 500 milliGRAM(s) Oral two times a day  GI prophylaxis, pantoprazole  Injectable 40 milliGRAM(s) IV Push daily  polyethylene glycol 3350 17 Gram(s) Oral daily  potassium chloride  10 mEq/50 mL IVPB 10 milliEquivalent(s) IV Intermittent every 1 hour  senna 2 Tablet(s) Oral at bedtime  sodium chloride 0.9%. 1000 milliLiter(s) (10 mL/Hr) IV Continuous <Continuous>    =======================    ENDOCRINE  =====================  Stress hyperglycemia, continue glucose control with IV insulin drip     dextrose 50% Injectable 25 milliLiter(s) IV Push every 15 minutes  insulin regular Infusion 3 Unit(s)/Hr (3 mL/Hr) IV Continuous <Continuous>    ========================INFECTIOUS DISEASE================  Afebrile, WBC downtrending 18.29->15.14   Continue trending WBC and monitoring fever curve   Perioperative coverage with Cefuroxime     cefuroxime  IVPB 1500 milliGRAM(s) IV Intermittent every 8 hours          Patient requires continuous monitoring with bedside rhythm monitoring, pulse ox monitoring, and intermittent blood gas analysis. Care plan discussed with ICU care team. Patient remained critical and at risk for life threatening decompensation.     By signing my name below, I, Caitlin Virk, attest that this documentation has been prepared under the direction and in the presence of SARITA Nolasco   Electronically signed: Miles Lainez, 10-12-21 @ 07:25    I, Kalani Blount, personally performed the services described in this documentation. all medical record entries made by the miles were at my direction and in my presence. I have reviewed the chart and agree that the record reflects my personal performance and is accurate and complete  Electronically signed: SARITA Nolasco        EDISON ROBLES  MRN-[id number]  Patient is a 71y old  Male who presents with a chief complaint of SOB/Open Heart Surg eval (11 Oct 2021 17:55)    HPI:  Patient is a 70M PMH HTN, CAD s/p CABG in 2009, Complete Heart Block s/p PPM placement, Paroxysmal Afib on coumadin, Aortic stenosis s/p Mechanical AVR who admitted to OSH with acute heart failure exacerbation/sob/LAU.  Patient states he has to stop and catch his breath when walking up stairs. On echo to have low normal EF 50-55%, Mild reduced RV fx, mod-severe MR and severe TR. Transferred to [facility] for [facility] heart surgery with Dr. Hua. Patient endorses shortness of breath and LAU, denies chest pain, abdominal pain, fevers, chills, nausea.  (05 Oct 2021 23:42)      Surgery/Hospital Course:  10/11 MVR, TVR, LAAL, endocardial lead extraction, epicardial lead placement     Today:  Extubated overnight, now on supplemental O2 via NC.     REVIEW OF SYSTEMS:  Gen: No fever  EYES/ENT: No visual changes;  No vertigo or throat pain   NECK: No pain   RES:  No shortness of breath or Cough  CARD: No chest pain   GI: No abdominal pain  : No dysuria  NEURO: No weakness  SKIN: No itching, rashes     ICU Vital Signs Last 24 Hrs  T(C): 36.5 (12 Oct 2021 04:00), Max: 36.5 (12 Oct 2021 04:00)  T(F): 97.7 (12 Oct 2021 04:00), Max: 97.7 (12 Oct 2021 04:00)  HR: 77 (12 Oct 2021 06:45) (68 - 104)  BP: 91/58 (11 Oct 2021 22:30) (91/58 - 91/58)  BP(mean): 71 (11 Oct 2021 22:30) (71 - 71)  ABP: 104/43 (12 Oct 2021 06:45) (-3/-3 - 123/46)  ABP(mean): 63 (12 Oct 2021 06:45) (-3 - 76)  RR: 15 (12 Oct 2021 06:45) (12 - 29)  SpO2: 92% (12 Oct 2021 06:45) (84% - 100%)      Physical Exam:  Gen:  NAD   CNS: non focal 	  Neck: no JVD  RES : clear , no wheezing              CVS: Regular  rhythm. Normal S1/S2  Chest: +chest tubes   Abd: Soft, non-distended. Bowel sounds present.  Skin: No rash.  Ext:  no edema    ============================I/O===========================   I&O's Detail    11 Oct 2021 07:01  -  12 Oct 2021 07:00  --------------------------------------------------------  IN:    Albumin 5%  - 250 mL: 1250 mL    DOBUTamine: 161.5 mL    Insulin: 37 mL    IV PiggyBack: 450 mL    Lactated Ringers Bolus: 750 mL    Norepinephrine: 73 mL    PRBCs (Packed Red Blood Cells): 300 mL    sodium chloride 0.9%: 170 mL    Vasopressin: 63 mL  Total IN: 3254.5 mL    OUT:    Chest Tube (mL): 440 mL    Chest Tube (mL): 510 mL    Indwelling Catheter - Urethral (mL): 1055 mL  Total OUT: 2005 mL    Total NET: 1249.5 mL        ============================ LABS =========================                        8.5    15.14 )-----------( 97       ( 12 Oct 2021 00:48 )             26.2     10-12    144  |  106  |  29<H>  ----------------------------<  136<H>  4.8   |  23  |  1.30    Ca    8.6      12 Oct 2021 00:48  Phos  4.7     10-12  Mg     3.0     10-12    TPro  5.2<L>  /  Alb  3.6  /  TBili  1.2  /  DBili  x   /  AST  61<H>  /  ALT  17  /  AlkPhos  72  10-12    LIVER FUNCTIONS - ( 12 Oct 2021 00:48 )  Alb: 3.6 g/dL / Pro: 5.2 g/dL / ALK PHOS: 72 U/L / ALT: 17 U/L / AST: 61 U/L / GGT: x           PT/INR - ( 12 Oct 2021 00:48 )   PT: 14.8 sec;   INR: 1.25 ratio         PTT - ( 12 Oct 2021 00:48 )  PTT:48.1 sec  ABG - ( 12 Oct 2021 06:04 )  pH, Arterial: 7.37  pH, Blood: x     /  pCO2: 42    /  pO2: 74    / HCO3: 24    / Base Excess: -0.9  /  SaO2: 96.8                ======================Micro/Rad/Cardio=================  CXR: Reviewed  Echo:Reviewed  ======================================================  PAST MEDICAL & SURGICAL HISTORY:  HTN (hypertension)    Complete heart block    CAD (coronary artery disease)    Paroxysmal atrial fibrillation    History of mechanical aortic valve replacement    History of pacemaker    S/P CABG (coronary artery bypass graft)      ====================ASSESSMENT ==============  MR/TR, afib s/p MVR, TVR, LAAL, endocardial lead extraction, epicardial lead placement on 10/11   Hx of tachy-arely syndrome, 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 650 milliGRAM(s) Oral every 6 hours  gabapentin 100 milliGRAM(s) Oral every 8 hours  HYDROmorphone  Injectable 0.5 milliGRAM(s) IV Push every 6 hours PRN Severe Pain (7 - 10)  oxyCODONE    IR 5 milliGRAM(s) Oral every 4 hours PRN Moderate Pain (4 - 6)  oxyCODONE    IR 10 milliGRAM(s) Oral every 4 hours PRN Severe Pain (7 - 10)    ==================== RESPIRATORY======================  Extubated overnight, now on supplemental O2 via NC.   Encourage incentive spirometry, continue pulse ox monitoring, follow ABGs     ====================CARDIOVASCULAR==================  MR/TR, Afib s/p MVR, TVR, LAAL, endocardial lead extraction, epicardial lead placement on 10/11  Continue invasive hemodynamic monitoring   Lactate peaked to 4.3 on 10/11, now 2.7, continue trending   Pressor support with IV Levophed and IV Vasopressin   Inotropic support with IV Dobutamine   Afib prophylaxis with Amiodarone   Back up pacing wires in place     aMIOdarone    Tablet 400 milliGRAM(s) Oral two times a day  DOBUTamine Infusion 5 MICROgram(s)/kG/Min (9.45 mL/Hr) IV Continuous <Continuous>  norepinephrine Infusion 0.12 MICROgram(s)/kG/Min (14.2 mL/Hr) IV Continuous <Continuous>  vasopressin Infusion 0.067 Unit(s)/Min (4 mL/Hr) IV Continuous <Continuous>    ===================HEMATOLOGIC/ONC ===================  Thrombocytopenia and acute blood loss anemia in setting of moderate post op mediastinal bleeding requiring Amicar post op   Monitor H&H/Plts      ===================== RENAL =========================  Continue monitoring urine output, I&OS, BUN/Cr   Replete lytes PRN. Keep K> 4 and Mg >2    ==================== GASTROINTESTINAL===================  NPO, will advance patient diet as tolerated   Bowel regimen with Miralax and Senna     ascorbic acid 500 milliGRAM(s) Oral two times a day  GI prophylaxis, pantoprazole  Injectable 40 milliGRAM(s) IV Push daily  polyethylene glycol 3350 17 Gram(s) Oral daily  potassium chloride  10 mEq/50 mL IVPB 10 milliEquivalent(s) IV Intermittent every 1 hour  senna 2 Tablet(s) Oral at bedtime  sodium chloride 0.9%. 1000 milliLiter(s) (10 mL/Hr) IV Continuous <Continuous>    =======================    ENDOCRINE  =====================  Stress hyperglycemia, continue glucose control with IV insulin drip     dextrose 50% Injectable 25 milliLiter(s) IV Push every 15 minutes  insulin regular Infusion 3 Unit(s)/Hr (3 mL/Hr) IV Continuous <Continuous>    ========================INFECTIOUS DISEASE================  Afebrile, WBC downtrending 18.29->15.14   Continue trending WBC and monitoring fever curve   Perioperative coverage with Cefuroxime     cefuroxime  IVPB 1500 milliGRAM(s) IV Intermittent every 8 hours          Patient requires continuous monitoring with bedside rhythm monitoring, pulse ox monitoring, and intermittent blood gas analysis. Care plan discussed with ICU care team. Patient remained critical and at risk for life threatening decompensation.     By signing my name below, I, Caitlin Virk, attest that this documentation has been prepared under the direction and in the presence of SARITA Nolasco   Electronically signed: Miles Lainez, 10-12-21 @ 07:25    IKalani, personally performed the services described in this documentation. all medical record entries made by the miles were at my direction and in my presence. I have reviewed the chart and agree that the record reflects my personal performance and is accurate and complete  Electronically signed: SARITA Nolasco        EDISON ROBLES  MRN-[id number]  Patient is a 71y old  Male who presents with a chief complaint of SOB/Open Heart Surg eval (11 Oct 2021 17:55)    HPI:  Patient is a 70M PMH HTN, CAD s/p CABG in 2009, Complete Heart Block s/p PPM placement, Paroxysmal Afib on coumadin, Aortic stenosis s/p Mechanical AVR who admitted to OSH with acute heart failure exacerbation/sob/LAU.  Patient states he has to stop and catch his breath when walking up stairs. On echo to have low normal EF 50-55%, Mild reduced RV fx, mod-severe MR and severe TR. Transferred to [facility] for open heart surgery with Dr. Hua. Patient endorses shortness of breath and LAU, denies chest pain, abdominal pain, fevers, chills, nausea.  (05 Oct 2021 23:42)      Surgery/Hospital Course:  10/11 MVR, TVR, LAAL, endocardial lead extraction, epicardial lead placement     Today:  Extubated overnight, initially on Aerosol Mask, escalated to HFO2 50%/50L. Lactate peaked to 3.6 this AM, s/p 2U pRBCs, now downtrending to 3.1, continue to monitor. Wean pressor support with IV Levophed and IV Vasopressin as tolerated. Maintain inotropic support with IV Dobutamine.     REVIEW OF SYSTEMS:  Gen: No fever  EYES/ENT: No visual changes;  No vertigo or throat pain   NECK: No pain   RES:  No shortness of breath or Cough  CARD: No chest pain   GI: No abdominal pain  : No dysuria  NEURO: No weakness  SKIN: No itching, rashes     ICU Vital Signs Last 24 Hrs  T(C): 36.5 (12 Oct 2021 04:00), Max: 36.5 (12 Oct 2021 04:00)  T(F): 97.7 (12 Oct 2021 04:00), Max: 97.7 (12 Oct 2021 04:00)  HR: 77 (12 Oct 2021 06:45) (68 - 104)  BP: 91/58 (11 Oct 2021 22:30) (91/58 - 91/58)  BP(mean): 71 (11 Oct 2021 22:30) (71 - 71)  ABP: 104/43 (12 Oct 2021 06:45) (-3/-3 - 123/46)  ABP(mean): 63 (12 Oct 2021 06:45) (-3 - 76)  RR: 15 (12 Oct 2021 06:45) (12 - 29)  SpO2: 92% (12 Oct 2021 06:45) (84% - 100%)      Physical Exam:  Gen:  NAD   CNS: non focal 	  Neck: no JVD  RES : clear , no wheezing              CVS: Regular  rhythm. Normal S1/S2  Chest: +chest tubes   Abd: Soft, non-distended. Bowel sounds present.  Skin: No rash.  Ext:  no edema    ============================I/O===========================   I&O's Detail    11 Oct 2021 07:01  -  12 Oct 2021 07:00  --------------------------------------------------------  IN:    Albumin 5%  - 250 mL: 1250 mL    DOBUTamine: 161.5 mL    Insulin: 37 mL    IV PiggyBack: 450 mL    Lactated Ringers Bolus: 750 mL    Norepinephrine: 73 mL    PRBCs (Packed Red Blood Cells): 300 mL    sodium chloride 0.9%: 170 mL    Vasopressin: 63 mL  Total IN: 3254.5 mL    OUT:    Chest Tube (mL): 440 mL    Chest Tube (mL): 510 mL    Indwelling Catheter - Urethral (mL): 1055 mL  Total OUT: 2005 mL    Total NET: 1249.5 mL        ============================ LABS =========================                        8.5    15.14 )-----------( 97       ( 12 Oct 2021 00:48 )             26.2     10-12    144  |  106  |  29<H>  ----------------------------<  136<H>  4.8   |  23  |  1.30    Ca    8.6      12 Oct 2021 00:48  Phos  4.7     10-12  Mg     3.0     10-12    TPro  5.2<L>  /  Alb  3.6  /  TBili  1.2  /  DBili  x   /  AST  61<H>  /  ALT  17  /  AlkPhos  72  10-12    LIVER FUNCTIONS - ( 12 Oct 2021 00:48 )  Alb: 3.6 g/dL / Pro: 5.2 g/dL / ALK PHOS: 72 U/L / ALT: 17 U/L / AST: 61 U/L / GGT: x           PT/INR - ( 12 Oct 2021 00:48 )   PT: 14.8 sec;   INR: 1.25 ratio         PTT - ( 12 Oct 2021 00:48 )  PTT:48.1 sec  ABG - ( 12 Oct 2021 06:04 )  pH, Arterial: 7.37  pH, Blood: x     /  pCO2: 42    /  pO2: 74    / HCO3: 24    / Base Excess: -0.9  /  SaO2: 96.8                ======================Micro/Rad/Cardio=================  CXR: Reviewed  Echo:Reviewed  ======================================================  PAST MEDICAL & SURGICAL HISTORY:  HTN (hypertension)    Complete heart block    CAD (coronary artery disease)    Paroxysmal atrial fibrillation    History of mechanical aortic valve replacement    History of pacemaker    S/P CABG (coronary artery bypass graft)      ====================ASSESSMENT ==============  MR/TR, afib s/p MVR, TVR, LAAL, endocardial lead extraction, epicardial lead placement on 10/11   Hx of tachy-arely syndrome, 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 650 milliGRAM(s) Oral every 6 hours  gabapentin 100 milliGRAM(s) Oral every 8 hours  HYDROmorphone  Injectable 0.5 milliGRAM(s) IV Push every 6 hours PRN Severe Pain (7 - 10)  oxyCODONE    IR 5 milliGRAM(s) Oral every 4 hours PRN Moderate Pain (4 - 6)  oxyCODONE    IR 10 milliGRAM(s) Oral every 4 hours PRN Severe Pain (7 - 10)    ==================== RESPIRATORY======================  Extubated overnight, initially on Aerosol Mask, escalated to HFO2 50%/50L   Encourage incentive spirometry, continue pulse ox monitoring, follow ABGs     ====================CARDIOVASCULAR==================  MR/TR, Afib s/p MVR, TVR, LAAL, endocardial lead extraction, epicardial lead placement on 10/11  Prior PPM in place, wires cut; Temporary back up pacing wires in place   Lactate peaked to 3.6 this AM, s/p 2U pRBCs, now downtrending to 3.1, continue to monitor   Continue invasive hemodynamic monitoring   Pressor support with IV Levophed and IV Vasopressin, wean as tolerated   Maintain inotropic support with IV Dobutamine   Afib prophylaxis with Amiodarone     aMIOdarone    Tablet 400 milliGRAM(s) Oral two times a day  DOBUTamine Infusion 5 MICROgram(s)/kG/Min (9.45 mL/Hr) IV Continuous <Continuous>  norepinephrine Infusion 0.12 MICROgram(s)/kG/Min (14.2 mL/Hr) IV Continuous <Continuous>  vasopressin Infusion 0.067 Unit(s)/Min (4 mL/Hr) IV Continuous <Continuous>    ===================HEMATOLOGIC/ONC ===================  Thrombocytopenia and acute blood loss anemia in setting of moderate post op mediastinal bleeding requiring Amicar post op   Monitor H&H/Plts      ===================== RENAL =========================  Continue monitoring urine output, I&OS, BUN/Cr   Replete lytes PRN. Keep K> 4 and Mg >2    ==================== GASTROINTESTINAL===================  NPO, will advance patient diet as tolerated   Bowel regimen with Miralax and Senna     ascorbic acid 500 milliGRAM(s) Oral two times a day  GI prophylaxis, pantoprazole  Injectable 40 milliGRAM(s) IV Push daily  polyethylene glycol 3350 17 Gram(s) Oral daily  potassium chloride  10 mEq/50 mL IVPB 10 milliEquivalent(s) IV Intermittent every 1 hour  senna 2 Tablet(s) Oral at bedtime  sodium chloride 0.9%. 1000 milliLiter(s) (10 mL/Hr) IV Continuous <Continuous>    =======================    ENDOCRINE  =====================  Stress hyperglycemia, continue glucose control with IV insulin drip     dextrose 50% Injectable 25 milliLiter(s) IV Push every 15 minutes  insulin regular Infusion 3 Unit(s)/Hr (3 mL/Hr) IV Continuous <Continuous>    ========================INFECTIOUS DISEASE================  Afebrile, WBC downtrending 18.29->15.14   Continue trending WBC and monitoring fever curve   Perioperative coverage with Cefuroxime     cefuroxime  IVPB 1500 milliGRAM(s) IV Intermittent every 8 hours          Patient requires continuous monitoring with bedside rhythm monitoring, pulse ox monitoring, and intermittent blood gas analysis. Care plan discussed with ICU care team. Patient remained critical and at risk for life threatening decompensation.     By signing my name below, I, Caitlin Virk, attest that this documentation has been prepared under the direction and in the presence of SARITA Nolasco   Electronically signed: Bill Lainez, 10-12-21 @ 07:25    I, Kalani Blount, personally performed the services described in this documentation. all medical record entries made by the [email or web address] were at my direction and in my presence. I have reviewed the chart and agree that the record reflects my personal performance and is accurate and complete  Electronically signed: SARITA Nolasco        EDISON ROBLES  MRN-[id number]  Patient is a 71y old  Male who presents with a chief complaint of SOB/Open Heart Surg eval (11 Oct 2021 17:55)    HPI:  Patient is a 70M PMH HTN, CAD s/p CABG in 2009, Complete Heart Block s/p PPM placement, Paroxysmal Afib on coumadin, Aortic stenosis s/p Mechanical AVR who admitted to OSH with acute heart failure exacerbation/sob/LAU.  Patient states he has to stop and catch his breath when walking up stairs. On echo to have low normal EF 50-55%, Mild reduced RV fx, mod-severe MR and severe TR. Transferred to [facility] for open heart surgery with Dr. Hua. Patient endorses shortness of breath and LAU, denies chest pain, abdominal pain, fevers, chills, nausea.  (05 Oct 2021 23:42)      Surgery/Hospital Course:  10/11 MVR, TVR, LAAL, endocardial lead extraction, epicardial lead placement     Today:  Extubated overnight, initially on Aerosol Mask, escalated to HFO2 50%/50L. Lactate peaked to 3.6 this AM, s/p 2U pRBCs, now downtrending to 3.1, continue to monitor. Wean pressor support with IV Levophed and IV Vasopressin as tolerated. Maintain inotropic support with IV Dobutamine.     REVIEW OF SYSTEMS:  Gen: No fever  EYES/ENT: No visual changes;  No vertigo or throat pain   NECK: No pain   RES:  No shortness of breath or Cough.   CARD: No chest pain   GI: No abdominal pain  : No dysuria  NEURO: No weakness  SKIN: No itching, rashes     ICU Vital Signs Last 24 Hrs  T(C): 36.5 (12 Oct 2021 04:00), Max: 36.5 (12 Oct 2021 04:00)  T(F): 97.7 (12 Oct 2021 04:00), Max: 97.7 (12 Oct 2021 04:00)  HR: 77 (12 Oct 2021 06:45) (68 - 104)  BP: 91/58 (11 Oct 2021 22:30) (91/58 - 91/58)  BP(mean): 71 (11 Oct 2021 22:30) (71 - 71)  ABP: 104/43 (12 Oct 2021 06:45) (-3/-3 - 123/46)  ABP(mean): 63 (12 Oct 2021 06:45) (-3 - 76)  RR: 15 (12 Oct 2021 06:45) (12 - 29)  SpO2: 92% (12 Oct 2021 06:45) (84% - 100%)      Physical Exam:  Gen:  NAD   CNS: non focal 	  Neck: no JVD  RES : clear , no wheezing              CVS: Regular  rhythm. Normal S1/S2  Chest: +chest tubes   Abd: Soft, non-distended. Bowel sounds present.  Skin: No rash.  Ext:  no edema    ============================I/O===========================   I&O's Detail    11 Oct 2021 07:01  -  12 Oct 2021 07:00  --------------------------------------------------------  IN:    Albumin 5%  - 250 mL: 1250 mL    DOBUTamine: 161.5 mL    Insulin: 37 mL    IV PiggyBack: 450 mL    Lactated Ringers Bolus: 750 mL    Norepinephrine: 73 mL    PRBCs (Packed Red Blood Cells): 300 mL    sodium chloride 0.9%: 170 mL    Vasopressin: 63 mL  Total IN: 3254.5 mL    OUT:    Chest Tube (mL): 440 mL    Chest Tube (mL): 510 mL    Indwelling Catheter - Urethral (mL): 1055 mL  Total OUT: 2005 mL    Total NET: 1249.5 mL        ============================ LABS =========================                        8.5    15.14 )-----------( 97       ( 12 Oct 2021 00:48 )             26.2     10-12    144  |  106  |  29<H>  ----------------------------<  136<H>  4.8   |  23  |  1.30    Ca    8.6      12 Oct 2021 00:48  Phos  4.7     10-12  Mg     3.0     10-12    TPro  5.2<L>  /  Alb  3.6  /  TBili  1.2  /  DBili  x   /  AST  61<H>  /  ALT  17  /  AlkPhos  72  10-12    LIVER FUNCTIONS - ( 12 Oct 2021 00:48 )  Alb: 3.6 g/dL / Pro: 5.2 g/dL / ALK PHOS: 72 U/L / ALT: 17 U/L / AST: 61 U/L / GGT: x           PT/INR - ( 12 Oct 2021 00:48 )   PT: 14.8 sec;   INR: 1.25 ratio         PTT - ( 12 Oct 2021 00:48 )  PTT:48.1 sec  ABG - ( 12 Oct 2021 06:04 )  pH, Arterial: 7.37  pH, Blood: x     /  pCO2: 42    /  pO2: 74    / HCO3: 24    / Base Excess: -0.9  /  SaO2: 96.8                ======================Micro/Rad/Cardio=================  CXR: Reviewed  Echo:Reviewed  ======================================================  PAST MEDICAL & SURGICAL HISTORY:  HTN (hypertension)    Complete heart block    CAD (coronary artery disease)    Paroxysmal atrial fibrillation    History of mechanical aortic valve replacement    History of pacemaker    S/P CABG (coronary artery bypass graft)      ====================ASSESSMENT ==============  MR/TR, afib s/p MVR, TVR, LAAL, endocardial lead extraction, epicardial lead placement on 10/11   Hx of tachy-arely syndrome, 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 650 milliGRAM(s) Oral every 6 hours  gabapentin 100 milliGRAM(s) Oral every 8 hours  HYDROmorphone  Injectable 0.5 milliGRAM(s) IV Push every 6 hours PRN Severe Pain (7 - 10)  oxyCODONE    IR 5 milliGRAM(s) Oral every 4 hours PRN Moderate Pain (4 - 6)  oxyCODONE    IR 10 milliGRAM(s) Oral every 4 hours PRN Severe Pain (7 - 10)    ==================== RESPIRATORY======================  Extubated overnight, initially on Aerosol Mask, escalated to HFO2 50%/50L   Encourage incentive spirometry, continue pulse ox monitoring, follow ABGs     ====================CARDIOVASCULAR==================  MR/TR, Afib s/p MVR, TVR, LAAL, endocardial lead extraction, epicardial lead placement on 10/11  Prior PPM in place, wires cut; Temporary back up pacing wires in place   Lactate peaked to 3.6 this AM, s/p 2U pRBCs, now downtrending to 3.1, continue to monitor   Continue invasive hemodynamic monitoring   Pressor support with IV Levophed and IV Vasopressin, wean as tolerated   Maintain inotropic support with IV Dobutamine   Afib prophylaxis with Amiodarone     aMIOdarone    Tablet 400 milliGRAM(s) Oral two times a day  DOBUTamine Infusion 5 MICROgram(s)/kG/Min (9.45 mL/Hr) IV Continuous <Continuous>  norepinephrine Infusion 0.12 MICROgram(s)/kG/Min (14.2 mL/Hr) IV Continuous <Continuous>  vasopressin Infusion 0.067 Unit(s)/Min (4 mL/Hr) IV Continuous <Continuous>    ===================HEMATOLOGIC/ONC ===================  Thrombocytopenia and acute blood loss anemia in setting of moderate post op mediastinal bleeding requiring Amicar post op   Monitor H&H/Plts    Will start Hep gtt for AC for afib and valves.    ===================== RENAL =========================  Continue monitoring urine output, I&OS, BUN/Cr   Replete lytes PRN. Keep K> 4 and Mg >2    ==================== GASTROINTESTINAL===================  Advance patient diet as tolerated   Bowel regimen with Miralax and Senna     ascorbic acid 500 milliGRAM(s) Oral two times a day  GI prophylaxis, pantoprazole  Injectable 40 milliGRAM(s) IV Push daily  polyethylene glycol 3350 17 Gram(s) Oral daily  potassium chloride  10 mEq/50 mL IVPB 10 milliEquivalent(s) IV Intermittent every 1 hour  senna 2 Tablet(s) Oral at bedtime  sodium chloride 0.9%. 1000 milliLiter(s) (10 mL/Hr) IV Continuous <Continuous>    =======================    ENDOCRINE  =====================  Stress hyperglycemia, continue glucose control with IV insulin drip     dextrose 50% Injectable 25 milliLiter(s) IV Push every 15 minutes  insulin regular Infusion 3 Unit(s)/Hr (3 mL/Hr) IV Continuous <Continuous>    ========================INFECTIOUS DISEASE================  Afebrile, WBC downtrending 18.29->15.14   Continue trending WBC and monitoring fever curve   Perioperative coverage with Cefuroxime     cefuroxime  IVPB 1500 milliGRAM(s) IV Intermittent every 8 hours          Patient requires continuous monitoring with bedside rhythm monitoring, pulse ox monitoring, and intermittent blood gas analysis. Care plan discussed with ICU care team. Patient remained critical and at risk for life threatening decompensation.     By signing my name below, I, Caitlin Moose, attest that this documentation has been prepared under the direction and in the presence of SARITA Nolasco   Electronically signed: Bill Lainez, 10-12-21 @ 07:25    I, Kalani Blount, personally performed the services described in this documentation. all medical record entries made by the scribe were at my direction and in my presence. I have reviewed the chart and agree that the record reflects my personal performance and is accurate and complete  Electronically signed: SARITA Nolasco

## 2021-10-13 LAB
ALBUMIN SERPL ELPH-MCNC: 3.3 G/DL — SIGNIFICANT CHANGE UP (ref 3.3–5)
ALBUMIN SERPL ELPH-MCNC: 3.6 G/DL — SIGNIFICANT CHANGE UP (ref 3.3–5)
ALP SERPL-CCNC: 69 U/L — SIGNIFICANT CHANGE UP (ref 40–120)
ALP SERPL-CCNC: 74 U/L — SIGNIFICANT CHANGE UP (ref 40–120)
ALT FLD-CCNC: 22 U/L — SIGNIFICANT CHANGE UP (ref 10–45)
ALT FLD-CCNC: 24 U/L — SIGNIFICANT CHANGE UP (ref 10–45)
AMYLASE P1 CFR SERPL: 110 U/L — SIGNIFICANT CHANGE UP (ref 25–125)
ANION GAP SERPL CALC-SCNC: 13 MMOL/L — SIGNIFICANT CHANGE UP (ref 5–17)
ANION GAP SERPL CALC-SCNC: 16 MMOL/L — SIGNIFICANT CHANGE UP (ref 5–17)
APTT BLD: 36.8 SEC — HIGH (ref 27.5–35.5)
APTT BLD: 73.6 SEC — HIGH (ref 27.5–35.5)
AST SERPL-CCNC: 67 U/L — HIGH (ref 10–40)
AST SERPL-CCNC: 74 U/L — HIGH (ref 10–40)
BASE EXCESS BLDV CALC-SCNC: -0.8 MMOL/L — SIGNIFICANT CHANGE UP (ref -2–2)
BASE EXCESS BLDV CALC-SCNC: -0.9 MMOL/L — SIGNIFICANT CHANGE UP (ref -2–2)
BASE EXCESS BLDV CALC-SCNC: -1.6 MMOL/L — SIGNIFICANT CHANGE UP (ref -2–2)
BASE EXCESS BLDV CALC-SCNC: -2.9 MMOL/L — LOW (ref -2–2)
BASE EXCESS BLDV CALC-SCNC: -3 MMOL/L — LOW (ref -2–2)
BILIRUB SERPL-MCNC: 0.8 MG/DL — SIGNIFICANT CHANGE UP (ref 0.2–1.2)
BILIRUB SERPL-MCNC: 1.1 MG/DL — SIGNIFICANT CHANGE UP (ref 0.2–1.2)
BLD GP AB SCN SERPL QL: NEGATIVE — SIGNIFICANT CHANGE UP
BUN SERPL-MCNC: 42 MG/DL — HIGH (ref 7–23)
BUN SERPL-MCNC: 46 MG/DL — HIGH (ref 7–23)
CA-I SERPL-SCNC: 1.05 MMOL/L — LOW (ref 1.15–1.33)
CA-I SERPL-SCNC: 1.07 MMOL/L — LOW (ref 1.15–1.33)
CALCIUM SERPL-MCNC: 7.9 MG/DL — LOW (ref 8.4–10.5)
CALCIUM SERPL-MCNC: 8.5 MG/DL — SIGNIFICANT CHANGE UP (ref 8.4–10.5)
CHLORIDE BLDV-SCNC: 102 MMOL/L — SIGNIFICANT CHANGE UP (ref 96–108)
CHLORIDE BLDV-SCNC: 104 MMOL/L — SIGNIFICANT CHANGE UP (ref 96–108)
CHLORIDE SERPL-SCNC: 102 MMOL/L — SIGNIFICANT CHANGE UP (ref 96–108)
CHLORIDE SERPL-SCNC: 98 MMOL/L — SIGNIFICANT CHANGE UP (ref 96–108)
CO2 BLDV-SCNC: 24 MMOL/L — SIGNIFICANT CHANGE UP (ref 22–26)
CO2 BLDV-SCNC: 25 MMOL/L — SIGNIFICANT CHANGE UP (ref 22–26)
CO2 BLDV-SCNC: 27 MMOL/L — HIGH (ref 22–26)
CO2 SERPL-SCNC: 21 MMOL/L — LOW (ref 22–31)
CO2 SERPL-SCNC: 21 MMOL/L — LOW (ref 22–31)
CREAT SERPL-MCNC: 1.57 MG/DL — HIGH (ref 0.5–1.3)
CREAT SERPL-MCNC: 1.65 MG/DL — HIGH (ref 0.5–1.3)
GAS PNL BLDA: SIGNIFICANT CHANGE UP
GAS PNL BLDV: 132 MMOL/L — LOW (ref 136–145)
GAS PNL BLDV: 136 MMOL/L — SIGNIFICANT CHANGE UP (ref 136–145)
GAS PNL BLDV: SIGNIFICANT CHANGE UP
GLUCOSE BLDC GLUCOMTR-MCNC: 108 MG/DL — HIGH (ref 70–99)
GLUCOSE BLDC GLUCOMTR-MCNC: 117 MG/DL — HIGH (ref 70–99)
GLUCOSE BLDC GLUCOMTR-MCNC: 118 MG/DL — HIGH (ref 70–99)
GLUCOSE BLDC GLUCOMTR-MCNC: 122 MG/DL — HIGH (ref 70–99)
GLUCOSE BLDC GLUCOMTR-MCNC: 124 MG/DL — HIGH (ref 70–99)
GLUCOSE BLDC GLUCOMTR-MCNC: 128 MG/DL — HIGH (ref 70–99)
GLUCOSE BLDC GLUCOMTR-MCNC: 135 MG/DL — HIGH (ref 70–99)
GLUCOSE BLDC GLUCOMTR-MCNC: 137 MG/DL — HIGH (ref 70–99)
GLUCOSE BLDC GLUCOMTR-MCNC: 156 MG/DL — HIGH (ref 70–99)
GLUCOSE BLDC GLUCOMTR-MCNC: 159 MG/DL — HIGH (ref 70–99)
GLUCOSE BLDC GLUCOMTR-MCNC: 165 MG/DL — HIGH (ref 70–99)
GLUCOSE BLDC GLUCOMTR-MCNC: 170 MG/DL — HIGH (ref 70–99)
GLUCOSE BLDV-MCNC: 147 MG/DL — HIGH (ref 70–99)
GLUCOSE BLDV-MCNC: 153 MG/DL — HIGH (ref 70–99)
GLUCOSE SERPL-MCNC: 101 MG/DL — HIGH (ref 70–99)
GLUCOSE SERPL-MCNC: 169 MG/DL — HIGH (ref 70–99)
HCO3 BLDV-SCNC: 23 MMOL/L — SIGNIFICANT CHANGE UP (ref 22–29)
HCO3 BLDV-SCNC: 23 MMOL/L — SIGNIFICANT CHANGE UP (ref 22–29)
HCO3 BLDV-SCNC: 25 MMOL/L — SIGNIFICANT CHANGE UP (ref 22–29)
HCO3 BLDV-SCNC: 26 MMOL/L — SIGNIFICANT CHANGE UP (ref 22–29)
HCO3 BLDV-SCNC: 26 MMOL/L — SIGNIFICANT CHANGE UP (ref 22–29)
HCT VFR BLD CALC: 22.3 % — LOW (ref 39–50)
HCT VFR BLD CALC: 22.6 % — LOW (ref 39–50)
HCT VFR BLDA CALC: 21 % — CRITICAL LOW (ref 39–51)
HCT VFR BLDA CALC: 22 % — LOW (ref 39–51)
HGB BLD CALC-MCNC: 6.9 G/DL — CRITICAL LOW (ref 12.6–17.4)
HGB BLD CALC-MCNC: 7.2 G/DL — LOW (ref 12.6–17.4)
HGB BLD-MCNC: 7.3 G/DL — LOW (ref 13–17)
HGB BLD-MCNC: 7.5 G/DL — LOW (ref 13–17)
HOROWITZ INDEX BLDV+IHG-RTO: 100 — SIGNIFICANT CHANGE UP
HOROWITZ INDEX BLDV+IHG-RTO: 80 — SIGNIFICANT CHANGE UP
HOROWITZ INDEX BLDV+IHG-RTO: 80 — SIGNIFICANT CHANGE UP
INR BLD: 1.1 RATIO — SIGNIFICANT CHANGE UP (ref 0.88–1.16)
INR BLD: 1.19 RATIO — HIGH (ref 0.88–1.16)
LACTATE BLDV-MCNC: 0.9 MMOL/L — SIGNIFICANT CHANGE UP (ref 0.7–2)
LACTATE BLDV-MCNC: 1.2 MMOL/L — SIGNIFICANT CHANGE UP (ref 0.7–2)
LIDOCAIN IGE QN: 8 U/L — SIGNIFICANT CHANGE UP (ref 7–60)
MAGNESIUM SERPL-MCNC: 2.4 MG/DL — SIGNIFICANT CHANGE UP (ref 1.6–2.6)
MAGNESIUM SERPL-MCNC: 2.4 MG/DL — SIGNIFICANT CHANGE UP (ref 1.6–2.6)
MCHC RBC-ENTMCNC: 26.4 PG — LOW (ref 27–34)
MCHC RBC-ENTMCNC: 26.4 PG — LOW (ref 27–34)
MCHC RBC-ENTMCNC: 32.7 GM/DL — SIGNIFICANT CHANGE UP (ref 32–36)
MCHC RBC-ENTMCNC: 33.2 GM/DL — SIGNIFICANT CHANGE UP (ref 32–36)
MCV RBC AUTO: 79.6 FL — LOW (ref 80–100)
MCV RBC AUTO: 80.8 FL — SIGNIFICANT CHANGE UP (ref 80–100)
NRBC # BLD: 0 /100 WBCS — SIGNIFICANT CHANGE UP (ref 0–0)
NRBC # BLD: 0 /100 WBCS — SIGNIFICANT CHANGE UP (ref 0–0)
PCO2 BLDV: 43 MMHG — SIGNIFICANT CHANGE UP (ref 42–55)
PCO2 BLDV: 46 MMHG — SIGNIFICANT CHANGE UP (ref 42–55)
PCO2 BLDV: 47 MMHG — SIGNIFICANT CHANGE UP (ref 42–55)
PCO2 BLDV: 50 MMHG — SIGNIFICANT CHANGE UP (ref 42–55)
PCO2 BLDV: 52 MMHG — SIGNIFICANT CHANGE UP (ref 42–55)
PH BLDV: 7.3 — LOW (ref 7.32–7.43)
PH BLDV: 7.31 — LOW (ref 7.32–7.43)
PH BLDV: 7.32 — SIGNIFICANT CHANGE UP (ref 7.32–7.43)
PH BLDV: 7.33 — SIGNIFICANT CHANGE UP (ref 7.32–7.43)
PH BLDV: 7.34 — SIGNIFICANT CHANGE UP (ref 7.32–7.43)
PHOSPHATE SERPL-MCNC: 5 MG/DL — HIGH (ref 2.5–4.5)
PHOSPHATE SERPL-MCNC: 5.2 MG/DL — HIGH (ref 2.5–4.5)
PLATELET # BLD AUTO: 84 K/UL — LOW (ref 150–400)
PLATELET # BLD AUTO: 90 K/UL — LOW (ref 150–400)
PO2 BLDV: 34 MMHG — SIGNIFICANT CHANGE UP (ref 25–45)
PO2 BLDV: 34 MMHG — SIGNIFICANT CHANGE UP (ref 25–45)
PO2 BLDV: 36 MMHG — SIGNIFICANT CHANGE UP (ref 25–45)
PO2 BLDV: 38 MMHG — SIGNIFICANT CHANGE UP (ref 25–45)
PO2 BLDV: 42 MMHG — SIGNIFICANT CHANGE UP (ref 25–45)
POTASSIUM BLDV-SCNC: 4 MMOL/L — SIGNIFICANT CHANGE UP (ref 3.5–5.1)
POTASSIUM BLDV-SCNC: 4.8 MMOL/L — SIGNIFICANT CHANGE UP (ref 3.5–5.1)
POTASSIUM SERPL-MCNC: 4.7 MMOL/L — SIGNIFICANT CHANGE UP (ref 3.5–5.3)
POTASSIUM SERPL-MCNC: 5.1 MMOL/L — SIGNIFICANT CHANGE UP (ref 3.5–5.3)
POTASSIUM SERPL-SCNC: 4.7 MMOL/L — SIGNIFICANT CHANGE UP (ref 3.5–5.3)
POTASSIUM SERPL-SCNC: 5.1 MMOL/L — SIGNIFICANT CHANGE UP (ref 3.5–5.3)
PROT SERPL-MCNC: 5.1 G/DL — LOW (ref 6–8.3)
PROT SERPL-MCNC: 5.4 G/DL — LOW (ref 6–8.3)
PROTHROM AB SERPL-ACNC: 13.1 SEC — SIGNIFICANT CHANGE UP (ref 10.6–13.6)
PROTHROM AB SERPL-ACNC: 14.2 SEC — HIGH (ref 10.6–13.6)
RBC # BLD: 2.76 M/UL — LOW (ref 4.2–5.8)
RBC # BLD: 2.84 M/UL — LOW (ref 4.2–5.8)
RBC # FLD: 20.2 % — HIGH (ref 10.3–14.5)
RBC # FLD: 20.4 % — HIGH (ref 10.3–14.5)
RH IG SCN BLD-IMP: POSITIVE — SIGNIFICANT CHANGE UP
SAO2 % BLDV: 60.2 % — LOW (ref 67–88)
SAO2 % BLDV: 60.5 % — LOW (ref 67–88)
SAO2 % BLDV: 65.6 % — LOW (ref 67–88)
SAO2 % BLDV: 68.5 % — SIGNIFICANT CHANGE UP (ref 67–88)
SAO2 % BLDV: 74.2 % — SIGNIFICANT CHANGE UP (ref 67–88)
SARS-COV-2 RNA SPEC QL NAA+PROBE: SIGNIFICANT CHANGE UP
SODIUM SERPL-SCNC: 135 MMOL/L — SIGNIFICANT CHANGE UP (ref 135–145)
SODIUM SERPL-SCNC: 136 MMOL/L — SIGNIFICANT CHANGE UP (ref 135–145)
WBC # BLD: 17.3 K/UL — HIGH (ref 3.8–10.5)
WBC # BLD: 17.67 K/UL — HIGH (ref 3.8–10.5)
WBC # FLD AUTO: 17.3 K/UL — HIGH (ref 3.8–10.5)
WBC # FLD AUTO: 17.67 K/UL — HIGH (ref 3.8–10.5)

## 2021-10-13 PROCEDURE — 35820 EXPLORE CHEST VESSELS: CPT

## 2021-10-13 PROCEDURE — 99291 CRITICAL CARE FIRST HOUR: CPT

## 2021-10-13 PROCEDURE — 35820 EXPLORE CHEST VESSELS: CPT | Mod: AS

## 2021-10-13 PROCEDURE — 71250 CT THORAX DX C-: CPT | Mod: 26

## 2021-10-13 PROCEDURE — 71045 X-RAY EXAM CHEST 1 VIEW: CPT | Mod: 26

## 2021-10-13 PROCEDURE — 99024 POSTOP FOLLOW-UP VISIT: CPT

## 2021-10-13 PROCEDURE — 93010 ELECTROCARDIOGRAM REPORT: CPT

## 2021-10-13 PROCEDURE — 71045 X-RAY EXAM CHEST 1 VIEW: CPT | Mod: 26,77,76

## 2021-10-13 PROCEDURE — 74176 CT ABD & PELVIS W/O CONTRAST: CPT | Mod: 26

## 2021-10-13 RX ORDER — CEFUROXIME AXETIL 250 MG
1500 TABLET ORAL EVERY 8 HOURS
Refills: 0 | Status: COMPLETED | OUTPATIENT
Start: 2021-10-13 | End: 2021-10-14

## 2021-10-13 RX ORDER — FENTANYL CITRATE 50 UG/ML
25 INJECTION INTRAVENOUS
Refills: 0 | Status: DISCONTINUED | OUTPATIENT
Start: 2021-10-13 | End: 2021-10-16

## 2021-10-13 RX ORDER — DEXMEDETOMIDINE HYDROCHLORIDE IN 0.9% SODIUM CHLORIDE 4 UG/ML
0.2 INJECTION INTRAVENOUS
Qty: 200 | Refills: 0 | Status: DISCONTINUED | OUTPATIENT
Start: 2021-10-13 | End: 2021-10-14

## 2021-10-13 RX ORDER — FENTANYL CITRATE 50 UG/ML
25 INJECTION INTRAVENOUS ONCE
Refills: 0 | Status: DISCONTINUED | OUTPATIENT
Start: 2021-10-13 | End: 2021-10-13

## 2021-10-13 RX ORDER — ONDANSETRON 8 MG/1
4 TABLET, FILM COATED ORAL ONCE
Refills: 0 | Status: COMPLETED | OUTPATIENT
Start: 2021-10-13 | End: 2021-10-13

## 2021-10-13 RX ORDER — ACETAMINOPHEN 500 MG
1000 TABLET ORAL ONCE
Refills: 0 | Status: COMPLETED | OUTPATIENT
Start: 2021-10-13 | End: 2021-10-13

## 2021-10-13 RX ORDER — CALCIUM GLUCONATE 100 MG/ML
1 VIAL (ML) INTRAVENOUS ONCE
Refills: 0 | Status: COMPLETED | OUTPATIENT
Start: 2021-10-13 | End: 2021-10-13

## 2021-10-13 RX ORDER — BUMETANIDE 0.25 MG/ML
2 INJECTION INTRAMUSCULAR; INTRAVENOUS ONCE
Refills: 0 | Status: COMPLETED | OUTPATIENT
Start: 2021-10-13 | End: 2021-10-13

## 2021-10-13 RX ORDER — FUROSEMIDE 40 MG
20 TABLET ORAL ONCE
Refills: 0 | Status: COMPLETED | OUTPATIENT
Start: 2021-10-13 | End: 2021-10-13

## 2021-10-13 RX ORDER — BUMETANIDE 0.25 MG/ML
4 INJECTION INTRAMUSCULAR; INTRAVENOUS
Qty: 20 | Refills: 0 | Status: DISCONTINUED | OUTPATIENT
Start: 2021-10-13 | End: 2021-10-13

## 2021-10-13 RX ORDER — CHLORHEXIDINE GLUCONATE 213 G/1000ML
15 SOLUTION TOPICAL EVERY 12 HOURS
Refills: 0 | Status: DISCONTINUED | OUTPATIENT
Start: 2021-10-13 | End: 2021-10-14

## 2021-10-13 RX ADMIN — FENTANYL CITRATE 25 MICROGRAM(S): 50 INJECTION INTRAVENOUS at 04:35

## 2021-10-13 RX ADMIN — Medication 1000 MILLIGRAM(S): at 03:57

## 2021-10-13 RX ADMIN — VASOPRESSIN 4 UNIT(S)/MIN: 20 INJECTION INTRAVENOUS at 21:45

## 2021-10-13 RX ADMIN — BUMETANIDE 10 MG/HR: 0.25 INJECTION INTRAMUSCULAR; INTRAVENOUS at 00:57

## 2021-10-13 RX ADMIN — Medication 100 GRAM(S): at 16:23

## 2021-10-13 RX ADMIN — DEXMEDETOMIDINE HYDROCHLORIDE IN 0.9% SODIUM CHLORIDE 3.15 MICROGRAM(S)/KG/HR: 4 INJECTION INTRAVENOUS at 21:48

## 2021-10-13 RX ADMIN — Medication 14.2 MICROGRAM(S)/KG/MIN: at 21:45

## 2021-10-13 RX ADMIN — FENTANYL CITRATE 25 MICROGRAM(S): 50 INJECTION INTRAVENOUS at 16:32

## 2021-10-13 RX ADMIN — Medication 100 MILLIGRAM(S): at 21:45

## 2021-10-13 RX ADMIN — BUMETANIDE 2 MILLIGRAM(S): 0.25 INJECTION INTRAMUSCULAR; INTRAVENOUS at 00:35

## 2021-10-13 RX ADMIN — CHLORHEXIDINE GLUCONATE 5 MILLILITER(S): 213 SOLUTION TOPICAL at 05:19

## 2021-10-13 RX ADMIN — OXYCODONE HYDROCHLORIDE 10 MILLIGRAM(S): 5 TABLET ORAL at 08:30

## 2021-10-13 RX ADMIN — FENTANYL CITRATE 25 MICROGRAM(S): 50 INJECTION INTRAVENOUS at 04:20

## 2021-10-13 RX ADMIN — ATORVASTATIN CALCIUM 40 MILLIGRAM(S): 80 TABLET, FILM COATED ORAL at 21:44

## 2021-10-13 RX ADMIN — Medication 20 MILLIGRAM(S): at 00:00

## 2021-10-13 RX ADMIN — Medication 400 MILLIGRAM(S): at 03:42

## 2021-10-13 RX ADMIN — MUPIROCIN 1 APPLICATION(S): 20 OINTMENT TOPICAL at 19:30

## 2021-10-13 RX ADMIN — ONDANSETRON 4 MILLIGRAM(S): 8 TABLET, FILM COATED ORAL at 14:34

## 2021-10-13 RX ADMIN — Medication 9.45 MICROGRAM(S)/KG/MIN: at 21:46

## 2021-10-13 RX ADMIN — FENTANYL CITRATE 25 MICROGRAM(S): 50 INJECTION INTRAVENOUS at 15:02

## 2021-10-13 RX ADMIN — OXYCODONE HYDROCHLORIDE 10 MILLIGRAM(S): 5 TABLET ORAL at 08:31

## 2021-10-13 RX ADMIN — Medication 100 MILLIGRAM(S): at 13:59

## 2021-10-13 RX ADMIN — Medication 100 GRAM(S): at 23:34

## 2021-10-13 RX ADMIN — Medication 650 MILLIGRAM(S): at 23:34

## 2021-10-13 RX ADMIN — MUPIROCIN 1 APPLICATION(S): 20 OINTMENT TOPICAL at 05:41

## 2021-10-13 RX ADMIN — INSULIN HUMAN 3 UNIT(S)/HR: 100 INJECTION, SOLUTION SUBCUTANEOUS at 21:48

## 2021-10-13 RX ADMIN — SENNA PLUS 2 TABLET(S): 8.6 TABLET ORAL at 21:42

## 2021-10-13 NOTE — BRIEF OPERATIVE NOTE - COMMENTS
I, Edgar STEIN first assisted for the entirety of the procedure.
Blood loss unable to be accurately calculated due to the use of cell saver and cardiotomy suction

## 2021-10-13 NOTE — BRIEF OPERATIVE NOTE - NSICDXBRIEFPREOP_GEN_ALL_CORE_FT
PRE-OP DIAGNOSIS:  Tricuspid regurgitation 11-Oct-2021 15:07:55  Anup Jacobs  
PRE-OP DIAGNOSIS:  Pericardial effusion with cardiac tamponade 13-Oct-2021 12:44:25  Edgar Lynn

## 2021-10-13 NOTE — BRIEF OPERATIVE NOTE - NSICDXBRIEFPROCEDURE_GEN_ALL_CORE_FT
PROCEDURES:  Replacement, mitral and tricuspid valves 11-Oct-2021 15:07:24 MVR (tissue), TVR (tissue), LAAL, Epicardial Lead Placement Anup Jacobs  
PROCEDURES:  Sternotomy, repeat 13-Oct-2021 12:43:54  Edgar Lynn

## 2021-10-13 NOTE — PROGRESS NOTE ADULT - SUBJECTIVE AND OBJECTIVE BOX
24H hour events:     MEDICATIONS:  aspirin enteric coated 81 milliGRAM(s) Oral daily  buMETAnide Infusion 4 mG/Hr IV Continuous <Continuous>  DOBUTamine Infusion 5 MICROgram(s)/kG/Min IV Continuous <Continuous>  heparin  Infusion 630 Unit(s)/Hr IV Continuous <Continuous>  milrinone Infusion 0.2 MICROgram(s)/kG/Min IV Continuous <Continuous>  norepinephrine Infusion 0.12 MICROgram(s)/kG/Min IV Continuous <Continuous>  acetaminophen   Tablet .. 650 milliGRAM(s) Oral every 6 hours  gabapentin 100 milliGRAM(s) Oral every 8 hours  HYDROmorphone  Injectable 0.5 milliGRAM(s) IV Push every 6 hours PRN  oxyCODONE    IR 5 milliGRAM(s) Oral every 4 hours PRN  oxyCODONE    IR 10 milliGRAM(s) Oral every 4 hours PRN  bisacodyl Suppository 10 milliGRAM(s) Rectal once  pantoprazole  Injectable 40 milliGRAM(s) IV Push daily  polyethylene glycol 3350 17 Gram(s) Oral daily  senna 2 Tablet(s) Oral at bedtime  atorvastatin 40 milliGRAM(s) Oral at bedtime  dextrose 50% Injectable 50 milliLiter(s) IV Push every 15 minutes  dextrose 50% Injectable 25 milliLiter(s) IV Push every 15 minutes  insulin regular Infusion 3 Unit(s)/Hr IV Continuous <Continuous>  vasopressin Infusion 0.067 Unit(s)/Min IV Continuous <Continuous>  ascorbic acid 500 milliGRAM(s) Oral two times a day  chlorhexidine 0.12% Liquid 5 milliLiter(s) Oral Mucosa two times a day  chlorhexidine 2% Cloths 1 Application(s) Topical daily  mupirocin 2% Ointment 1 Application(s) Both Nostrils two times a day  sodium chloride 0.9%. 1000 milliLiter(s) IV Continuous <Continuous>      REVIEW OF SYSTEMS:  Complete 10point ROS negative.    PHYSICAL EXAM:  T(C): 36.2 (10-13-21 @ 00:00), Max: 36.7 (10-12-21 @ 20:00)  HR: 97 (10-13-21 @ 08:14) (72 - 110)  BP: --  RR: 26 (10-13-21 @ 08:00) (10 - 45)  SpO2: 92% (10-13-21 @ 08:14) (84% - 100%)  Wt(kg): --  I&O's Summary    12 Oct 2021 07:01  -  13 Oct 2021 07:00  --------------------------------------------------------  IN: 2502.2 mL / OUT: 1925 mL / NET: 577.2 mL    13 Oct 2021 07:01  -  13 Oct 2021 08:37  --------------------------------------------------------  IN: 37.1 mL / OUT: 130 mL / NET: -92.9 mL        Appearance: Normal	  Cardiovascular: Normal S1 S2, No JVD, No murmurs  Respiratory: Lungs clear to auscultation	  Psychiatry: A & O x 3, Mood & affect appropriate  Gastrointestinal:  Soft, Non-tender	  Neurologic: Non-focal  Extremities: Normal range of motion, No clubbing, cyanosis or edema  Vascular: Peripheral pulses palpable 2+ bilaterally        LABS:	 	    CBC Full  -  ( 13 Oct 2021 00:15 )  WBC Count : 17.67 K/uL  Hemoglobin : 7.3 g/dL  Hematocrit : 22.3 %  Platelet Count - Automated : 84 K/uL  Mean Cell Volume : 80.8 fl  Mean Cell Hemoglobin : 26.4 pg  Mean Cell Hemoglobin Concentration : 32.7 gm/dL    10-13  136  |  102  |  42<H>  ----------------------------<  101<H>  4.7   |  21<L>  |  1.57<H>  10-12  135  |  102  |  34<H>  ----------------------------<  130<H>  5.4<H>   |  21<L>  |  1.40<H>    Ca    8.5      13 Oct 2021 00:18  Ca    8.6      12 Oct 2021 16:24  Phos  5.0     10-13  Phos  4.7     10-12  Mg     2.4     10-13  Mg     3.0     10-12    TPro  5.4<L>  /  Alb  3.6  /  TBili  0.8  /  DBili  x   /  AST  74<H>  /  ALT  22  /  AlkPhos  69  10-13  TPro  5.2<L>  /  Alb  3.6  /  TBili  1.2  /  DBili  x   /  AST  61<H>  /  ALT  17  /  AlkPhos  72  10-12      TSH: Thyroid Stimulating Hormone, Serum (10.06.21 @ 05:04)    Thyroid Stimulating Hormone, Serum: 8.37 uIU/mL    CARDIAC MARKERS: Troponin T, High Sensitivity (10.11.21 @ 15:22)    Troponin T, High Sensitivity Result: 815: Specimen not hemolyzed        TELEMETRY: 	    ECG:  	    PREVIOUS DIAGNOSTIC TESTING:    [ ] Echocardiogram: < from: Transthoracic Echocardiogram (10.08.21 @ 08:00) >  Conclusions:  Normal left ventricular systolic function. LVEF 70% No segmental  wall motion abnormalities.  Normal appearing mitral leafets. Eccentric mitral  regurgitaiton directed posterolaterally, probably  "moderate".  Mechanical aortic valve without stenosis. Aortic  regurgitaiton, probably no more than "mild-moderate", is  present.  Right ventricular enlargement with decreased right  ventricular systolic function.  Severe tricuspid regurgitation. Pulmonary artery pressure  cannot be estimated; tricuspid regurgitation is too severe.    A device wire is notedin the right heart.    < end of copied text >      [ ]  Catheterization: < from: Cardiac Catheterization (10.06.21 @ 13:55) >  Indications:   Mitral Valve Regurgitation     Diagnostic Conclusions:   The coronary anatomy is normal     < end of copied text >   24H hour events: Tele with AF rates 80-100s with occasional PVCs. Pt denies dizziness, palpitations. Reports some pain to incision site.     MEDICATIONS:  aspirin enteric coated 81 milliGRAM(s) Oral daily  buMETAnide Infusion 4 mG/Hr IV Continuous <Continuous>  DOBUTamine Infusion 5 MICROgram(s)/kG/Min IV Continuous <Continuous>  heparin  Infusion 630 Unit(s)/Hr IV Continuous <Continuous>  milrinone Infusion 0.2 MICROgram(s)/kG/Min IV Continuous <Continuous>  norepinephrine Infusion 0.12 MICROgram(s)/kG/Min IV Continuous <Continuous>  acetaminophen   Tablet .. 650 milliGRAM(s) Oral every 6 hours  gabapentin 100 milliGRAM(s) Oral every 8 hours  HYDROmorphone  Injectable 0.5 milliGRAM(s) IV Push every 6 hours PRN  oxyCODONE    IR 5 milliGRAM(s) Oral every 4 hours PRN  oxyCODONE    IR 10 milliGRAM(s) Oral every 4 hours PRN  bisacodyl Suppository 10 milliGRAM(s) Rectal once  pantoprazole  Injectable 40 milliGRAM(s) IV Push daily  polyethylene glycol 3350 17 Gram(s) Oral daily  senna 2 Tablet(s) Oral at bedtime  atorvastatin 40 milliGRAM(s) Oral at bedtime  dextrose 50% Injectable 50 milliLiter(s) IV Push every 15 minutes  dextrose 50% Injectable 25 milliLiter(s) IV Push every 15 minutes  insulin regular Infusion 3 Unit(s)/Hr IV Continuous <Continuous>  vasopressin Infusion 0.067 Unit(s)/Min IV Continuous <Continuous>  ascorbic acid 500 milliGRAM(s) Oral two times a day  chlorhexidine 0.12% Liquid 5 milliLiter(s) Oral Mucosa two times a day  chlorhexidine 2% Cloths 1 Application(s) Topical daily  mupirocin 2% Ointment 1 Application(s) Both Nostrils two times a day  sodium chloride 0.9%. 1000 milliLiter(s) IV Continuous <Continuous>      REVIEW OF SYSTEMS:  Complete 10point ROS negative.    PHYSICAL EXAM:  T(C): 36.2 (10-13-21 @ 00:00), Max: 36.7 (10-12-21 @ 20:00)  HR: 97 (10-13-21 @ 08:14) (72 - 110)  BP: --  RR: 26 (10-13-21 @ 08:00) (10 - 45)  SpO2: 92% (10-13-21 @ 08:14) (84% - 100%)  Wt(kg): --  I&O's Summary    12 Oct 2021 07:01  -  13 Oct 2021 07:00  --------------------------------------------------------  IN: 2502.2 mL / OUT: 1925 mL / NET: 577.2 mL    13 Oct 2021 07:01  -  13 Oct 2021 08:37  --------------------------------------------------------  IN: 37.1 mL / OUT: 130 mL / NET: -92.9 mL        Appearance: Normal	  Cardiovascular: Irregularly irregular  Respiratory: Lungs clear to auscultation	  Psychiatry: A & O x 3, Mood & affect appropriate  Gastrointestinal:  Soft, Non-tender	  Neurologic: Non-focal  Extremities: No BLE edema      LABS:	 	    CBC Full  -  ( 13 Oct 2021 00:15 )  WBC Count : 17.67 K/uL  Hemoglobin : 7.3 g/dL  Hematocrit : 22.3 %  Platelet Count - Automated : 84 K/uL  Mean Cell Volume : 80.8 fl  Mean Cell Hemoglobin : 26.4 pg  Mean Cell Hemoglobin Concentration : 32.7 gm/dL    10-13  136  |  102  |  42<H>  ----------------------------<  101<H>  4.7   |  21<L>  |  1.57<H>  10-12  135  |  102  |  34<H>  ----------------------------<  130<H>  5.4<H>   |  21<L>  |  1.40<H>    Ca    8.5      13 Oct 2021 00:18  Ca    8.6      12 Oct 2021 16:24  Phos  5.0     10-13  Phos  4.7     10-12  Mg     2.4     10-13  Mg     3.0     10-12    TPro  5.4<L>  /  Alb  3.6  /  TBili  0.8  /  DBili  x   /  AST  74<H>  /  ALT  22  /  AlkPhos  69  10-13  TPro  5.2<L>  /  Alb  3.6  /  TBili  1.2  /  DBili  x   /  AST  61<H>  /  ALT  17  /  AlkPhos  72  10-12      TSH: Thyroid Stimulating Hormone, Serum (10.06.21 @ 05:04)    Thyroid Stimulating Hormone, Serum: 8.37 uIU/mL    CARDIAC MARKERS: Troponin T, High Sensitivity (10.11.21 @ 15:22)    Troponin T, High Sensitivity Result: 815: Specimen not hemolyzed        TELEMETRY: AF rates ~80-100s, occasional PVCs    PREVIOUS DIAGNOSTIC TESTING:    [ ] Echocardiogram: < from: Transthoracic Echocardiogram (10.08.21 @ 08:00) >  Conclusions:  Normal left ventricular systolic function. LVEF 70% No segmental  wall motion abnormalities.  Normal appearing mitral leafets. Eccentric mitral  regurgitaiton directed posterolaterally, probably  "moderate".  Mechanical aortic valve without stenosis. Aortic  regurgitaiton, probably no more than "mild-moderate", is  present.  Right ventricular enlargement with decreased right  ventricular systolic function.  Severe tricuspid regurgitation. Pulmonary artery pressure  cannot be estimated; tricuspid regurgitation is too severe.    A device wire is notedin the right heart.    < end of copied text >      [ ]  Catheterization: < from: Cardiac Catheterization (10.06.21 @ 13:55) >  Indications:   Mitral Valve Regurgitation     Diagnostic Conclusions:   The coronary anatomy is normal     < end of copied text >

## 2021-10-13 NOTE — DIETITIAN INITIAL EVALUATION ADULT. - PERTINENT MEDS FT
MEDICATIONS  (STANDING):  acetaminophen   Tablet .. 650 milliGRAM(s) Oral every 6 hours  ascorbic acid 500 milliGRAM(s) Oral two times a day  aspirin enteric coated 81 milliGRAM(s) Oral daily  atorvastatin 40 milliGRAM(s) Oral at bedtime  bisacodyl Suppository 10 milliGRAM(s) Rectal once  cefuroxime  IVPB 1500 milliGRAM(s) IV Intermittent every 8 hours  chlorhexidine 0.12% Liquid 15 milliLiter(s) Oral Mucosa every 12 hours  chlorhexidine 2% Cloths 1 Application(s) Topical daily  dexMEDEtomidine Infusion 0.2 MICROgram(s)/kG/Hr (3.15 mL/Hr) IV Continuous <Continuous>  dextrose 50% Injectable 50 milliLiter(s) IV Push every 15 minutes  dextrose 50% Injectable 25 milliLiter(s) IV Push every 15 minutes  DOBUTamine Infusion 5 MICROgram(s)/kG/Min (9.45 mL/Hr) IV Continuous <Continuous>  gabapentin 100 milliGRAM(s) Oral every 8 hours  insulin regular Infusion 3 Unit(s)/Hr (3 mL/Hr) IV Continuous <Continuous>  mupirocin 2% Ointment 1 Application(s) Both Nostrils two times a day  norepinephrine Infusion 0.12 MICROgram(s)/kG/Min (14.2 mL/Hr) IV Continuous <Continuous>  pantoprazole  Injectable 40 milliGRAM(s) IV Push daily  polyethylene glycol 3350 17 Gram(s) Oral daily  senna 2 Tablet(s) Oral at bedtime  sodium chloride 0.9%. 1000 milliLiter(s) (10 mL/Hr) IV Continuous <Continuous>  vasopressin Infusion 0.067 Unit(s)/Min (4 mL/Hr) IV Continuous <Continuous>    MEDICATIONS  (PRN):  fentaNYL    Injectable 25 MICROGram(s) IV Push every 3 hours PRN Severe Pain (7 - 10)  oxyCODONE    IR 5 milliGRAM(s) Oral every 4 hours PRN Moderate Pain (4 - 6)  oxyCODONE    IR 10 milliGRAM(s) Oral every 4 hours PRN Severe Pain (7 - 10)

## 2021-10-13 NOTE — PRE-ANESTHESIA EVALUATION ADULT - NSANTHPROCED_GEN_ALL_CORE
Transesophageal Echocardiogram
Arterial Catheter/Central Venous Catheter/Pulmonary Artery Catheter/Transesophageal Echocardiogram

## 2021-10-13 NOTE — PROGRESS NOTE ADULT - ASSESSMENT
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 Jose Patterson), CAD s/p CABG in 2009, Paroxysmal AF on Coumadin (non-compliant with A/C and Metoprolol), who admitted to [facility] with acute heart failure exacerbation/sob/LAU.  Patient states he has to stop and catch his breath when walking up stairs. On TTE found to have low normal EF 50-55%, mildly reduced RV function, mod-severe MR and severe TR. He was transferred to [facility] for open heart surgery with Dr. Hua. As per patient, he has not followed up regarding his pacemaker. Attempt made to interrogate it on this admission revealing it is EOL, no response with  or magnet.       1. Severe MR/TR, severely dilated RV  2. 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 AF  4. h/o mechanical AVR 2008  5. CAD s/p CABG 2009  6. 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 Jose Patterson), CAD s/p CABG in 2009, Paroxysmal AF on Coumadin (non-compliant with A/C and Metoprolol), who admitted to [facility] with acute heart failure exacerbation/sob/LAU.  Patient states he has to stop and catch his breath when walking up stairs. On TTE found to have low normal EF 50-55%, mildly reduced RV function, mod-severe MR and severe TR. He was transferred to [facility] for open heart surgery with Dr. Hua. As per patient, he has not followed up regarding his pacemaker. Attempt made to interrogate it on this admission revealing it is EOL, no response with  or magnet.       1. Severe MR/TR, severely dilated RV s/p MVR/TVR, LAAL, LV epicardial wire  2. ?tachy/arely syndrome s/p MDT dual chamber pacemaker - EOL  3. AF  4. h/o mechanical AVR 2008  5. CAD s/p CABG 2009  6. HTN    - WBC 17.67, downtrending  - AF, on AC with heparin gtt  - On multiple pressors. Attempting to wean off today.   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 Jose Patterson), CAD s/p CABG in 2009, Paroxysmal AF on Coumadin (non-compliant with A/C and Metoprolol), who admitted to [facility] with acute heart failure exacerbation/sob/LAU.  Patient states he has to stop and catch his breath when walking up stairs. On TTE found to have low normal EF 50-55%, mildly reduced RV function, mod-severe MR and severe TR. He was transferred to [facility] for open heart surgery with Dr. Hua. As per patient, he has not followed up regarding his pacemaker. Attempt made to interrogate it on this admission revealing it is EOL, no response with  or magnet.       1. Severe MR/TR, severely dilated RV s/p MVR/TVR, LAAL, RA & RV endocardial lead extraction, LV epicardial wire placement  2. ?tachy/arely syndrome s/p MDT dual chamber pacemaker - EOL; 2008 pulse generator intact  3. AF  4. h/o mechanical AVR 2008  5. CAD s/p CABG 2009  6. HTN    - WBC 17.67, downtrending  - AF, on AC with heparin gtt  - CT with ?pericardial hematoma  - On multiple pressors. Attempting to wean off today.   - Please reconsult when pt is ready for pulse generator change.    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 Jose Patterson), CAD s/p CABG in 2009, Paroxysmal AF on Coumadin (non-compliant with A/C and Metoprolol), who admitted to [facility] with acute heart failure exacerbation/sob/LAU.  Patient states he has to stop and catch his breath when walking up stairs. On TTE found to have low normal EF 50-55%, mildly reduced RV function, mod-severe MR and severe TR. He was transferred to [facility] for open heart surgery with Dr. Hua. As per patient, he has not followed up regarding his pacemaker. Attempt made to interrogate it on this admission revealing it is EOL, no response with  or magnet.       1. Severe MR/TR, severely dilated RV s/p MVR/TVR, LAAL, RA & RV endocardial lead extraction, LV epicardial wire placement  2. ?tachy/arely syndrome s/p MDT dual chamber pacemaker - EOL; 2008 pulse generator intact  3. AF  4. h/o mechanical AVR 2008  5. CAD s/p CABG 2009  6. HTN    - WBC 17.67, downtrending  - AF, on AC with heparin gtt  - CT with ?pericardial hematoma  - On multiple pressors. Attempting to wean off today.   - No EP interventions at this time. Pulse generator change when pt is stable.    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 Jose Patterson), CAD s/p CABG in 2009, Paroxysmal AF on Coumadin (non-compliant with A/C and Metoprolol), who admitted to [facility] with acute heart failure exacerbation/sob/LAU.  Patient states he has to stop and catch his breath when walking up stairs. On TTE found to have low normal EF 50-55%, mildly reduced RV function, mod-severe MR and severe TR. He was transferred to [facility] for open heart surgery with Dr. Hua. As per patient, he has not followed up regarding his pacemaker. Attempt made to interrogate it on this admission revealing it is EOL, no response with  or magnet.       1. Severe MR/TR, severely dilated RV s/p MVR/TVR, LAAL, RA & RV endocardial lead extraction, LV epicardial wire placement  2. ?tachy/arely syndrome s/p MDT dual chamber pacemaker - EOL; 2008 pulse generator intact  3. AF  4. h/o mechanical AVR 2008  5. CAD s/p CABG 2009  6. HTN    - WBC 17.67, downtrending  - AF, on AC with heparin gtt  - CT with ?pericardial effusion/hematoma  - On multiple pressors. Attempting to wean off today.   - No EP interventions at this time. Pulse generator change when pt is stable.

## 2021-10-13 NOTE — DIETITIAN INITIAL EVALUATION ADULT. - PROBLEM SELECTOR PLAN 5
Patient with shortness of breath found to have at least mod MR on TTE done at OSH  Dr. Hau to review BRITT films  Continue BB  Continue Lasix 40mg  CXR

## 2021-10-13 NOTE — BRIEF OPERATIVE NOTE - NSICDXBRIEFPOSTOP_GEN_ALL_CORE_FT
POST-OP DIAGNOSIS:  Pericardial effusion with cardiac tamponade 13-Oct-2021 12:44:44  Edgar Lynn  
POST-OP DIAGNOSIS:  Tricuspid regurgitation 11-Oct-2021 15:08:09  Aunp Jacobs

## 2021-10-13 NOTE — DIETITIAN INITIAL EVALUATION ADULT. - OTHER INFO
Upon RD visit, pt status post repeat sternotomy; remains intubated & sedated with precedex. Pt ordered for pressors: levophed & vasopressin at this time. Pt also ordered for dobutamine drip as well as bumex drip at this time.     Pt previously on PO diet, consuming 0-100% at meals, more recently seems that pt appetite had decreased.  No nausea, vomiting or diarrhea noted. Last bowel movement 10/6 per flow sheets. Pt with constipation per flow sheets. Pt ordered for bowel regimen: senna & miralax.    Pt with dosing weight of 139 pounds (10/13/21). Weight in-house between 137-155 pounds; likely related to fluid shifts as pt on bumex gtt; will continue to monitor trends. No recent weight trends noted per Mamadou ALAS at this time. Will continue to monitor trends as able.

## 2021-10-13 NOTE — DIETITIAN INITIAL EVALUATION ADULT. - CHIEF COMPLAINT
"69 y/o Male with PMH HTN, CAD s/p CABG in 2009, Complete Heart Block s/p PPM placement, Paroxysmal Afib on coumadin, Aortic stenosis s/p Mechanical AVR who admitted to OSH with acute heart failure exacerbation/sob/LAU." Pt status post MVR, TVR, LAAL, endocardial lead extraction, epicardial lead placement    on 10/11; TTE 10/12. Pt status post repeat sternotomy today, 10/13. "69 y/o Male with PMH HTN, CAD s/p CABG in 2009, Complete Heart Block s/p PPM placement, Paroxysmal Afib on coumadin, Aortic stenosis s/p Mechanical AVR who admitted to OSH with acute heart failure exacerbation/sob/LAU." Pt status post MVR, TVR, LAAL, endocardial lead extraction, epicardial lead placement on 10/11; TTE 10/12. Pt status post repeat sternotomy today, 10/13.

## 2021-10-13 NOTE — BRIEF OPERATIVE NOTE - OPERATION/FINDINGS
evacuation of both blood and organized clot predominately along anterior and lateral aspect of right atrium surface .  a
MVR (tissue), TVR (tissue), LAAL, Epicardial Lead Placement, Endocardial Lead Extraction

## 2021-10-13 NOTE — PROGRESS NOTE ADULT - SUBJECTIVE AND OBJECTIVE BOX
EDISON ROBLES  MRN-[id number]  Patient is a 71y old  Male who presents with a chief complaint of SOB/Open Heart Surg eval (13 Oct 2021 14:52)    HPI:  Patient is a 70M PMH HTN, CAD s/p CABG in 2009, Complete Heart Block s/p PPM placement, Paroxysmal Afib on coumadin, Aortic stenosis s/p Mechanical AVR who admitted to OSH with acute heart failure exacerbation/sob/LAU.  Patient states he has to stop and catch his breath when walking up stairs. On echo to have low normal EF 50-55%, Mild reduced RV fx, mod-severe MR and severe TR. Transferred to [facility] for open heart surgery with Dr. Hua. Patient endorses shortness of breath and LAU, denies chest pain, abdominal pain, fevers, chills, nausea.  (05 Oct 2021 23:42)      Surgery/Hospital course:  10/11 MVR, TVR, LAAL, endocardial lead extraction, epicardial lead placement   10/12 TTE: no effusion   10/13 CT: no RP bleed + blood in L testes 1 PRBC OVN, TTE: 12.4 x 9.5 x 13.2 cm hematoma is seen in the right chest with mass effect on the right atrium. The hematoma appears to be in the pericardial space. The pericardial drain is displaced laterally from the hematoma. 1 small pleural effusion with patchy opacities, suggestive of pulmonary edema. Small right groin hematoma. Dilated IVC due to right atrial compression from the hematoma. RTOR washout.     Today/Overnight:  CT chest notable for 12.4 x 9.5 x 13.2 cm hematoma seen in R chest with mass effect on the right atrium s/p RTOR for sternotomy, washout on this AM. Inotropic support with IV Primacor weaned to off, maintain Dobutamine. Noted to have firm ecchymosis around R femoral a line site appears to be improving, will continue to monitor closely.       Vital Signs Last 24 Hrs  T(C): 36.2 (13 Oct 2021 20:00), Max: 36.4 (13 Oct 2021 08:00)  T(F): 97.2 (13 Oct 2021 20:00), Max: 97.5 (13 Oct 2021 08:00)  HR: 65 (13 Oct 2021 23:00) (65 - 103)  BP: --  BP(mean): --  RR: 17 (13 Oct 2021 23:00) (10 - 51)  SpO2: 98% (13 Oct 2021 23:00) (88% - 100%)  ============================I/O===========================  I&O's Detail    12 Oct 2021 07:01  -  13 Oct 2021 07:00  --------------------------------------------------------  IN:    Albumin 5%  - 250 mL: 250 mL    Bumetanide: 20 mL    Bumetanide: 100 mL    DOBUTamine: 212.8 mL    Heparin: 113.4 mL    Insulin: 44 mL    IV PiggyBack: 250 mL    Milrinone: 19 mL    Milrinone: 57.3 mL    Norepinephrine: 165.7 mL    PRBCs (Packed Red Blood Cells): 900 mL    sodium chloride 0.9%: 240 mL    Vasopressin: 130 mL  Total IN: 2502.2 mL    OUT:    Chest Tube (mL): 535 mL    Chest Tube (mL): 220 mL    Indwelling Catheter - Urethral (mL): 1170 mL  Total OUT: 1925 mL    Total NET: 577.2 mL      13 Oct 2021 07:01  -  13 Oct 2021 23:07  --------------------------------------------------------  IN:    Dexmedetomidine: 131 mL    DOBUTamine: 61.2 mL    Heparin: 6.3 mL    Insulin: 9.5 mL    IV PiggyBack: 150 mL    Milrinone: 10.9 mL    Norepinephrine: 71 mL    sodium chloride 0.9%: 110 mL    Vasopressin: 40 mL    Vital1.0: 90 mL  Total IN: 679.9 mL    OUT:    Bumetanide: 0 mL    Chest Tube (mL): 430 mL    Chest Tube (mL): 220 mL    Indwelling Catheter - Urethral (mL): 1335 mL  Total OUT: 1985 mL    Total NET: -1305.1 mL        ============================ LABS =========================                        7.5    17.30 )-----------( 90       ( 13 Oct 2021 13:37 )             22.6     10-13    135  |  98  |  46<H>  ----------------------------<  169<H>  5.1   |  21<L>  |  1.65<H>    Ca    7.9<L>      13 Oct 2021 13:37  Phos  5.2     10-13  Mg     2.4     10-13    TPro  5.1<L>  /  Alb  3.3  /  TBili  1.1  /  DBili  x   /  AST  67<H>  /  ALT  24  /  AlkPhos  74  10-13    LIVER FUNCTIONS - ( 13 Oct 2021 13:37 )  Alb: 3.3 g/dL / Pro: 5.1 g/dL / ALK PHOS: 74 U/L / ALT: 24 U/L / AST: 67 U/L / GGT: x           PT/INR - ( 13 Oct 2021 13:37 )   PT: 13.1 sec;   INR: 1.10 ratio         PTT - ( 13 Oct 2021 13:37 )  PTT:36.8 sec  ABG - ( 13 Oct 2021 19:29 )  pH, Arterial: 7.38  pH, Blood: x     /  pCO2: 43    /  pO2: 155   / HCO3: 25    / Base Excess: 0.2   /  SaO2: 99.4                ======================Micro/Rad/Cardio=================  CXR: Reviewed  Echo: Reviewed  ======================================================  PAST MEDICAL & SURGICAL HISTORY:  HTN (hypertension)    Complete heart block    CAD (coronary artery disease)    Paroxysmal atrial fibrillation    History of mechanical aortic valve replacement    History of pacemaker    S/P CABG (coronary artery bypass graft)      ========================ASSESSMENT ================  MR/TR, afib s/p MVR, TVR, LAAL, endocardial lead extraction, epicardial lead placement on 10/11   Pericardial effusion with cardiac tamponade, s/p RTOR for sternotomy/washout on 10/13   Hx of tachy-arely syndrome, 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 650 milliGRAM(s) Oral every 6 hours  dexMEDEtomidine Infusion 0.2 MICROgram(s)/kG/Hr (3.15 mL/Hr) IV Continuous <Continuous>  fentaNYL    Injectable 25 MICROGram(s) IV Push every 3 hours PRN Severe Pain (7 - 10)  gabapentin 100 milliGRAM(s) Oral every 8 hours`  oxyCODONE    IR 5 milliGRAM(s) Oral every 4 hours PRN Moderate Pain (4 - 6)  oxyCODONE    IR 10 milliGRAM(s) Oral every 4 hours PRN Severe Pain (7 - 10)    ==================== RESPIRATORY======================  Respiratory status required full ventilatory support, close monitoring of respiratory rate and breathing pattern, the following of ABG’s with A-line monitoring, continuous pulse oximetry monitoring     Mechanical Ventilation:  Mode: AC/ CMV (Assist Control/ Continuous Mandatory Ventilation)  RR (machine): 10  TV (machine): 470  FiO2: 50  PEEP: 8  ITime: 1  MAP: 9  PIP: 16      ====================CARDIOVASCULAR==================  MR/TR, Afib s/p MVR, TVR, LAAL, endocardial lead extraction, epicardial lead placement on 10/11  Prior PPM in place, wires cut; Temporary back up pacing wires in place   CT chest notable for 12.4 x 9.5 x 13.2 cm hematoma seen in R chest with mass effect on the right atrium  s/p RTOR for sternotomy, washout on 10/13   Lactate peaked to 4.3 on 10/11, now 0.8, continue trending   Continue invasive hemodynamic monitoring   Pressor support with IV Vasopressin and IV Levophed   Inotropic support with IV Primacor weaned to off, maintain Dobutamine  ASA/Lipitor for recent valve procedure     atorvastatin 40 milliGRAM(s) Oral at bedtime  aspirin enteric coated 81 milliGRAM(s) Oral daily  DOBUTamine Infusion 5 MICROgram(s)/kG/Min (9.45 mL/Hr) IV Continuous <Continuous>  norepinephrine Infusion 0.12 MICROgram(s)/kG/Min (14.2 mL/Hr) IV Continuous <Continuous>  vasopressin Infusion 0.067 Unit(s)/Min (4 mL/Hr) IV Continuous <Continuous>    ===================HEMATOLOGIC/ONC ===================  Thrombocytopenia and acute blood loss anemia in setting of moderate post op mediastinal bleeding requiring Amicar post op   Noted to have firm ecchymosis around R femoral a line site appears to be improving, will continue to monitor closely.   Monitor H&H/Plts      ===================== RENAL =========================  Continue monitoring I&Os, BUN/Cr, and urine output   Replete lytes PRN. Keep K> 4 and Mg >2    ==================== GASTROINTESTINAL===================  Tolerating tube feeds, Vital AF 1.5 shira @ 55cc/hr  Bowel regimen with Miralax and Senna     ascorbic acid 500 milliGRAM(s) Oral two times a day  bisacodyl Suppository 10 milliGRAM(s) Rectal once  GI prophylaxis, pantoprazole  Injectable 40 milliGRAM(s) IV Push daily  polyethylene glycol 3350 17 Gram(s) Oral daily  senna 2 Tablet(s) Oral at bedtime  sodium chloride 0.9%. 1000 milliLiter(s) (10 mL/Hr) IV Continuous <Continuous>    =======================    ENDOCRINE  =====================  Stress hyperglycemia, continue glucose control with IV insulin drip     dextrose 50% Injectable 50 milliLiter(s) IV Push every 15 minutes  dextrose 50% Injectable 25 milliLiter(s) IV Push every 15 minutes  insulin regular Infusion 3 Unit(s)/Hr (3 mL/Hr) IV Continuous <Continuous>    ========================INFECTIOUS DISEASE================  Afebrile, WBC downtrending 17.67->17.30  Continue trending WBC and monitoring fever curve   Perioperative coverage with Cefuroxime     cefuroxime  IVPB 1500 milliGRAM(s) IV Intermittent every 8 hours      Patient requires continuous monitoring with bedside rhythm monitoring, pulse oximetry monitoring, and continuous central venous and arterial pressure monitoring; and intermittent blood gas analysis.  Care plan discussed with ICU care team.    Patient remained critical, at risk for life threatening decompensation.   I have spent 35 minutes providing acute care with multiple re-evaluations throughout the evening.     By signing my name below, I, Caitlin Virk, attest that this documentation has been prepared under the direction and in the presence of SARITA Chacon   Electronically signed: Bill Lainez Devon Dougherty, PA rsonally performed the services described in this documentation. All medical record entries made by the scribbailey were at my direction and in my presence. I have reviewed the chart and agree that the record reflects my personal performance and is accurate and complete  Electronically signed: SARITA Chacon -13-21 @ 23:07       EDISON ROBLES  MRN-[id number]  Patient is a 71y old  Male who presents with a chief complaint of SOB/Open Heart Surg eval (13 Oct 2021 14:52)    HPI:  Patient is a 70M PMH HTN, CAD s/p CABG in 2009, Complete Heart Block s/p PPM placement, Paroxysmal Afib on coumadin, Aortic stenosis s/p Mechanical AVR who admitted to OSH with acute heart failure exacerbation/sob/LAU.  Patient states he has to stop and catch his breath when walking up stairs. On echo to have low normal EF 50-55%, Mild reduced RV fx, mod-severe MR and severe TR. Transferred to [facility] for open heart surgery with Dr. Hua. Patient endorses shortness of breath and LAU, denies chest pain, abdominal pain, fevers, chills, nausea.  (05 Oct 2021 23:42)      Surgery/Hospital course:  10/11 MVR, TVR, LAAL, endocardial lead extraction, epicardial lead placement   10/12 TTE: no effusion   10/13 CT: no RP bleed + blood in L testes 1 PRBC OVN, TTE: 12.4 x 9.5 x 13.2 cm hematoma is seen in the right chest with mass effect on the right atrium. The hematoma appears to be in the pericardial space. The pericardial drain is displaced laterally from the hematoma. 1 small pleural effusion with patchy opacities, suggestive of pulmonary edema. Small right groin hematoma. Dilated IVC due to right atrial compression from the hematoma. RTOR for washout.     Today/Overnight:  CT chest notable for 12.4 x 9.5 x 13.2 cm hematoma seen in R chest with mass effect on the right atrium s/p RTOR for sternotomy, washout on this AM. Inotropic support with IV Primacor weaned to off, maintain Dobutamine. Noted to have firm ecchymosis around R femoral a line site appears to be improving, will continue to monitor closely.     Vital Signs Last 24 Hrs  T(C): 36.2 (13 Oct 2021 20:00), Max: 36.4 (13 Oct 2021 08:00)  T(F): 97.2 (13 Oct 2021 20:00), Max: 97.5 (13 Oct 2021 08:00)  HR: 65 (13 Oct 2021 23:00) (65 - 103)  RR: 17 (13 Oct 2021 23:00) (10 - 51)  SpO2: 98% (13 Oct 2021 23:00) (88% - 100%)  ============================I/O===========================  I&O's Detail    12 Oct 2021 07:01  -  13 Oct 2021 07:00  --------------------------------------------------------  IN:    Albumin 5%  - 250 mL: 250 mL    Bumetanide: 20 mL    Bumetanide: 100 mL    DOBUTamine: 212.8 mL    Heparin: 113.4 mL    Insulin: 44 mL    IV PiggyBack: 250 mL    Milrinone: 19 mL    Milrinone: 57.3 mL    Norepinephrine: 165.7 mL    PRBCs (Packed Red Blood Cells): 900 mL    sodium chloride 0.9%: 240 mL    Vasopressin: 130 mL  Total IN: 2502.2 mL    OUT:    Chest Tube (mL): 535 mL    Chest Tube (mL): 220 mL    Indwelling Catheter - Urethral (mL): 1170 mL  Total OUT: 1925 mL    Total NET: 577.2 mL      13 Oct 2021 07:01  -  13 Oct 2021 23:07  --------------------------------------------------------  IN:    Dexmedetomidine: 131 mL    DOBUTamine: 61.2 mL    Heparin: 6.3 mL    Insulin: 9.5 mL    IV PiggyBack: 150 mL    Milrinone: 10.9 mL    Norepinephrine: 71 mL    sodium chloride 0.9%: 110 mL    Vasopressin: 40 mL    Vital1.0: 90 mL  Total IN: 679.9 mL    OUT:    Bumetanide: 0 mL    Chest Tube (mL): 430 mL    Chest Tube (mL): 220 mL    Indwelling Catheter - Urethral (mL): 1335 mL  Total OUT: 1985 mL    Total NET: -1305.1 mL      ============================ LABS =========================                        7.5    17.30 )-----------( 90       ( 13 Oct 2021 13:37 )             22.6     10-13    135  |  98  |  46<H>  ----------------------------<  169<H>  5.1   |  21<L>  |  1.65<H>    Ca    7.9<L>      13 Oct 2021 13:37  Phos  5.2     10-13  Mg     2.4     10-13    TPro  5.1<L>  /  Alb  3.3  /  TBili  1.1  /  DBili  x   /  AST  67<H>  /  ALT  24  /  AlkPhos  74  10-13    LIVER FUNCTIONS - ( 13 Oct 2021 13:37 )  Alb: 3.3 g/dL / Pro: 5.1 g/dL / ALK PHOS: 74 U/L / ALT: 24 U/L / AST: 67 U/L / GGT: x           PT/INR - ( 13 Oct 2021 13:37 )   PT: 13.1 sec;   INR: 1.10 ratio         PTT - ( 13 Oct 2021 13:37 )  PTT:36.8 sec  ABG - ( 13 Oct 2021 19:29 )  pH, Arterial: 7.38  pH, Blood: x     /  pCO2: 43    /  pO2: 155   / HCO3: 25    / Base Excess: 0.2   /  SaO2: 99.4      ======================Micro/Rad/Cardio=================  CXR: Reviewed  Echo: Reviewed  ======================================================  PAST MEDICAL & SURGICAL HISTORY:  HTN (hypertension)    Complete heart block    CAD (coronary artery disease)    Paroxysmal atrial fibrillation    History of mechanical aortic valve replacement    History of pacemaker    S/P CABG (coronary artery bypass graft)      ========================ASSESSMENT ================  MR/TR, afib s/p MVR, TVR, LAAL, endocardial lead extraction, epicardial lead placement on 10/11   Pericardial effusion with cardiac tamponade, s/p RTOR for sternotomy/washout on 10/13   Hx of tachy-arely syndrome, 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 650 milliGRAM(s) Oral every 6 hours  dexMEDEtomidine Infusion 0.2 MICROgram(s)/kG/Hr (3.15 mL/Hr) IV Continuous <Continuous>  fentaNYL    Injectable 25 MICROGram(s) IV Push every 3 hours PRN Severe Pain (7 - 10)  gabapentin 100 milliGRAM(s) Oral every 8 hours`  oxyCODONE    IR 5 milliGRAM(s) Oral every 4 hours PRN Moderate Pain (4 - 6)  oxyCODONE    IR 10 milliGRAM(s) Oral every 4 hours PRN Severe Pain (7 - 10)    ==================== RESPIRATORY======================  Respiratory status required full ventilatory support, close monitoring of respiratory rate and breathing pattern, the following of ABG’s with A-line monitoring, continuous pulse oximetry monitoring   Required higher PEEP and FiO2 requirements initially out of OR, will continue to attempt to wean down support and CPAP as tolerated.     Mechanical Ventilation:  Mode: AC/ CMV (Assist Control/ Continuous Mandatory Ventilation)  RR (machine): 10  TV (machine): 470  FiO2: 50  PEEP: 8  ITime: 1  MAP: 9  PIP: 16    ====================CARDIOVASCULAR==================  MR/TR, Afib s/p MVR, TVR, LAAL, endocardial lead extraction, epicardial lead placement on 10/11  Prior PPM in place, wires cut; Temporary back up pacing wires in place   CT chest notable for 12.4 x 9.5 x 13.2 cm hematoma seen in R chest with mass effect on the right atrium  s/p RTOR for sternotomy, washout on 10/13   Lactate peaked to 4.3 on 10/11, now 0.8, continue trending   Continue invasive hemodynamic monitoring   Pressor support with IV Vasopressin and IV Levophed   Inotropic support with IV Primacor weaned to off, maintain Dobutamine for RV support  ASA/Lipitor for recent valve procedure     atorvastatin 40 milliGRAM(s) Oral at bedtime  aspirin enteric coated 81 milliGRAM(s) Oral daily  DOBUTamine Infusion 5 MICROgram(s)/kG/Min (9.45 mL/Hr) IV Continuous <Continuous>  norepinephrine Infusion 0.12 MICROgram(s)/kG/Min (14.2 mL/Hr) IV Continuous <Continuous>  vasopressin Infusion 0.067 Unit(s)/Min (4 mL/Hr) IV Continuous <Continuous>    ===================HEMATOLOGIC/ONC ===================  Thrombocytopenia and acute blood loss anemia in setting of moderate post op mediastinal bleeding requiring Amicar post op   Noted to have firm ecchymosis around R femoral a line site appears to be improving, will continue to monitor closely.   Monitor H&H/Plts-transfuse as needed     ===================== RENAL =========================  Continue monitoring I&Os, BUN/Cr, and urine output   Replete lytes PRN. Keep K> 4 and Mg >2    ==================== GASTROINTESTINAL===================  Tolerating tube feeds, goal of Vital AF 1.5 shira @ 55cc/hr  Bowel regimen with Miralax and Senna     ascorbic acid 500 milliGRAM(s) Oral two times a day  bisacodyl Suppository 10 milliGRAM(s) Rectal once  GI prophylaxis, pantoprazole  Injectable 40 milliGRAM(s) IV Push daily  polyethylene glycol 3350 17 Gram(s) Oral daily  senna 2 Tablet(s) Oral at bedtime  sodium chloride 0.9%. 1000 milliLiter(s) (10 mL/Hr) IV Continuous <Continuous>    =======================    ENDOCRINE  =====================  Stress hyperglycemia, continue glucose control with IV insulin drip     dextrose 50% Injectable 50 milliLiter(s) IV Push every 15 minutes  dextrose 50% Injectable 25 milliLiter(s) IV Push every 15 minutes  insulin regular Infusion 3 Unit(s)/Hr (3 mL/Hr) IV Continuous <Continuous>    ========================INFECTIOUS DISEASE================  Afebrile, WBC downtrending 17.67->17.30  Continue trending WBC and monitoring fever curve   Perioperative coverage with Cefuroxime     cefuroxime  IVPB 1500 milliGRAM(s) IV Intermittent every 8 hours      Patient requires continuous monitoring with bedside rhythm monitoring, pulse oximetry monitoring, and continuous central venous and arterial pressure monitoring; and intermittent blood gas analysis.  Care plan discussed with ICU care team.    Patient remained critical, at risk for life threatening decompensation.   I have spent 35 minutes providing acute care with multiple re-evaluations throughout the evening.     By signing my name below, I, Caitlin Virk, attest that this documentation has been prepared under the direction and in the presence of SARITA Chacon   Electronically signed: Bill Lainez Devon Dougherty, PA rsonally performed the services described in this documentation. All medical record entries made by the scribe were at my direction and in my presence. I have reviewed the chart and agree that the record reflects my personal performance and is accurate and complete  Electronically signed: SARITA Chacon -13-21 @ 23:07

## 2021-10-13 NOTE — DIETITIAN INITIAL EVALUATION ADULT. - ORAL INTAKE PTA/DIET HISTORY
Unable to obtain diet information PTA.  No known food allergies per chart. No micronutrient or nutrient supplement use noted per H&P.

## 2021-10-13 NOTE — DIETITIAN INITIAL EVALUATION ADULT. - PROBLEM SELECTOR PLAN 6
Found to have torrential TR on TTE done at [facility]  Continue Lasix 40mg PO QD for now  CXR

## 2021-10-13 NOTE — DIETITIAN INITIAL EVALUATION ADULT. - ENTERAL
1. If EN is warranted, recommend initiating Vital AF 1.2 at 10 ml/hr and increase by 10 ml every 4 hours to goal rate of 55 ml/hr x 24 hours. At goal, feeds provide 1320 ml total volume, 1584 kcal, 99 gm protein and 1167 ml free water.  Feeds at goal meet 25 kcal/kg and 1.5 g/kg protein based on dosing weight of 63 kg. Defer fluid needs to team.

## 2021-10-13 NOTE — PROGRESS NOTE ADULT - SUBJECTIVE AND OBJECTIVE BOX
EDISON ROBLES  MRN-[id number]  Patient is a 71y old  Male who presents with a chief complaint of SOB/Open Heart Surg eval (12 Oct 2021 19:24)    HPI:  Patient is a 70M PMH HTN, CAD s/p CABG in 2009, Complete Heart Block s/p PPM placement, Paroxysmal Afib on coumadin, Aortic stenosis s/p Mechanical AVR who admitted to OSH with acute heart failure exacerbation/sob/LAU.  Patient states he has to stop and catch his breath when walking up stairs. On echo to have low normal EF 50-55%, Mild reduced RV fx, mod-severe MR and severe TR. Transferred to [facility] for open heart surgery with Dr. Hua. Patient endorses shortness of breath and LAU, denies chest pain, abdominal pain, fevers, chills, nausea.  (05 Oct 2021 23:42)      Surgery/Hospital Course:  10/11 MVR, TVR, LAAL, endocardial lead extraction, epicardial lead placement   10/12 TTE: no effusion   10/13: CT: no RP bleed + blood in L testes, s/p 1 pRBC overnight     Today:    REVIEW OF SYSTEMS:  Gen: No fever  EYES/ENT: No visual changes;  No vertigo or throat pain   NECK: No pain   RES:  No shortness of breath or Cough  CARD: No chest pain   GI: No abdominal pain  : No dysuria  NEURO: No weakness  SKIN: No itching, rashes     ICU Vital Signs Last 24 Hrs  T(C): 36.2 (13 Oct 2021 00:00), Max: 36.7 (12 Oct 2021 20:00)  T(F): 97.2 (13 Oct 2021 00:00), Max: 98.1 (12 Oct 2021 20:00)  HR: 91 (13 Oct 2021 07:00) (72 - 110)  BP: --  BP(mean): --  ABP: 108/52 (13 Oct 2021 07:00) (82/58 - 139/60)  ABP(mean): 70 (13 Oct 2021 07:00) (56 - 92)  RR: 17 (13 Oct 2021 07:00) (10 - 45)  SpO2: 99% (13 Oct 2021 07:00) (84% - 100%)      Physical Exam:  Gen:  NAD   CNS: non focal 	  Neck: no JVD  RES : clear , no wheezing              CVS: Regular  rhythm. Normal S1/S2  Chest: +chest tubes   Abd: Soft, non-distended. Bowel sounds present.  Skin: No rash.  Ext:  no edema    ============================I/O===========================   I&O's Detail    12 Oct 2021 07:01  -  13 Oct 2021 07:00  --------------------------------------------------------  IN:    Albumin 5%  - 250 mL: 250 mL    Bumetanide: 20 mL    Bumetanide: 100 mL    DOBUTamine: 212.8 mL    Heparin: 113.4 mL    Insulin: 44 mL    IV PiggyBack: 250 mL    Milrinone: 57.3 mL    Milrinone: 19 mL    Norepinephrine: 165.7 mL    PRBCs (Packed Red Blood Cells): 900 mL    sodium chloride 0.9%: 240 mL    Vasopressin: 130 mL  Total IN: 2502.2 mL    OUT:    Chest Tube (mL): 535 mL    Chest Tube (mL): 220 mL    Indwelling Catheter - Urethral (mL): 1170 mL  Total OUT: 1925 mL    Total NET: 577.2 mL        ============================ LABS =========================                        7.3    17.67 )-----------( 84       ( 13 Oct 2021 00:15 )             22.3     10-13    136  |  102  |  42<H>  ----------------------------<  101<H>  4.7   |  21<L>  |  1.57<H>    Ca    8.5      13 Oct 2021 00:18  Phos  5.0     10-13  Mg     2.4     10-13    TPro  5.4<L>  /  Alb  3.6  /  TBili  0.8  /  DBili  x   /  AST  74<H>  /  ALT  22  /  AlkPhos  69  10-13    LIVER FUNCTIONS - ( 13 Oct 2021 00:18 )  Alb: 3.6 g/dL / Pro: 5.4 g/dL / ALK PHOS: 69 U/L / ALT: 22 U/L / AST: 74 U/L / GGT: x           PT/INR - ( 13 Oct 2021 00:17 )   PT: 14.2 sec;   INR: 1.19 ratio         PTT - ( 13 Oct 2021 00:17 )  PTT:73.6 sec  ABG - ( 13 Oct 2021 06:11 )  pH, Arterial: 7.39  pH, Blood: x     /  pCO2: 40    /  pO2: 78    / HCO3: 24    / Base Excess: -0.7  /  SaO2: 97.9                ======================Micro/Rad/Cardio=================  CXR: Reviewed  Echo:Reviewed  ======================================================  PAST MEDICAL & SURGICAL HISTORY:  HTN (hypertension)    Complete heart block    CAD (coronary artery disease)    Paroxysmal atrial fibrillation    History of mechanical aortic valve replacement    History of pacemaker    S/P CABG (coronary artery bypass graft)      ====================ASSESSMENT ==============  MR/TR, afib s/p MVR, TVR, LAAL, endocardial lead extraction, epicardial lead placement on 10/11   Hx of tachy-arely syndrome, 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 650 milliGRAM(s) Oral every 6 hours  gabapentin 100 milliGRAM(s) Oral every 8 hours  HYDROmorphone  Injectable 0.5 milliGRAM(s) IV Push every 6 hours PRN Severe Pain (7 - 10)  oxyCODONE    IR 10 milliGRAM(s) Oral every 4 hours PRN Severe Pain (7 - 10)  oxyCODONE    IR 5 milliGRAM(s) Oral every 4 hours PRN Moderate Pain (4 - 6)    ==================== RESPIRATORY======================  Supplemental O2 via BiPAP, FiO2 80%   Encourage incentive spirometry, continue pulse ox monitoring, follow ABGs     ====================CARDIOVASCULAR==================  MR/TR, Afib s/p MVR, TVR, LAAL, endocardial lead extraction, epicardial lead placement on 10/11  Prior PPM in place, wires cut; Temporary back up pacing wires in place   Lactate peaked to 4.3 on 10/11, now 1.1, continue trending   Continue invasive hemodynamic monitoring   Pressor support with IV Vasopressin and IV Levophed   Maintain inotropic support with IV Dobutamine and IV Primacor   ASA/Lipitor for recent valve procedure     aspirin enteric coated 81 milliGRAM(s) Oral daily  atorvastatin 40 milliGRAM(s) Oral at bedtime  DOBUTamine Infusion 5 MICROgram(s)/kG/Min (9.45 mL/Hr) IV Continuous <Continuous>  milrinone Infusion 0.2 MICROgram(s)/kG/Min (3.78 mL/Hr) IV Continuous <Continuous>  norepinephrine Infusion 0.12 MICROgram(s)/kG/Min (14.2 mL/Hr) IV Continuous <Continuous>  vasopressin Infusion 0.067 Unit(s)/Min (4 mL/Hr) IV Continuous <Continuous>    ===================HEMATOLOGIC/ONC ===================  Thrombocytopenia and acute blood loss anemia in setting of moderate post op mediastinal bleeding requiring Amicar post op   Monitor H&H/Plts    Continue AC therapy with IV Heparin for valves     heparin  Infusion 630 Unit(s)/Hr (6.3 mL/Hr) IV Continuous <Continuous>    ===================== RENAL =========================  Continue monitoring urine output, I&OS, BUN/Cr   Replete lytes PRN. Keep K> 4 and Mg >2  Diuresis with IV Bumex     buMETAnide Infusion 4 mG/Hr (20 mL/Hr) IV Continuous <Continuous>    ==================== GASTROINTESTINAL===================  Tolerating consistent carb diet   Bowel regimen with Miralax and Senna     ascorbic acid 500 milliGRAM(s) Oral two times a day  bisacodyl Suppository 10 milliGRAM(s) Rectal once  GI prophylaxis, pantoprazole  Injectable 40 milliGRAM(s) IV Push daily  polyethylene glycol 3350 17 Gram(s) Oral daily  senna 2 Tablet(s) Oral at bedtime  sodium chloride 0.9%. 1000 milliLiter(s) (10 mL/Hr) IV Continuous <Continuous>    =======================    ENDOCRINE  =====================  Stress hyperglycemia, continue glucose control with IV insulin drip     dextrose 50% Injectable 50 milliLiter(s) IV Push every 15 minutes  dextrose 50% Injectable 25 milliLiter(s) IV Push every 15 minutes  insulin regular Infusion 3 Unit(s)/Hr (3 mL/Hr) IV Continuous <Continuous>    ========================INFECTIOUS DISEASE================  Afebrile, WBC downtrending 18.29->17.67   Continue trending WBC and monitoring fever curve         Patient requires continuous monitoring with bedside rhythm monitoring, pulse ox monitoring, and intermittent blood gas analysis. Care plan discussed with ICU care team. Patient remained critical and at risk for life threatening decompensation.     By signing my name below, I, Caitlin Moose, attest that this documentation has been prepared under the direction and in the presence of SARITA Nolasco   Electronically signed: Bill Lainez, 10-13-21 @ 07:55    I, Kalani Blount, personally performed the services described in this documentation. all medical record entries made by the scribe were at my direction and in my presence. I have reviewed the chart and agree that the record reflects my personal performance and is accurate and complete  Electronically signed: SARITA Nolasco        EDISON ROBLES  MRN-[id number]  Patient is a 71y old  Male who presents with a chief complaint of SOB/Open Heart Surg eval (12 Oct 2021 19:24)    HPI:  Patient is a 70M PMH HTN, CAD s/p CABG in 2009, Complete Heart Block s/p PPM placement, Paroxysmal Afib on coumadin, Aortic stenosis s/p Mechanical AVR who admitted to OSH with acute heart failure exacerbation/sob/LAU.  Patient states he has to stop and catch his breath when walking up stairs. On echo to have low normal EF 50-55%, Mild reduced RV fx, mod-severe MR and severe TR. Transferred to [facility] for open heart surgery with Dr. Hua. Patient endorses shortness of breath and LAU, denies chest pain, abdominal pain, fevers, chills, nausea.  (05 Oct 2021 23:42)      Surgery/Hospital Course:  10/11 MVR, TVR, LAAL, endocardial lead extraction, epicardial lead placement   10/12 TTE: no effusion     Today:  CT chest notable for 12.4 x 9.5 x 13.2 cm hematoma seen in R chest with mass effect on the right atrium. Plan for RTOR for chest exploration and washout in setting of hemopericardium.      REVIEW OF SYSTEMS:  Gen: No fever  EYES/ENT: No visual changes;  No vertigo or throat pain   NECK: No pain   RES:  No shortness of breath or Cough  CARD: No chest pain   GI: No abdominal pain  : No dysuria  NEURO: No weakness  SKIN: No itching, rashes     ICU Vital Signs Last 24 Hrs  T(C): 36.2 (13 Oct 2021 00:00), Max: 36.7 (12 Oct 2021 20:00)  T(F): 97.2 (13 Oct 2021 00:00), Max: 98.1 (12 Oct 2021 20:00)  HR: 91 (13 Oct 2021 07:00) (72 - 110)  BP: --  BP(mean): --  ABP: 108/52 (13 Oct 2021 07:00) (82/58 - 139/60)  ABP(mean): 70 (13 Oct 2021 07:00) (56 - 92)  RR: 17 (13 Oct 2021 07:00) (10 - 45)  SpO2: 99% (13 Oct 2021 07:00) (84% - 100%)      Physical Exam:  Gen:  NAD   CNS: non focal 	  Neck: no JVD  RES : clear , no wheezing              CVS: Regular  rhythm. Normal S1/S2  Chest: +chest tubes   Abd: Soft, non-distended. Bowel sounds present.  Skin: No rash.  Ext:  no edema    ============================I/O===========================   I&O's Detail    12 Oct 2021 07:01  -  13 Oct 2021 07:00  --------------------------------------------------------  IN:    Albumin 5%  - 250 mL: 250 mL    Bumetanide: 20 mL    Bumetanide: 100 mL    DOBUTamine: 212.8 mL    Heparin: 113.4 mL    Insulin: 44 mL    IV PiggyBack: 250 mL    Milrinone: 57.3 mL    Milrinone: 19 mL    Norepinephrine: 165.7 mL    PRBCs (Packed Red Blood Cells): 900 mL    sodium chloride 0.9%: 240 mL    Vasopressin: 130 mL  Total IN: 2502.2 mL    OUT:    Chest Tube (mL): 535 mL    Chest Tube (mL): 220 mL    Indwelling Catheter - Urethral (mL): 1170 mL  Total OUT: 1925 mL    Total NET: 577.2 mL        ============================ LABS =========================                        7.3    17.67 )-----------( 84       ( 13 Oct 2021 00:15 )             22.3     10-13    136  |  102  |  42<H>  ----------------------------<  101<H>  4.7   |  21<L>  |  1.57<H>    Ca    8.5      13 Oct 2021 00:18  Phos  5.0     10-13  Mg     2.4     10-13    TPro  5.4<L>  /  Alb  3.6  /  TBili  0.8  /  DBili  x   /  AST  74<H>  /  ALT  22  /  AlkPhos  69  10-13    LIVER FUNCTIONS - ( 13 Oct 2021 00:18 )  Alb: 3.6 g/dL / Pro: 5.4 g/dL / ALK PHOS: 69 U/L / ALT: 22 U/L / AST: 74 U/L / GGT: x           PT/INR - ( 13 Oct 2021 00:17 )   PT: 14.2 sec;   INR: 1.19 ratio         PTT - ( 13 Oct 2021 00:17 )  PTT:73.6 sec  ABG - ( 13 Oct 2021 06:11 )  pH, Arterial: 7.39  pH, Blood: x     /  pCO2: 40    /  pO2: 78    / HCO3: 24    / Base Excess: -0.7  /  SaO2: 97.9                ======================Micro/Rad/Cardio=================  CXR: Reviewed  Echo:Reviewed  ======================================================  PAST MEDICAL & SURGICAL HISTORY:  HTN (hypertension)    Complete heart block    CAD (coronary artery disease)    Paroxysmal atrial fibrillation    History of mechanical aortic valve replacement    History of pacemaker    S/P CABG (coronary artery bypass graft)      ====================ASSESSMENT ==============  MR/TR, afib s/p MVR, TVR, LAAL, endocardial lead extraction, epicardial lead placement on 10/11   Hx of tachy-arely syndrome, 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 650 milliGRAM(s) Oral every 6 hours  gabapentin 100 milliGRAM(s) Oral every 8 hours  HYDROmorphone  Injectable 0.5 milliGRAM(s) IV Push every 6 hours PRN Severe Pain (7 - 10)  oxyCODONE    IR 10 milliGRAM(s) Oral every 4 hours PRN Severe Pain (7 - 10)  oxyCODONE    IR 5 milliGRAM(s) Oral every 4 hours PRN Moderate Pain (4 - 6)    ==================== RESPIRATORY======================  Supplemental O2 via BiPAP, FiO2 80%   Encourage incentive spirometry, continue pulse ox monitoring, follow ABGs     ====================CARDIOVASCULAR==================  MR/TR, Afib s/p MVR, TVR, LAAL, endocardial lead extraction, epicardial lead placement on 10/11  Prior PPM in place, wires cut; Temporary back up pacing wires in place   CT chest notable for 12.4 x 9.5 x 13.2 cm hematoma seen in R chest with mass effect on the right atrium  Plan for RTOR for chest exploration and washout in setting of hemopericardium today   Lactate peaked to 4.3 on 10/11, now 1.1, continue trending   Continue invasive hemodynamic monitoring   Pressor support with IV Vasopressin and IV Levophed   Maintain inotropic support with IV Dobutamine and IV Primacor   ASA/Lipitor for recent valve procedure     aspirin enteric coated 81 milliGRAM(s) Oral daily  atorvastatin 40 milliGRAM(s) Oral at bedtime  DOBUTamine Infusion 5 MICROgram(s)/kG/Min (9.45 mL/Hr) IV Continuous <Continuous>  milrinone Infusion 0.2 MICROgram(s)/kG/Min (3.78 mL/Hr) IV Continuous <Continuous>  norepinephrine Infusion 0.12 MICROgram(s)/kG/Min (14.2 mL/Hr) IV Continuous <Continuous>  vasopressin Infusion 0.067 Unit(s)/Min (4 mL/Hr) IV Continuous <Continuous>    ===================HEMATOLOGIC/ONC ===================  Thrombocytopenia and acute blood loss anemia in setting of moderate post op mediastinal bleeding requiring Amicar post op   Monitor H&H/Plts    AC therapy with IV Heparin valves and hx of afib    heparin  Infusion 630 Unit(s)/Hr (6.3 mL/Hr) IV Continuous <Continuous>    ===================== RENAL =========================  Continue monitoring urine output, I&OS, BUN/Cr   Replete lytes PRN. Keep K> 4 and Mg >2  Diuresis with IV Bumex     buMETAnide Infusion 4 mG/Hr (20 mL/Hr) IV Continuous <Continuous>    ==================== GASTROINTESTINAL===================  Tolerating consistent carb diet   Bowel regimen with Miralax and Senna     ascorbic acid 500 milliGRAM(s) Oral two times a day  bisacodyl Suppository 10 milliGRAM(s) Rectal once  GI prophylaxis, pantoprazole  Injectable 40 milliGRAM(s) IV Push daily  polyethylene glycol 3350 17 Gram(s) Oral daily  senna 2 Tablet(s) Oral at bedtime  sodium chloride 0.9%. 1000 milliLiter(s) (10 mL/Hr) IV Continuous <Continuous>    =======================    ENDOCRINE  =====================  Stress hyperglycemia, continue glucose control with IV insulin drip     dextrose 50% Injectable 50 milliLiter(s) IV Push every 15 minutes  dextrose 50% Injectable 25 milliLiter(s) IV Push every 15 minutes  insulin regular Infusion 3 Unit(s)/Hr (3 mL/Hr) IV Continuous <Continuous>    ========================INFECTIOUS DISEASE================  Afebrile, WBC downtrending 18.29->17.67   Continue trending WBC and monitoring fever curve         Patient requires continuous monitoring with bedside rhythm monitoring, pulse ox monitoring, and intermittent blood gas analysis. Care plan discussed with ICU care team. Patient remained critical and at risk for life threatening decompensation.     By signing my name below, I, Caitlin Virk, attest that this documentation has been prepared under the direction and in the presence of SARITA Nolasco   Electronically signed: Miles Lainez, 10-13-21 @ 07:55    I, Kalani Blount, personally performed the services described in this documentation. all medical record entries made by the miles were at my direction and in my presence. I have reviewed the chart and agree that the record reflects my personal performance and is accurate and complete  Electronically signed: SARITA Nolasco        EDISON ROBLES  MRN-[id number]  Patient is a 71y old  Male who presents with a chief complaint of SOB/Open Heart Surg eval (12 Oct 2021 19:24)    HPI:  Patient is a 70M PMH HTN, CAD s/p CABG in 2009, Complete Heart Block s/p PPM placement, Paroxysmal Afib on coumadin, Aortic stenosis s/p Mechanical AVR who admitted to OSH with acute heart failure exacerbation/sob/LAU.  Patient states he has to stop and catch his breath when walking up stairs. On echo to have low normal EF 50-55%, Mild reduced RV fx, mod-severe MR and severe TR. Transferred to [facility] for open heart surgery with Dr. Hua. Patient endorses shortness of breath and LAU, denies chest pain, abdominal pain, fevers, chills, nausea.  (05 Oct 2021 23:42)      Surgery/Hospital Course:  10/11 MVR, TVR, LAAL, endocardial lead extraction, epicardial lead placement   10/12 TTE: no effusion     Today:  CT chest notable for 12.4 x 9.5 x 13.2 cm hematoma seen in R chest with mass effect on the right atrium. Plan for RTOR for chest exploration and washout in setting of hemopericardium.      REVIEW OF SYSTEMS:  Gen: No fever  EYES/ENT: No visual changes;  No vertigo or throat pain   NECK: No pain   RES:  No shortness of breath or Cough  CARD: +chest pain   GI: +abdominal pain  : No dysuria  NEURO: No weakness  SKIN: No itching, rashes     ICU Vital Signs Last 24 Hrs  T(C): 36.2 (13 Oct 2021 00:00), Max: 36.7 (12 Oct 2021 20:00)  T(F): 97.2 (13 Oct 2021 00:00), Max: 98.1 (12 Oct 2021 20:00)  HR: 91 (13 Oct 2021 07:00) (72 - 110)  BP: --  BP(mean): --  ABP: 108/52 (13 Oct 2021 07:00) (82/58 - 139/60)  ABP(mean): 70 (13 Oct 2021 07:00) (56 - 92)  RR: 17 (13 Oct 2021 07:00) (10 - 45)  SpO2: 99% (13 Oct 2021 07:00) (84% - 100%)      Physical Exam:  Gen:  NAD   CNS: non focal 	  Neck: no JVD  RES : poor inspiratory effort, on bipap               CVS: irregular rhythm.   Chest: +chest tubes, +PW, +PPM generator L chest wall  Abd: Soft, small ventral hernia non tender to palpation. Bowel sounds present.  Skin: No rash.  Ext:  no edema    ============================I/O===========================   I&O's Detail    12 Oct 2021 07:01  -  13 Oct 2021 07:00  --------------------------------------------------------  IN:    Albumin 5%  - 250 mL: 250 mL    Bumetanide: 20 mL    Bumetanide: 100 mL    DOBUTamine: 212.8 mL    Heparin: 113.4 mL    Insulin: 44 mL    IV PiggyBack: 250 mL    Milrinone: 57.3 mL    Milrinone: 19 mL    Norepinephrine: 165.7 mL    PRBCs (Packed Red Blood Cells): 900 mL    sodium chloride 0.9%: 240 mL    Vasopressin: 130 mL  Total IN: 2502.2 mL    OUT:    Chest Tube (mL): 535 mL    Chest Tube (mL): 220 mL    Indwelling Catheter - Urethral (mL): 1170 mL  Total OUT: 1925 mL    Total NET: 577.2 mL        ============================ LABS =========================                        7.3    17.67 )-----------( 84       ( 13 Oct 2021 00:15 )             22.3     10-13    136  |  102  |  42<H>  ----------------------------<  101<H>  4.7   |  21<L>  |  1.57<H>    Ca    8.5      13 Oct 2021 00:18  Phos  5.0     10-13  Mg     2.4     10-13    TPro  5.4<L>  /  Alb  3.6  /  TBili  0.8  /  DBili  x   /  AST  74<H>  /  ALT  22  /  AlkPhos  69  10-13    LIVER FUNCTIONS - ( 13 Oct 2021 00:18 )  Alb: 3.6 g/dL / Pro: 5.4 g/dL / ALK PHOS: 69 U/L / ALT: 22 U/L / AST: 74 U/L / GGT: x           PT/INR - ( 13 Oct 2021 00:17 )   PT: 14.2 sec;   INR: 1.19 ratio         PTT - ( 13 Oct 2021 00:17 )  PTT:73.6 sec  ABG - ( 13 Oct 2021 06:11 )  pH, Arterial: 7.39  pH, Blood: x     /  pCO2: 40    /  pO2: 78    / HCO3: 24    / Base Excess: -0.7  /  SaO2: 97.9                ======================Micro/Rad/Cardio=================  CXR: Reviewed  Echo:Reviewed  ======================================================  PAST MEDICAL & SURGICAL HISTORY:  HTN (hypertension)    Complete heart block    CAD (coronary artery disease)    Paroxysmal atrial fibrillation    History of mechanical aortic valve replacement    History of pacemaker    S/P CABG (coronary artery bypass graft)      ====================ASSESSMENT ==============  MR/TR, afib s/p MVR, TVR, LAAL, endocardial lead extraction, epicardial lead placement on 10/11   Hx of tachy-arely syndrome, 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 650 milliGRAM(s) Oral every 6 hours  gabapentin 100 milliGRAM(s) Oral every 8 hours  HYDROmorphone  Injectable 0.5 milliGRAM(s) IV Push every 6 hours PRN Severe Pain (7 - 10)  oxyCODONE    IR 10 milliGRAM(s) Oral every 4 hours PRN Severe Pain (7 - 10)  oxyCODONE    IR 5 milliGRAM(s) Oral every 4 hours PRN Moderate Pain (4 - 6)    ==================== RESPIRATORY======================  Supplemental O2 via BiPAP, FiO2 80%   Encourage incentive spirometry, continue pulse ox monitoring, follow ABGs   Will keep intubated on return from OR until hemodynamically stable. Sats low this morning, will plan to recruit with vent and improve oxygenation.    ====================CARDIOVASCULAR==================  MR/TR, Afib s/p MVR, TVR, LAAL, endocardial lead extraction, epicardial lead placement on 10/11  Prior PPM in place, wires cut; Temporary back up pacing wires in place   CT chest notable for 12.4 x 9.5 x 13.2 cm hematoma seen in R chest with mass effect on the right atrium  Plan for RTOR for chest exploration and washout in setting of hemopericardium today   Lactate peaked to 4.3 on 10/11, now 1.1, continue trending   Continue invasive hemodynamic monitoring   Pressor support with IV Vasopressin and IV Levophed   Maintain inotropic support with IV Dobutamine and IV Primacor   ASA/Lipitor for recent valve procedure   Plan to wean inotropes as tolerated post -procedure    aspirin enteric coated 81 milliGRAM(s) Oral daily  atorvastatin 40 milliGRAM(s) Oral at bedtime  DOBUTamine Infusion 5 MICROgram(s)/kG/Min (9.45 mL/Hr) IV Continuous <Continuous>  milrinone Infusion 0.2 MICROgram(s)/kG/Min (3.78 mL/Hr) IV Continuous <Continuous>  norepinephrine Infusion 0.12 MICROgram(s)/kG/Min (14.2 mL/Hr) IV Continuous <Continuous>  vasopressin Infusion 0.067 Unit(s)/Min (4 mL/Hr) IV Continuous <Continuous>    ===================HEMATOLOGIC/ONC ===================  Thrombocytopenia and acute blood loss anemia in setting of moderate post op mediastinal bleeding requiring Amicar post op   Monitor H&H/Plts    AC therapy with IV Heparin valves and hx of afib > on hold for bleeding/OR    heparin  Infusion 630 Unit(s)/Hr (6.3 mL/Hr) IV Continuous <Continuous>    ===================== RENAL =========================  Continue monitoring urine output, I&OS, BUN/Cr   Replete lytes PRN. Keep K> 4 and Mg >2  Diuresis with IV Bumex > will stop     buMETAnide Infusion 4 mG/Hr (20 mL/Hr) IV Continuous <Continuous>    ==================== GASTROINTESTINAL===================  Tolerating consistent carb diet > will start TF while intubated  Bowel regimen with Miralax and Senna     ascorbic acid 500 milliGRAM(s) Oral two times a day  bisacodyl Suppository 10 milliGRAM(s) Rectal once  GI prophylaxis, pantoprazole  Injectable 40 milliGRAM(s) IV Push daily  polyethylene glycol 3350 17 Gram(s) Oral daily  senna 2 Tablet(s) Oral at bedtime  sodium chloride 0.9%. 1000 milliLiter(s) (10 mL/Hr) IV Continuous <Continuous>    =======================    ENDOCRINE  =====================  Stress hyperglycemia, continue glucose control with IV insulin drip     dextrose 50% Injectable 50 milliLiter(s) IV Push every 15 minutes  dextrose 50% Injectable 25 milliLiter(s) IV Push every 15 minutes  insulin regular Infusion 3 Unit(s)/Hr (3 mL/Hr) IV Continuous <Continuous>    ========================INFECTIOUS DISEASE================  Afebrile, WBC downtrending 18.29->17.67   Continue trending WBC and monitoring fever curve   periop abx        Patient requires continuous monitoring with bedside rhythm monitoring, pulse ox monitoring, and intermittent blood gas analysis. Care plan discussed with ICU care team. Patient remained critical and at risk for life threatening decompensation.     By signing my name below, I, Caitlin Virk, attest that this documentation has been prepared under the direction and in the presence of SARITA Nolasco   Electronically signed: Bill Lainez, 10-13-21 @ 07:55    I, Kalani Blount, personally performed the services described in this documentation. all medical record entries made by the scribe were at my direction and in my presence. I have reviewed the chart and agree that the record reflects my personal performance and is accurate and complete  Electronically signed: SARITA Nolasco

## 2021-10-13 NOTE — PRE-ANESTHESIA EVALUATION ADULT - NSDENTALSD_ENT_ALL_CORE
Extremely poor dentition, many missing and cracked teeth/loose teeth/missing teeth
poor dentition/missing teeth

## 2021-10-14 LAB
ALBUMIN SERPL ELPH-MCNC: 3.5 G/DL — SIGNIFICANT CHANGE UP (ref 3.3–5)
ALP SERPL-CCNC: 77 U/L — SIGNIFICANT CHANGE UP (ref 40–120)
ALT FLD-CCNC: 22 U/L — SIGNIFICANT CHANGE UP (ref 10–45)
ANION GAP SERPL CALC-SCNC: 15 MMOL/L — SIGNIFICANT CHANGE UP (ref 5–17)
AST SERPL-CCNC: 53 U/L — HIGH (ref 10–40)
BASE EXCESS BLDV CALC-SCNC: -0.1 MMOL/L — SIGNIFICANT CHANGE UP (ref -2–2)
BILIRUB SERPL-MCNC: 1.2 MG/DL — SIGNIFICANT CHANGE UP (ref 0.2–1.2)
BUN SERPL-MCNC: 50 MG/DL — HIGH (ref 7–23)
CA-I SERPL-SCNC: 1.18 MMOL/L — SIGNIFICANT CHANGE UP (ref 1.15–1.33)
CALCIUM SERPL-MCNC: 8.7 MG/DL — SIGNIFICANT CHANGE UP (ref 8.4–10.5)
CHLORIDE BLDV-SCNC: 104 MMOL/L — SIGNIFICANT CHANGE UP (ref 96–108)
CHLORIDE SERPL-SCNC: 99 MMOL/L — SIGNIFICANT CHANGE UP (ref 96–108)
CO2 BLDV-SCNC: 27 MMOL/L — HIGH (ref 22–26)
CO2 SERPL-SCNC: 21 MMOL/L — LOW (ref 22–31)
CREAT SERPL-MCNC: 1.8 MG/DL — HIGH (ref 0.5–1.3)
GAS PNL BLDA: SIGNIFICANT CHANGE UP
GAS PNL BLDV: 134 MMOL/L — LOW (ref 136–145)
GAS PNL BLDV: SIGNIFICANT CHANGE UP
GAS PNL BLDV: SIGNIFICANT CHANGE UP
GLUCOSE BLDC GLUCOMTR-MCNC: 114 MG/DL — HIGH (ref 70–99)
GLUCOSE BLDC GLUCOMTR-MCNC: 136 MG/DL — HIGH (ref 70–99)
GLUCOSE BLDC GLUCOMTR-MCNC: 136 MG/DL — HIGH (ref 70–99)
GLUCOSE BLDC GLUCOMTR-MCNC: 146 MG/DL — HIGH (ref 70–99)
GLUCOSE BLDC GLUCOMTR-MCNC: 150 MG/DL — HIGH (ref 70–99)
GLUCOSE BLDC GLUCOMTR-MCNC: 159 MG/DL — HIGH (ref 70–99)
GLUCOSE BLDV-MCNC: 115 MG/DL — HIGH (ref 70–99)
GLUCOSE SERPL-MCNC: 126 MG/DL — HIGH (ref 70–99)
HCO3 BLDV-SCNC: 25 MMOL/L — SIGNIFICANT CHANGE UP (ref 22–29)
HCT VFR BLD CALC: 22.9 % — LOW (ref 39–50)
HCT VFR BLDA CALC: 22 % — LOW (ref 39–51)
HGB BLD CALC-MCNC: 7.3 G/DL — LOW (ref 12.6–17.4)
HGB BLD-MCNC: 7.7 G/DL — LOW (ref 13–17)
HOROWITZ INDEX BLDV+IHG-RTO: 50 — SIGNIFICANT CHANGE UP
LACTATE BLDV-MCNC: 1 MMOL/L — SIGNIFICANT CHANGE UP (ref 0.7–2)
MAGNESIUM SERPL-MCNC: 2.6 MG/DL — SIGNIFICANT CHANGE UP (ref 1.6–2.6)
MCHC RBC-ENTMCNC: 26.5 PG — LOW (ref 27–34)
MCHC RBC-ENTMCNC: 33.6 GM/DL — SIGNIFICANT CHANGE UP (ref 32–36)
MCV RBC AUTO: 78.7 FL — LOW (ref 80–100)
NRBC # BLD: 0 /100 WBCS — SIGNIFICANT CHANGE UP (ref 0–0)
PCO2 BLDV: 45 MMHG — SIGNIFICANT CHANGE UP (ref 42–55)
PH BLDV: 7.36 — SIGNIFICANT CHANGE UP (ref 7.32–7.43)
PHOSPHATE SERPL-MCNC: 5.5 MG/DL — HIGH (ref 2.5–4.5)
PLATELET # BLD AUTO: 113 K/UL — LOW (ref 150–400)
PO2 BLDV: 39 MMHG — SIGNIFICANT CHANGE UP (ref 25–45)
POTASSIUM BLDV-SCNC: 4.8 MMOL/L — SIGNIFICANT CHANGE UP (ref 3.5–5.1)
POTASSIUM SERPL-MCNC: 5.1 MMOL/L — SIGNIFICANT CHANGE UP (ref 3.5–5.3)
POTASSIUM SERPL-SCNC: 5.1 MMOL/L — SIGNIFICANT CHANGE UP (ref 3.5–5.3)
PROT SERPL-MCNC: 5.4 G/DL — LOW (ref 6–8.3)
RBC # BLD: 2.91 M/UL — LOW (ref 4.2–5.8)
RBC # FLD: 21.3 % — HIGH (ref 10.3–14.5)
SAO2 % BLDV: 68.2 % — SIGNIFICANT CHANGE UP (ref 67–88)
SODIUM SERPL-SCNC: 135 MMOL/L — SIGNIFICANT CHANGE UP (ref 135–145)
WBC # BLD: 17.48 K/UL — HIGH (ref 3.8–10.5)
WBC # FLD AUTO: 17.48 K/UL — HIGH (ref 3.8–10.5)

## 2021-10-14 PROCEDURE — 93010 ELECTROCARDIOGRAM REPORT: CPT

## 2021-10-14 PROCEDURE — 99291 CRITICAL CARE FIRST HOUR: CPT | Mod: 24

## 2021-10-14 PROCEDURE — 71045 X-RAY EXAM CHEST 1 VIEW: CPT | Mod: 26

## 2021-10-14 RX ORDER — INSULIN LISPRO 100/ML
VIAL (ML) SUBCUTANEOUS
Refills: 0 | Status: DISCONTINUED | OUTPATIENT
Start: 2021-10-14 | End: 2021-10-19

## 2021-10-14 RX ORDER — PANTOPRAZOLE SODIUM 20 MG/1
40 TABLET, DELAYED RELEASE ORAL
Refills: 0 | Status: DISCONTINUED | OUTPATIENT
Start: 2021-10-14 | End: 2021-10-19

## 2021-10-14 RX ORDER — INSULIN LISPRO 100/ML
VIAL (ML) SUBCUTANEOUS AT BEDTIME
Refills: 0 | Status: DISCONTINUED | OUTPATIENT
Start: 2021-10-14 | End: 2021-10-19

## 2021-10-14 RX ORDER — HEPARIN SODIUM 5000 [USP'U]/ML
5000 INJECTION INTRAVENOUS; SUBCUTANEOUS EVERY 8 HOURS
Refills: 0 | Status: DISCONTINUED | OUTPATIENT
Start: 2021-10-14 | End: 2021-10-16

## 2021-10-14 RX ADMIN — Medication 100 MILLIGRAM(S): at 21:36

## 2021-10-14 RX ADMIN — OXYCODONE HYDROCHLORIDE 10 MILLIGRAM(S): 5 TABLET ORAL at 11:00

## 2021-10-14 RX ADMIN — FENTANYL CITRATE 25 MICROGRAM(S): 50 INJECTION INTRAVENOUS at 07:25

## 2021-10-14 RX ADMIN — GABAPENTIN 100 MILLIGRAM(S): 400 CAPSULE ORAL at 21:34

## 2021-10-14 RX ADMIN — HEPARIN SODIUM 5000 UNIT(S): 5000 INJECTION INTRAVENOUS; SUBCUTANEOUS at 21:34

## 2021-10-14 RX ADMIN — Medication 650 MILLIGRAM(S): at 11:41

## 2021-10-14 RX ADMIN — Medication 100 MILLIGRAM(S): at 05:14

## 2021-10-14 RX ADMIN — HEPARIN SODIUM 5000 UNIT(S): 5000 INJECTION INTRAVENOUS; SUBCUTANEOUS at 14:20

## 2021-10-14 RX ADMIN — Medication 500 MILLIGRAM(S): at 17:08

## 2021-10-14 RX ADMIN — MUPIROCIN 1 APPLICATION(S): 20 OINTMENT TOPICAL at 17:08

## 2021-10-14 RX ADMIN — OXYCODONE HYDROCHLORIDE 10 MILLIGRAM(S): 5 TABLET ORAL at 00:00

## 2021-10-14 RX ADMIN — Medication 100 MILLIGRAM(S): at 14:20

## 2021-10-14 RX ADMIN — OXYCODONE HYDROCHLORIDE 10 MILLIGRAM(S): 5 TABLET ORAL at 20:16

## 2021-10-14 RX ADMIN — OXYCODONE HYDROCHLORIDE 10 MILLIGRAM(S): 5 TABLET ORAL at 10:00

## 2021-10-14 RX ADMIN — PANTOPRAZOLE SODIUM 40 MILLIGRAM(S): 20 TABLET, DELAYED RELEASE ORAL at 11:43

## 2021-10-14 RX ADMIN — MUPIROCIN 1 APPLICATION(S): 20 OINTMENT TOPICAL at 05:09

## 2021-10-14 RX ADMIN — CHLORHEXIDINE GLUCONATE 15 MILLILITER(S): 213 SOLUTION TOPICAL at 05:08

## 2021-10-14 RX ADMIN — Medication 650 MILLIGRAM(S): at 00:00

## 2021-10-14 RX ADMIN — Medication 81 MILLIGRAM(S): at 11:41

## 2021-10-14 RX ADMIN — FENTANYL CITRATE 25 MICROGRAM(S): 50 INJECTION INTRAVENOUS at 07:40

## 2021-10-14 RX ADMIN — Medication 650 MILLIGRAM(S): at 12:41

## 2021-10-14 RX ADMIN — ATORVASTATIN CALCIUM 40 MILLIGRAM(S): 80 TABLET, FILM COATED ORAL at 21:34

## 2021-10-14 RX ADMIN — GABAPENTIN 100 MILLIGRAM(S): 400 CAPSULE ORAL at 14:19

## 2021-10-14 RX ADMIN — CHLORHEXIDINE GLUCONATE 1 APPLICATION(S): 213 SOLUTION TOPICAL at 11:41

## 2021-10-14 RX ADMIN — POLYETHYLENE GLYCOL 3350 17 GRAM(S): 17 POWDER, FOR SOLUTION ORAL at 11:42

## 2021-10-14 RX ADMIN — Medication 4.73 MICROGRAM(S)/KG/MIN: at 21:36

## 2021-10-14 RX ADMIN — SENNA PLUS 2 TABLET(S): 8.6 TABLET ORAL at 21:34

## 2021-10-14 NOTE — AIRWAY REMOVAL NOTE  ADULT & PEDS - ARTIFICAL AIRWAY REMOVAL COMMENTS
Written order for extubation verified. The patient was identified by full name and birth date compared to the identification band. Present during the procedure was MEENA FELIX
Written order for extubation verified. The patient was identified by full name and birth date compared to the identification band. Pt stable on HFNC 50%/50L with no signs of resp distress or stridor noted. Present during the procedure was Janine MagañaRN)

## 2021-10-14 NOTE — PROGRESS NOTE ADULT - SUBJECTIVE AND OBJECTIVE BOX
EDISON ROBLES  MRN-[id number]  Patient is a 71y old  Male who presents with a chief complaint of SOB/Open Heart Surg eval (13 Oct 2021 23:06)    HPI:  Patient is a 70M PMH HTN, CAD s/p CABG in 2009, Complete Heart Block s/p PPM placement, Paroxysmal Afib on coumadin, Aortic stenosis s/p Mechanical AVR who admitted to OSH with acute heart failure exacerbation/sob/LAU.  Patient states he has to stop and catch his breath when walking up stairs. On echo to have low normal EF 50-55%, Mild reduced RV fx, mod-severe MR and severe TR. Transferred to [facility] for open heart surgery with Dr. Hua. Patient endorses shortness of breath and LAU, denies chest pain, abdominal pain, fevers, chills, nausea.  (05 Oct 2021 23:42)      Surgery/Hospital Course:  10/11 MVR, TVR, LAAL, endocardial lead extraction, epicardial lead placement   10/12 TTE: no effusion   10/13 CT: no RP bleed + blood in L testes 1 PRBC OVN, TTE: 12.4 x 9.5 x 13.2 cm hematoma is seen in the right chest with mass effect on the right atrium. The hematoma appears to be in the pericardial space. The pericardial drain is displaced laterally from the hematoma. 1 small pleural effusion with patchy opacities, suggestive of pulmonary edema. Small right groin hematoma. Dilated IVC due to right atrial compression from the hematoma. RTOR for washout.     Today:  ·	Extubated early AM   ·	Given 1U of pRBCs overnight, continue to monitor     REVIEW OF SYSTEMS:  Gen: No fever  EYES/ENT: No visual changes;  No vertigo or throat pain   NECK: No pain   RES:  No shortness of breath or Cough  CARD: No chest pain   GI: No abdominal pain  : No dysuria  NEURO: No weakness  SKIN: No itching, rashes     ICU Vital Signs Last 24 Hrs  T(C): 36.5 (14 Oct 2021 12:00), Max: 36.5 (14 Oct 2021 12:00)  T(F): 97.7 (14 Oct 2021 12:00), Max: 97.7 (14 Oct 2021 12:00)  HR: 62 (14 Oct 2021 13:00) (56 - 94)  BP: --  BP(mean): --  ABP: 101/46 (14 Oct 2021 13:00) (95/48 - 131/60)  ABP(mean): 65 (14 Oct 2021 13:00) (60 - 87)  RR: 13 (14 Oct 2021 13:00) (1 - 46)  SpO2: 92% (14 Oct 2021 13:00) (86% - 100%)      Physical Exam:  Gen:  Awake, alert   CNS: non focal 	  Neck: no JVD  RES : clear , no wheezing              Chest: +chest tubes   CVS: Regular  rhythm. Normal S1/S2  Abd: Soft, non-distended. Bowel sounds present.  Skin: No rash.  Ext:  no edema    ============================I/O===========================   I&O's Detail    13 Oct 2021 07:01  -  14 Oct 2021 07:00  --------------------------------------------------------  IN:    Dexmedetomidine: 200.4 mL    DOBUTamine: 98.8 mL    Heparin: 6.3 mL    Insulin: 17 mL    IV PiggyBack: 200 mL    Milrinone: 10.9 mL    Norepinephrine: 132.5 mL    sodium chloride 0.9%: 190 mL    Vasopressin: 64 mL    Vital1.0: 190 mL  Total IN: 1109.9 mL    OUT:    Bumetanide: 0 mL    Chest Tube (mL): 255 mL    Chest Tube (mL): 610 mL    Indwelling Catheter - Urethral (mL): 1930 mL  Total OUT: 2795 mL    Total NET: -1685.1 mL      14 Oct 2021 07:01  -  14 Oct 2021 13:20  --------------------------------------------------------  IN:    DOBUTamine: 28.2 mL    Norepinephrine: 39 mL    sodium chloride 0.9%: 60 mL  Total IN: 127.2 mL    OUT:    Chest Tube (mL): 20 mL    Chest Tube (mL): 120 mL    Indwelling Catheter - Urethral (mL): 695 mL    Vasopressin: 0 mL    Vital1.0: 0 mL  Total OUT: 835 mL    Total NET: -707.8 mL        ============================ LABS =========================                        7.7    17.48 )-----------( 113      ( 14 Oct 2021 00:21 )             22.9     10-14    135  |  99  |  50<H>  ----------------------------<  126<H>  5.1   |  21<L>  |  1.80<H>    Ca    8.7      14 Oct 2021 00:26  Phos  5.5     10-14  Mg     2.6     10-14    TPro  5.4<L>  /  Alb  3.5  /  TBili  1.2  /  DBili  x   /  AST  53<H>  /  ALT  22  /  AlkPhos  77  10-14    LIVER FUNCTIONS - ( 14 Oct 2021 00:26 )  Alb: 3.5 g/dL / Pro: 5.4 g/dL / ALK PHOS: 77 U/L / ALT: 22 U/L / AST: 53 U/L / GGT: x           PT/INR - ( 13 Oct 2021 13:37 )   PT: 13.1 sec;   INR: 1.10 ratio         PTT - ( 13 Oct 2021 13:37 )  PTT:36.8 sec  ABG - ( 14 Oct 2021 05:56 )  pH, Arterial: 7.43  pH, Blood: x     /  pCO2: 35    /  pO2: 81    / HCO3: 23    / Base Excess: -0.9  /  SaO2: 97.6                ======================Micro/Rad/Cardio=================  CXR: Reviewed  Echo:Reviewed  ======================================================  PAST MEDICAL & SURGICAL HISTORY:  HTN (hypertension)    Complete heart block    CAD (coronary artery disease)    Paroxysmal atrial fibrillation    History of mechanical aortic valve replacement    History of pacemaker    S/P CABG (coronary artery bypass graft)      ====================ASSESSMENT ==============  MR/TR, afib s/p MVR, TVR, LAAL, endocardial lead extraction, epicardial lead placement on 10/11   Pericardial effusion with cardiac tamponade, s/p RTOR for sternotomy/washout on 10/13   Hx of tachy-arely syndrome, 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     acetaminophen   Tablet .. 650 milliGRAM(s) Oral every 6 hours PRN Mild Pain (1 - 3)  fentaNYL    Injectable 25 MICROGram(s) IV Push every 3 hours PRN Severe Pain (7 - 10)  gabapentin 100 milliGRAM(s) Oral every 8 hours  oxyCODONE    IR 5 milliGRAM(s) Oral every 4 hours PRN Moderate Pain (4 - 6)  oxyCODONE    IR 10 milliGRAM(s) Oral every 4 hours PRN Severe Pain (7 - 10)    ==================== RESPIRATORY======================  Receiving supplemental O2 therapy with HFNC  Continue to monitor RR, breathing pattern, pulse ox, and ABG's.  Encourage incentive spirometry.     ====================CARDIOVASCULAR==================  MR/TR, Afib s/p MVR, TVR, LAAL, endocardial lead extraction, epicardial lead placement on 10/11  Prior PPM in place, wires cut; Temporary back up pacing wires in place   CT chest notable for 12.4 x 9.5 x 13.2 cm hematoma seen in R chest with mass effect on the right atrium  s/p RTOR for sternotomy, washout on 10/13   Lactate peaked to 4.3 on 10/11, now 0.8, continue trending   Continue invasive hemodynamic monitoring   Pressor support with IV Vasopressin and IV Levophed   Inotropic support w/ Dobutamine for RV support  ASA/Lipitor for recent valve procedure     DOBUTamine Infusion 2.5 MICROgram(s)/kG/Min (4.73 mL/Hr) IV Continuous <Continuous>  norepinephrine Infusion 0.12 MICROgram(s)/kG/Min (14.2 mL/Hr) IV Continuous <Continuous>  atorvastatin 40 milliGRAM(s) Oral at bedtime  aspirin enteric coated 81 milliGRAM(s) Oral daily  vasopressin Infusion 0.067 Unit(s)/Min (4 mL/Hr) IV Continuous <Continuous>  ===================HEMATOLOGIC/ONC ===================  Thrombocytopenia and acute blood loss anemia in setting of moderate post op mediastinal bleeding requiring Amicar post op   Noted to have firm ecchymosis around R femoral a line site appears to be improving, will continue to monitor closely.   Monitor H&H/Plts-transfuse as needed   Continue Heparin for venous thromboembolism prophylaxis.     heparin   Injectable 5000 Unit(s) SubCutaneous every 8 hours  ===================== RENAL =========================  Continue to monitor I/Os, BUN/Creatinine, and urine output  Replete lytes PRN. Keep K> 4 and Mg >2     ==================== GASTROINTESTINAL===================  Tolerating tube feeds, goal of Vital AF 1.5 shira @ 55cc/hr  Bowel regimen with Miralax and Senna   Continue Protonix for stress ulcer prophylaxis.     ascorbic acid 500 milliGRAM(s) Oral two times a day  bisacodyl Suppository 10 milliGRAM(s) Rectal once  pantoprazole    Tablet 40 milliGRAM(s) Oral before breakfast  polyethylene glycol 3350 17 Gram(s) Oral daily  senna 2 Tablet(s) Oral at bedtime  sodium chloride 0.9%. 1000 milliLiter(s) (10 mL/Hr) IV Continuous <Continuous>  =======================    ENDOCRINE  =====================  Stress hyperglycemia, continue glucose control with IV insulin drip     dextrose 50% Injectable 50 milliLiter(s) IV Push every 15 minutes  dextrose 50% Injectable 25 milliLiter(s) IV Push every 15 minutes  insulin lispro (ADMELOG) corrective regimen sliding scale   SubCutaneous three times a day before meals  insulin lispro (ADMELOG) corrective regimen sliding scale   SubCutaneous at bedtime  ========================INFECTIOUS DISEASE================  Afebrile, WBC downtrending 17.30 --> 17.48  Continue trending WBC and monitoring fever curve   Perioperative coverage with Cefuroxime     cefuroxime  IVPB 1500 milliGRAM(s) IV Intermittent every 8 hours      Patient requires continuous monitoring with bedside rhythm monitoring, pulse ox monitoring, and intermittent blood gas analysis. Care plan discussed with ICU care team. Patient remained critical and at risk for life threatening decompensation.     By signing my name below, I, Albina Marr, attest that this documentation has been prepared under the direction and in the presence of SARITA Carbajal   Electronically signed: Albina Marr Scribe, 10-14-21 @ 13:21    I, SARITA Carbajal  , personally performed the services described in this documentation. all medical record entries made by the scribe were at my direction and in my presence. I have reviewed the chart and agree that the record reflects my personal performance and is accurate and complete  Electronically signed: SARITA Carbajal        EDISON ROBLES  MRN-[id number]  Patient is a 71y old  Male who presents with a chief complaint of SOB/Open Heart Surg eval (13 Oct 2021 23:06)    HPI:  Patient is a 70M PMH HTN, CAD s/p CABG in 2009, Complete Heart Block s/p PPM placement, Paroxysmal Afib on coumadin, Aortic stenosis s/p Mechanical AVR who admitted to OSH with acute heart failure exacerbation/sob/LAU.  Patient states he has to stop and catch his breath when walking up stairs. On echo to have low normal EF 50-55%, Mild reduced RV fx, mod-severe MR and severe TR. Transferred to [facility] for open heart surgery with Dr. Hua. Patient endorses shortness of breath and LAU, denies chest pain, abdominal pain, fevers, chills, nausea.  (05 Oct 2021 23:42)      Surgery/Hospital Course:  10/11 MVR, TVR, LAAL, endocardial lead extraction, epicardial lead placement   10/12 TTE: no effusion   10/13 CT: no RP bleed + blood in L testes 1 PRBC OVN, TTE: 12.4 x 9.5 x 13.2 cm hematoma is seen in the right chest with mass effect on the right atrium. The hematoma appears to be in the pericardial space. The pericardial drain is displaced laterally from the hematoma. 1 small pleural effusion with patchy opacities, suggestive of pulmonary edema. Small right groin hematoma. Dilated IVC due to right atrial compression from the hematoma. RTOR for washout.     Today:  ·	Extubated early AM   ·	Given 1U of pRBCs overnight, continue to monitor     REVIEW OF SYSTEMS:  Gen: No fever  EYES/ENT: No visual changes;  No vertigo or throat pain   NECK: No pain   RES:  No shortness of breath or Cough  CARD: No chest pain   GI: No abdominal pain  : No dysuria  NEURO: No weakness  SKIN: No itching, rashes     ICU Vital Signs Last 24 Hrs  T(C): 36.5 (14 Oct 2021 12:00), Max: 36.5 (14 Oct 2021 12:00)  T(F): 97.7 (14 Oct 2021 12:00), Max: 97.7 (14 Oct 2021 12:00)  HR: 62 (14 Oct 2021 13:00) (56 - 94)  BP: --  BP(mean): --  ABP: 101/46 (14 Oct 2021 13:00) (95/48 - 131/60)  ABP(mean): 65 (14 Oct 2021 13:00) (60 - 87)  RR: 13 (14 Oct 2021 13:00) (1 - 46)  SpO2: 92% (14 Oct 2021 13:00) (86% - 100%)      Physical Exam:  Gen:  Awake, alert   CNS: non focal 	  Neck: no JVD  RES : clear , no wheezing              Chest: +chest tubes   CVS: irregular rhythm. Normal S1/S2  Abd: Soft, non-distended. Bowel sounds present.  Skin: No rash.  Ext:  no edema, R groin hematoma improving, area soft to palpation, + ecchymosis    ============================I/O===========================   I&O's Detail    13 Oct 2021 07:01  -  14 Oct 2021 07:00  --------------------------------------------------------  IN:    Dexmedetomidine: 200.4 mL    DOBUTamine: 98.8 mL    Heparin: 6.3 mL    Insulin: 17 mL    IV PiggyBack: 200 mL    Milrinone: 10.9 mL    Norepinephrine: 132.5 mL    sodium chloride 0.9%: 190 mL    Vasopressin: 64 mL    Vital1.0: 190 mL  Total IN: 1109.9 mL    OUT:    Bumetanide: 0 mL    Chest Tube (mL): 255 mL    Chest Tube (mL): 610 mL    Indwelling Catheter - Urethral (mL): 1930 mL  Total OUT: 2795 mL    Total NET: -1685.1 mL      14 Oct 2021 07:01  -  14 Oct 2021 13:20  --------------------------------------------------------  IN:    DOBUTamine: 28.2 mL    Norepinephrine: 39 mL    sodium chloride 0.9%: 60 mL  Total IN: 127.2 mL    OUT:    Chest Tube (mL): 20 mL    Chest Tube (mL): 120 mL    Indwelling Catheter - Urethral (mL): 695 mL    Vasopressin: 0 mL    Vital1.0: 0 mL  Total OUT: 835 mL    Total NET: -707.8 mL        ============================ LABS =========================                        7.7    17.48 )-----------( 113      ( 14 Oct 2021 00:21 )             22.9     10-14    135  |  99  |  50<H>  ----------------------------<  126<H>  5.1   |  21<L>  |  1.80<H>    Ca    8.7      14 Oct 2021 00:26  Phos  5.5     10-14  Mg     2.6     10-14    TPro  5.4<L>  /  Alb  3.5  /  TBili  1.2  /  DBili  x   /  AST  53<H>  /  ALT  22  /  AlkPhos  77  10-14    LIVER FUNCTIONS - ( 14 Oct 2021 00:26 )  Alb: 3.5 g/dL / Pro: 5.4 g/dL / ALK PHOS: 77 U/L / ALT: 22 U/L / AST: 53 U/L / GGT: x           PT/INR - ( 13 Oct 2021 13:37 )   PT: 13.1 sec;   INR: 1.10 ratio         PTT - ( 13 Oct 2021 13:37 )  PTT:36.8 sec  ABG - ( 14 Oct 2021 05:56 )  pH, Arterial: 7.43  pH, Blood: x     /  pCO2: 35    /  pO2: 81    / HCO3: 23    / Base Excess: -0.9  /  SaO2: 97.6                ======================Micro/Rad/Cardio=================  CXR: Reviewed  Echo:Reviewed  ======================================================  PAST MEDICAL & SURGICAL HISTORY:  HTN (hypertension)    Complete heart block    CAD (coronary artery disease)    Paroxysmal atrial fibrillation    History of mechanical aortic valve replacement    History of pacemaker    S/P CABG (coronary artery bypass graft)      ====================ASSESSMENT ==============  MR/TR, afib s/p MVR, TVR, LAAL, endocardial lead extraction, epicardial lead placement on 10/11   Pericardial effusion with cardiac tamponade, s/p RTOR for sternotomy/washout on 10/13   Hx of tachy-arely syndrome, 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     acetaminophen   Tablet .. 650 milliGRAM(s) Oral every 6 hours PRN Mild Pain (1 - 3)  fentaNYL    Injectable 25 MICROGram(s) IV Push every 3 hours PRN Severe Pain (7 - 10)  gabapentin 100 milliGRAM(s) Oral every 8 hours  oxyCODONE    IR 5 milliGRAM(s) Oral every 4 hours PRN Moderate Pain (4 - 6)  oxyCODONE    IR 10 milliGRAM(s) Oral every 4 hours PRN Severe Pain (7 - 10)    ==================== RESPIRATORY======================  Receiving supplemental O2 therapy with HFNC > wean as tolerated  Continue to monitor RR, breathing pattern, pulse ox, and ABG's.  Encourage incentive spirometry.     ====================CARDIOVASCULAR==================  MR/TR, Afib s/p MVR, TVR, LAAL, endocardial lead extraction, epicardial lead placement on 10/11  Prior PPM in place, wires cut; Temporary back up pacing wires in place   CT chest notable for 12.4 x 9.5 x 13.2 cm hematoma seen in R chest with mass effect on the right atrium  s/p RTOR for sternotomy, washout on 10/13   Lactate peaked to 4.3 on 10/11, now 0.8, continue trending   Continue invasive hemodynamic monitoring   Pressor support with IV Vasopressin and IV Levophed > wean as tolerated  Inotropic support w/ Dobutamine for RV support  ASA/Lipitor for recent valve procedure     DOBUTamine Infusion 2.5 MICROgram(s)/kG/Min (4.73 mL/Hr) IV Continuous <Continuous>  norepinephrine Infusion 0.12 MICROgram(s)/kG/Min (14.2 mL/Hr) IV Continuous <Continuous>  atorvastatin 40 milliGRAM(s) Oral at bedtime  aspirin enteric coated 81 milliGRAM(s) Oral daily  vasopressin Infusion 0.067 Unit(s)/Min (4 mL/Hr) IV Continuous <Continuous>  ===================HEMATOLOGIC/ONC ===================  Thrombocytopenia and acute blood loss anemia in setting of moderate post op mediastinal bleeding requiring Amicar post op   Noted to have firm ecchymosis around R femoral a line site appears to be improving, will continue to monitor closely.   Monitor H&H/Plts-transfuse as needed   Continue Heparin for venous thromboembolism prophylaxis.     heparin   Injectable 5000 Unit(s) SubCutaneous every 8 hours  ===================== RENAL =========================  Continue to monitor I/Os, BUN/Creatinine, and urine output  Replete lytes PRN. Keep K> 4 and Mg >2     ==================== GASTROINTESTINAL===================  Switching to PO diet  Bowel regimen with Miralax and Senna   Continue Protonix for stress ulcer prophylaxis.     ascorbic acid 500 milliGRAM(s) Oral two times a day  bisacodyl Suppository 10 milliGRAM(s) Rectal once  pantoprazole    Tablet 40 milliGRAM(s) Oral before breakfast  polyethylene glycol 3350 17 Gram(s) Oral daily  senna 2 Tablet(s) Oral at bedtime  sodium chloride 0.9%. 1000 milliLiter(s) (10 mL/Hr) IV Continuous <Continuous>  =======================    ENDOCRINE  =====================  Stress hyperglycemia, continue glucose control with IV insulin drip     dextrose 50% Injectable 50 milliLiter(s) IV Push every 15 minutes  dextrose 50% Injectable 25 milliLiter(s) IV Push every 15 minutes  insulin lispro (ADMELOG) corrective regimen sliding scale   SubCutaneous three times a day before meals  insulin lispro (ADMELOG) corrective regimen sliding scale   SubCutaneous at bedtime  ========================INFECTIOUS DISEASE================  Afebrile, WBC downtrending 17.30 --> 17.48  Continue trending WBC and monitoring fever curve   Perioperative coverage with Cefuroxime     cefuroxime  IVPB 1500 milliGRAM(s) IV Intermittent every 8 hours      Patient requires continuous monitoring with bedside rhythm monitoring, pulse ox monitoring, and intermittent blood gas analysis. Care plan discussed with ICU care team. Patient remained critical and at risk for life threatening decompensation.     By signing my name below, ITejinder Tiffany, attest that this documentation has been prepared under the direction and in the presence of SARITA Carbajal   Electronically signed: Albina Marr Scribe, 10-14-21 @ 13:21    I, SARITA Carbajal  , personally performed the services described in this documentation. all medical record entries made by the ezibbailey were at my direction and in my presence. I have reviewed the chart and agree that the record reflects my personal performance and is accurate and complete  Electronically signed: SARITA Carbajal

## 2021-10-14 NOTE — CHART NOTE - NSCHARTNOTEFT_GEN_A_CORE
Electrophysiology    Pt is s/p MVR, TVR, LAAL, and LV epicardial lead placement today. 2008 pulse generator intact.   AF - AC with heparin gtt, restart when ok with primary team.  EP will sign off, please reconsult when patient is ready for pulse generator change.     - SARITA Price  [phone]
Electrophysiology    Tele: AF 60-70s with occasional PVCs.     Pt is s/p MVR, TVR, LAAL, RA/RV leads cut & extracted, and LV epicardial lead placement 10/11.   TTE with pericardial effusion, was taken to OR 10/13 for washout. Remains on Dobutamine, Vasopressin, Levo.   Will plan for PPM pulse generator & connecting LV epicardial lead at later date.    - SARITA Price

## 2021-10-14 NOTE — AIRWAY REMOVAL NOTE  ADULT & PEDS - RESPIRATORY EXPANSION/ACCESSORY MUSCLES/RETRACTIONS
no use of accessory muscles/no retractions/expansion symmetric
no use of accessory muscles/no retractions/expansion symmetric

## 2021-10-15 LAB
ALBUMIN SERPL ELPH-MCNC: 3.6 G/DL — SIGNIFICANT CHANGE UP (ref 3.3–5)
ALP SERPL-CCNC: 96 U/L — SIGNIFICANT CHANGE UP (ref 40–120)
ALT FLD-CCNC: 24 U/L — SIGNIFICANT CHANGE UP (ref 10–45)
ANION GAP SERPL CALC-SCNC: 13 MMOL/L — SIGNIFICANT CHANGE UP (ref 5–17)
APTT BLD: 28.5 SEC — SIGNIFICANT CHANGE UP (ref 27.5–35.5)
AST SERPL-CCNC: 42 U/L — HIGH (ref 10–40)
BASE EXCESS BLDV CALC-SCNC: -0.4 MMOL/L — SIGNIFICANT CHANGE UP (ref -2–2)
BASE EXCESS BLDV CALC-SCNC: 0.2 MMOL/L — SIGNIFICANT CHANGE UP (ref -2–2)
BASE EXCESS BLDV CALC-SCNC: 2.2 MMOL/L — HIGH (ref -2–2)
BILIRUB SERPL-MCNC: 0.8 MG/DL — SIGNIFICANT CHANGE UP (ref 0.2–1.2)
BUN SERPL-MCNC: 56 MG/DL — HIGH (ref 7–23)
CA-I SERPL-SCNC: 1.13 MMOL/L — LOW (ref 1.15–1.33)
CALCIUM SERPL-MCNC: 8.3 MG/DL — LOW (ref 8.4–10.5)
CHLORIDE BLDV-SCNC: 104 MMOL/L — SIGNIFICANT CHANGE UP (ref 96–108)
CHLORIDE SERPL-SCNC: 99 MMOL/L — SIGNIFICANT CHANGE UP (ref 96–108)
CO2 BLDV-SCNC: 26 MMOL/L — SIGNIFICANT CHANGE UP (ref 22–26)
CO2 BLDV-SCNC: 27 MMOL/L — HIGH (ref 22–26)
CO2 BLDV-SCNC: 29 MMOL/L — HIGH (ref 22–26)
CO2 SERPL-SCNC: 23 MMOL/L — SIGNIFICANT CHANGE UP (ref 22–31)
CREAT SERPL-MCNC: 1.76 MG/DL — HIGH (ref 0.5–1.3)
GAS PNL BLDA: SIGNIFICANT CHANGE UP
GAS PNL BLDV: 133 MMOL/L — LOW (ref 136–145)
GAS PNL BLDV: SIGNIFICANT CHANGE UP
GLUCOSE BLDC GLUCOMTR-MCNC: 127 MG/DL — HIGH (ref 70–99)
GLUCOSE BLDC GLUCOMTR-MCNC: 132 MG/DL — HIGH (ref 70–99)
GLUCOSE BLDV-MCNC: 133 MG/DL — HIGH (ref 70–99)
GLUCOSE SERPL-MCNC: 130 MG/DL — HIGH (ref 70–99)
HCO3 BLDV-SCNC: 25 MMOL/L — SIGNIFICANT CHANGE UP (ref 22–29)
HCO3 BLDV-SCNC: 25 MMOL/L — SIGNIFICANT CHANGE UP (ref 22–29)
HCO3 BLDV-SCNC: 27 MMOL/L — SIGNIFICANT CHANGE UP (ref 22–29)
HCT VFR BLD CALC: 24.9 % — LOW (ref 39–50)
HCT VFR BLDA CALC: 23 % — LOW (ref 39–51)
HGB BLD CALC-MCNC: 7.7 G/DL — LOW (ref 12.6–17.4)
HGB BLD-MCNC: 8.1 G/DL — LOW (ref 13–17)
HOROWITZ INDEX BLDV+IHG-RTO: 40 — SIGNIFICANT CHANGE UP
HOROWITZ INDEX BLDV+IHG-RTO: 40 — SIGNIFICANT CHANGE UP
HOROWITZ INDEX BLDV+IHG-RTO: 50 — SIGNIFICANT CHANGE UP
INR BLD: 0.97 RATIO — SIGNIFICANT CHANGE UP (ref 0.88–1.16)
LACTATE BLDV-MCNC: 0.9 MMOL/L — SIGNIFICANT CHANGE UP (ref 0.7–2)
MAGNESIUM SERPL-MCNC: 2.6 MG/DL — SIGNIFICANT CHANGE UP (ref 1.6–2.6)
MCHC RBC-ENTMCNC: 26.6 PG — LOW (ref 27–34)
MCHC RBC-ENTMCNC: 32.5 GM/DL — SIGNIFICANT CHANGE UP (ref 32–36)
MCV RBC AUTO: 81.9 FL — SIGNIFICANT CHANGE UP (ref 80–100)
NRBC # BLD: 0 /100 WBCS — SIGNIFICANT CHANGE UP (ref 0–0)
PCO2 BLDV: 42 MMHG — SIGNIFICANT CHANGE UP (ref 42–55)
PCO2 BLDV: 43 MMHG — SIGNIFICANT CHANGE UP (ref 42–55)
PCO2 BLDV: 43 MMHG — SIGNIFICANT CHANGE UP (ref 42–55)
PH BLDV: 7.38 — SIGNIFICANT CHANGE UP (ref 7.32–7.43)
PH BLDV: 7.38 — SIGNIFICANT CHANGE UP (ref 7.32–7.43)
PH BLDV: 7.41 — SIGNIFICANT CHANGE UP (ref 7.32–7.43)
PHOSPHATE SERPL-MCNC: 4.2 MG/DL — SIGNIFICANT CHANGE UP (ref 2.5–4.5)
PLATELET # BLD AUTO: 132 K/UL — LOW (ref 150–400)
PO2 BLDV: 34 MMHG — SIGNIFICANT CHANGE UP (ref 25–45)
PO2 BLDV: 38 MMHG — SIGNIFICANT CHANGE UP (ref 25–45)
PO2 BLDV: 39 MMHG — SIGNIFICANT CHANGE UP (ref 25–45)
POTASSIUM BLDV-SCNC: 3.7 MMOL/L — SIGNIFICANT CHANGE UP (ref 3.5–5.1)
POTASSIUM SERPL-MCNC: 4.3 MMOL/L — SIGNIFICANT CHANGE UP (ref 3.5–5.3)
POTASSIUM SERPL-SCNC: 4.3 MMOL/L — SIGNIFICANT CHANGE UP (ref 3.5–5.3)
PROT SERPL-MCNC: 5.6 G/DL — LOW (ref 6–8.3)
PROTHROM AB SERPL-ACNC: 11.7 SEC — SIGNIFICANT CHANGE UP (ref 10.6–13.6)
RBC # BLD: 3.04 M/UL — LOW (ref 4.2–5.8)
RBC # FLD: 21.4 % — HIGH (ref 10.3–14.5)
SAO2 % BLDV: 57.4 % — LOW (ref 67–88)
SAO2 % BLDV: 64.3 % — LOW (ref 67–88)
SAO2 % BLDV: 66.8 % — LOW (ref 67–88)
SODIUM SERPL-SCNC: 135 MMOL/L — SIGNIFICANT CHANGE UP (ref 135–145)
SURGICAL PATHOLOGY STUDY: SIGNIFICANT CHANGE UP
WBC # BLD: 14.9 K/UL — HIGH (ref 3.8–10.5)
WBC # FLD AUTO: 14.9 K/UL — HIGH (ref 3.8–10.5)

## 2021-10-15 PROCEDURE — 99291 CRITICAL CARE FIRST HOUR: CPT | Mod: 24

## 2021-10-15 PROCEDURE — 71045 X-RAY EXAM CHEST 1 VIEW: CPT | Mod: 26

## 2021-10-15 PROCEDURE — 93010 ELECTROCARDIOGRAM REPORT: CPT

## 2021-10-15 RX ORDER — METOCLOPRAMIDE HCL 10 MG
5 TABLET ORAL EVERY 8 HOURS
Refills: 0 | Status: COMPLETED | OUTPATIENT
Start: 2021-10-15 | End: 2021-10-17

## 2021-10-15 RX ADMIN — SENNA PLUS 2 TABLET(S): 8.6 TABLET ORAL at 21:07

## 2021-10-15 RX ADMIN — Medication 500 MILLIGRAM(S): at 05:02

## 2021-10-15 RX ADMIN — OXYCODONE HYDROCHLORIDE 10 MILLIGRAM(S): 5 TABLET ORAL at 05:00

## 2021-10-15 RX ADMIN — GABAPENTIN 100 MILLIGRAM(S): 400 CAPSULE ORAL at 13:01

## 2021-10-15 RX ADMIN — Medication 5 MILLIGRAM(S): at 13:56

## 2021-10-15 RX ADMIN — Medication 10 MILLIGRAM(S): at 17:08

## 2021-10-15 RX ADMIN — HEPARIN SODIUM 5000 UNIT(S): 5000 INJECTION INTRAVENOUS; SUBCUTANEOUS at 05:02

## 2021-10-15 RX ADMIN — PANTOPRAZOLE SODIUM 40 MILLIGRAM(S): 20 TABLET, DELAYED RELEASE ORAL at 05:02

## 2021-10-15 RX ADMIN — OXYCODONE HYDROCHLORIDE 10 MILLIGRAM(S): 5 TABLET ORAL at 17:40

## 2021-10-15 RX ADMIN — Medication 500 MILLIGRAM(S): at 17:05

## 2021-10-15 RX ADMIN — OXYCODONE HYDROCHLORIDE 10 MILLIGRAM(S): 5 TABLET ORAL at 13:01

## 2021-10-15 RX ADMIN — POLYETHYLENE GLYCOL 3350 17 GRAM(S): 17 POWDER, FOR SOLUTION ORAL at 09:03

## 2021-10-15 RX ADMIN — GABAPENTIN 100 MILLIGRAM(S): 400 CAPSULE ORAL at 05:02

## 2021-10-15 RX ADMIN — ATORVASTATIN CALCIUM 40 MILLIGRAM(S): 80 TABLET, FILM COATED ORAL at 21:08

## 2021-10-15 RX ADMIN — HEPARIN SODIUM 5000 UNIT(S): 5000 INJECTION INTRAVENOUS; SUBCUTANEOUS at 13:01

## 2021-10-15 RX ADMIN — Medication 5 MILLIGRAM(S): at 21:07

## 2021-10-15 RX ADMIN — FENTANYL CITRATE 25 MICROGRAM(S): 50 INJECTION INTRAVENOUS at 09:15

## 2021-10-15 RX ADMIN — HEPARIN SODIUM 5000 UNIT(S): 5000 INJECTION INTRAVENOUS; SUBCUTANEOUS at 21:07

## 2021-10-15 RX ADMIN — MUPIROCIN 1 APPLICATION(S): 20 OINTMENT TOPICAL at 05:43

## 2021-10-15 RX ADMIN — OXYCODONE HYDROCHLORIDE 10 MILLIGRAM(S): 5 TABLET ORAL at 13:30

## 2021-10-15 RX ADMIN — GABAPENTIN 100 MILLIGRAM(S): 400 CAPSULE ORAL at 21:08

## 2021-10-15 RX ADMIN — MUPIROCIN 1 APPLICATION(S): 20 OINTMENT TOPICAL at 17:06

## 2021-10-15 RX ADMIN — Medication 81 MILLIGRAM(S): at 09:04

## 2021-10-15 RX ADMIN — OXYCODONE HYDROCHLORIDE 10 MILLIGRAM(S): 5 TABLET ORAL at 17:10

## 2021-10-15 RX ADMIN — CHLORHEXIDINE GLUCONATE 1 APPLICATION(S): 213 SOLUTION TOPICAL at 09:09

## 2021-10-15 RX ADMIN — FENTANYL CITRATE 25 MICROGRAM(S): 50 INJECTION INTRAVENOUS at 09:00

## 2021-10-15 RX ADMIN — OXYCODONE HYDROCHLORIDE 10 MILLIGRAM(S): 5 TABLET ORAL at 04:52

## 2021-10-15 NOTE — PROGRESS NOTE ADULT - ASSESSMENT
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 Jose Patterson), CAD s/p CABG in 2009, Paroxysmal AF on Coumadin (non-compliant with A/C and Metoprolol), who admitted to [facility] with acute heart failure exacerbation/sob/LAU.  Patient states he has to stop and catch his breath when walking up stairs. On TTE found to have low normal EF 50-55%, mildly reduced RV function, mod-severe MR and severe TR. He was transferred to [facility] for open heart surgery with Dr. Hua. As per patient, he has not followed up regarding his pacemaker. Attempt made to interrogate it on this admission revealing it is EOL, no response with  or magnet.       1. Severe MR/TR, severely dilated RV s/p MVR/TVR, LAAL, RA & RV endocardial lead extraction, LV epicardial wire placement  2. ?tachy/arely syndrome s/p MDT dual chamber pacemaker - EOL; 2008 pulse generator intact  3. AF  4. h/o mechanical AVR 2008  5. CAD s/p CABG 2009  6. HTN    - Continue to monitor on tele  - Continue Heparin for AC  - Will need PPM generator change prior to discharge once procedure ready.   - Discussed with EP attending.  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 Jose Patterson), CAD s/p CABG in 2009, Paroxysmal AF on Coumadin (non-compliant with A/C and Metoprolol), who admitted to [facility] with acute heart failure exacerbation/sob/LAU.  Patient states he has to stop and catch his breath when walking up stairs. On TTE found to have low normal EF 50-55%, mildly reduced RV function, mod-severe MR and severe TR. He was transferred to [facility] for open heart surgery with Dr. Hua. As per patient, he has not followed up regarding his pacemaker. Attempt made to interrogate it on this admission revealing it is EOL, no response with  or magnet.       1. Severe MR/TR, severely dilated RV s/p MVR/TVR, LAAL, RA & RV endocardial lead extraction, LV epicardial wire placement  2. ?tachy/arely syndrome s/p MDT dual chamber pacemaker - EOL; 2008 pulse generator intact  3. AF  4. h/o mechanical AVR 2008  5. CAD s/p CABG 2009  6. HTN    - Continue to monitor on tele  - Continue Heparin for AC  - Will need PPM generator change prior to discharge. EP to sign off, reconsult once procedure ready.   - Discussed with EP attending       MARCUS Dixon PA-C  [phone]

## 2021-10-15 NOTE — PROGRESS NOTE ADULT - SUBJECTIVE AND OBJECTIVE BOX
EDISON ROBLES  MRN-[id number]  Patient is a 71y old  Male who presents with a chief complaint of SOB/Open Heart Surg eval (14 Oct 2021 13:20)    HPI:  Patient is a 70M PMH HTN, CAD s/p CABG in 2009, Complete Heart Block s/p PPM placement, Paroxysmal Afib on coumadin, Aortic stenosis s/p Mechanical AVR who admitted to OSH with acute heart failure exacerbation/sob/LAU.  Patient states he has to stop and catch his breath when walking up stairs. On echo to have low normal EF 50-55%, Mild reduced RV fx, mod-severe MR and severe TR. Transferred to [facility] for open heart surgery with Dr. Hua. Patient endorses shortness of breath and LAU, denies chest pain, abdominal pain, fevers, chills, nausea.  (05 Oct 2021 23:42)      Surgery/Hospital Course:  10/11 MVR, TVR, LAAL, endocardial lead extraction, epicardial lead placement   10/12 TTE: no effusion   10/13 CT: no RP bleed + blood in L testes 1 PRBC OVN, TTE: 12.4 x 9.5 x 13.2 cm hematoma is seen in the right chest with mass effect on the right atrium. The hematoma appears to be in the pericardial space. The pericardial drain is displaced laterally from the hematoma. 1 small pleural effusion with patchy opacities, suggestive of pulmonary edema. Small right groin hematoma. Dilated IVC due to right atrial compression from the hematoma. RTOR for washout.   10/14 Extubated     Today:    REVIEW OF SYSTEMS:  Gen: No fever  EYES/ENT: No visual changes;  No vertigo or throat pain   NECK: No pain   RES:  No shortness of breath or Cough  CARD: No chest pain   GI: No abdominal pain  : No dysuria  NEURO: No weakness  SKIN: No itching, rashes     ICU Vital Signs Last 24 Hrs  T(C): 36.9 (15 Oct 2021 08:00), Max: 37.4 (15 Oct 2021 00:00)  T(F): 98.4 (15 Oct 2021 08:00), Max: 99.3 (15 Oct 2021 00:00)  HR: 63 (15 Oct 2021 10:00) (61 - 75)  BP: --  BP(mean): --  ABP: 126/51 (15 Oct 2021 10:00) (101/46 - 134/53)  ABP(mean): 75 (15 Oct 2021 10:00) (65 - 81)  RR: 16 (15 Oct 2021 10:00) (13 - 38)  SpO2: 100% (15 Oct 2021 10:00) (84% - 100%)      Physical Exam:  Gen:  Awake, alert   CNS: non focal 	  Neck: no JVD  RES : clear , no wheezing              Chest: +chest tubes   CVS: irregular rhythm. Normal S1/S2  Abd: Soft, non-distended. Bowel sounds present.  Skin: No rash.  Ext:  no edema, R groin hematoma improving, area soft to palpation, + ecchymosis    ============================I/O===========================   I&O's Detail    14 Oct 2021 07:01  -  15 Oct 2021 07:00  --------------------------------------------------------  IN:    DOBUTamine: 112.8 mL    IV PiggyBack: 100 mL    Norepinephrine: 41.4 mL    Oral Fluid: 500 mL    sodium chloride 0.9%: 240 mL  Total IN: 994.2 mL    OUT:    Chest Tube (mL): 360 mL    Chest Tube (mL): 40 mL    Indwelling Catheter - Urethral (mL): 1550 mL    Vasopressin: 0 mL    Vital1.0: 0 mL  Total OUT: 1950 mL    Total NET: -955.8 mL      15 Oct 2021 07:01  -  15 Oct 2021 10:52  --------------------------------------------------------  IN:    DOBUTamine: 14.1 mL    Oral Fluid: 240 mL    sodium chloride 0.9%: 30 mL  Total IN: 284.1 mL    OUT:    Chest Tube (mL): 40 mL    Chest Tube (mL): 60 mL    Indwelling Catheter - Urethral (mL): 140 mL  Total OUT: 240 mL    Total NET: 44.1 mL        ============================ LABS =========================                        8.1    14.90 )-----------( 132      ( 15 Oct 2021 00:26 )             24.9     10-15    135  |  99  |  56<H>  ----------------------------<  130<H>  4.3   |  23  |  1.76<H>    Ca    8.3<L>      15 Oct 2021 00:25  Phos  4.2     10-15  Mg     2.6     10-15    TPro  5.6<L>  /  Alb  3.6  /  TBili  0.8  /  DBili  x   /  AST  42<H>  /  ALT  24  /  AlkPhos  96  10-15    LIVER FUNCTIONS - ( 15 Oct 2021 00:25 )  Alb: 3.6 g/dL / Pro: 5.6 g/dL / ALK PHOS: 96 U/L / ALT: 24 U/L / AST: 42 U/L / GGT: x           PT/INR - ( 15 Oct 2021 00:26 )   PT: 11.7 sec;   INR: 0.97 ratio         PTT - ( 15 Oct 2021 00:26 )  PTT:28.5 sec  ABG - ( 15 Oct 2021 10:42 )  pH, Arterial: 7.41  pH, Blood: x     /  pCO2: 41    /  pO2: 80    / HCO3: 26    / Base Excess: 1.2   /  SaO2: 97.7                ======================Micro/Rad/Cardio=================  CXR: Reviewed  Echo:Reviewed  ======================================================  PAST MEDICAL & SURGICAL HISTORY:  HTN (hypertension)    Complete heart block    CAD (coronary artery disease)    Paroxysmal atrial fibrillation    History of mechanical aortic valve replacement    History of pacemaker    S/P CABG (coronary artery bypass graft)      ====================ASSESSMENT ==============  MR/TR, afib s/p MVR, TVR, LAAL, endocardial lead extraction, epicardial lead placement on 10/11   Pericardial effusion with cardiac tamponade, s/p RTOR for sternotomy/washout on 10/13   Hx of tachy-arely syndrome, 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     acetaminophen   Tablet .. 650 milliGRAM(s) Oral every 6 hours PRN Mild Pain (1 - 3)  fentaNYL    Injectable 25 MICROGram(s) IV Push every 3 hours PRN Severe Pain (7 - 10)  gabapentin 100 milliGRAM(s) Oral every 8 hours  oxyCODONE    IR 5 milliGRAM(s) Oral every 4 hours PRN Moderate Pain (4 - 6)  oxyCODONE    IR 10 milliGRAM(s) Oral every 4 hours PRN Severe Pain (7 - 10)  ==================== RESPIRATORY======================  Receiving supplemental O2 therapy with HFNC  Continue to monitor RR, breathing pattern, pulse ox, and ABG's.  Encourage incentive spirometry.     ====================CARDIOVASCULAR==================  MR/TR, Afib s/p MVR, TVR, LAAL, endocardial lead extraction, epicardial lead placement on 10/11  Prior PPM in place, wires cut; Temporary back up pacing wires in place   CT chest notable for 12.4 x 9.5 x 13.2 cm hematoma seen in R chest with mass effect on the right atrium  s/p RTOR for sternotomy, washout on 10/13   Lactate peaked to 4.3 on 10/11, now 0.8, continue trending   Continue invasive hemodynamic monitoring   Inotropic support w/ Dobutamine for RV support  ASA/Lipitor for recent valve procedure     DOBUTamine Infusion 2.5 MICROgram(s)/kG/Min (4.73 mL/Hr) IV Continuous <Continuous>  atorvastatin 40 milliGRAM(s) Oral at bedtime  aspirin enteric coated 81 milliGRAM(s) Oral daily  ===================HEMATOLOGIC/ONC ===================  Thrombocytopenia and acute blood loss anemia in setting of moderate post op mediastinal bleeding requiring Amicar post op   Noted to have firm ecchymosis around R femoral a line site appears to be improving, will continue to monitor closely.   Monitor H&H/Plts-transfuse as needed   Continue Heparin for venous thromboembolism prophylaxis.       heparin   Injectable 5000 Unit(s) SubCutaneous every 8 hours  ===================== RENAL =========================  Continue to monitor I/Os, BUN/Creatinine, and urine output  Replete lytes PRN. Keep K> 4 and Mg >2     Continue monitoring urine output, I&OS, BUN/Cr   ==================== GASTROINTESTINAL===================  Tolerating PO consistent carb diet.   Continue Protonix for stress ulcer prophylaxis.   Bowel regimen with Miralax and Senna     ascorbic acid 500 milliGRAM(s) Oral two times a day  bisacodyl Suppository 10 milliGRAM(s) Rectal once  pantoprazole    Tablet 40 milliGRAM(s) Oral before breakfast  polyethylene glycol 3350 17 Gram(s) Oral daily  senna 2 Tablet(s) Oral at bedtime  sodium chloride 0.9%. 1000 milliLiter(s) (10 mL/Hr) IV Continuous <Continuous>  =======================    ENDOCRINE  =====================  Stress hyperglycemia, continue glucose control with admelog SS    dextrose 50% Injectable 50 milliLiter(s) IV Push every 15 minutes  dextrose 50% Injectable 25 milliLiter(s) IV Push every 15 minutes  insulin lispro (ADMELOG) corrective regimen sliding scale   SubCutaneous three times a day before meals  insulin lispro (ADMELOG) corrective regimen sliding scale   SubCutaneous at bedtime  ========================INFECTIOUS DISEASE================  Afebrile, WBC downtrending 17.30 --> 14.90  Continue trending WBC and monitoring fever curve       Patient requires continuous monitoring with bedside rhythm monitoring, pulse ox monitoring, and intermittent blood gas analysis. Care plan discussed with ICU care team. Patient remained critical and at risk for life threatening decompensation.     By signing my name below, I, Albina Marr, attest that this documentation has been prepared under the direction and in the presence of Kalani Blount)  Electronically signed: Albina Marr Scribe, 10-15-21 @ 10:52    I, Kalani Blount (PA) , personally performed the services described in this documentation. all medical record entries made by the miles were at my direction and in my presence. I have reviewed the chart and agree that the record reflects my personal performance and is accurate and complete  Electronically signed: Kalani Blount (PA)       EDISON ROBLES  MRN-[id number]  Patient is a 71y old  Male who presents with a chief complaint of SOB/Open Heart Surg eval (14 Oct 2021 13:20)    HPI:  Patient is a 70M PMH HTN, CAD s/p CABG in 2009, Complete Heart Block s/p PPM placement, Paroxysmal Afib on coumadin, Aortic stenosis s/p Mechanical AVR who admitted to OSH with acute heart failure exacerbation/sob/LAU.  Patient states he has to stop and catch his breath when walking up stairs. On echo to have low normal EF 50-55%, Mild reduced RV fx, mod-severe MR and severe TR. Transferred to [facility] for open heart surgery with Dr. Hua. Patient endorses shortness of breath and LAU, denies chest pain, abdominal pain, fevers, chills, nausea.  (05 Oct 2021 23:42)      Surgery/Hospital Course:  10/11 MVR, TVR, LAAL, endocardial lead extraction, epicardial lead placement   10/12 TTE: no effusion   10/13 CT: no RP bleed + blood in L testes 1 PRBC OVN, TTE: 12.4 x 9.5 x 13.2 cm hematoma is seen in the right chest with mass effect on the right atrium. The hematoma appears to be in the pericardial space. The pericardial drain is displaced laterally from the hematoma. 1 small pleural effusion with patchy opacities, suggestive of pulmonary edema. Small right groin hematoma. Dilated IVC due to right atrial compression from the hematoma. RTOR for washout.   10/14 Extubated     Today:  off pressors, weaning dobutamine, weaning HFNC    REVIEW OF SYSTEMS:  Gen: No fever  EYES/ENT: No visual changes;  No vertigo or throat pain   NECK: No pain   RES:  No shortness of breath or Cough  CARD: No chest pain   GI: No abdominal pain  : No dysuria  NEURO: No weakness  SKIN: No itching, rashes     ICU Vital Signs Last 24 Hrs  T(C): 36.9 (15 Oct 2021 08:00), Max: 37.4 (15 Oct 2021 00:00)  T(F): 98.4 (15 Oct 2021 08:00), Max: 99.3 (15 Oct 2021 00:00)  HR: 63 (15 Oct 2021 10:00) (61 - 75)  BP: --  BP(mean): --  ABP: 126/51 (15 Oct 2021 10:00) (101/46 - 134/53)  ABP(mean): 75 (15 Oct 2021 10:00) (65 - 81)  RR: 16 (15 Oct 2021 10:00) (13 - 38)  SpO2: 100% (15 Oct 2021 10:00) (84% - 100%)      Physical Exam:  Gen:  Awake, alert   CNS: non focal 	  Neck: no JVD  RES : clear, no wheezing              Chest: +chest tubes   CVS: irregular rhythm. Normal S1/S2  Abd: Soft, non-distended. Bowel sounds present.  Skin: No rash.  Ext:  no edema, R groin hematoma improving, area soft to palpation, + ecchymosis > improving    ============================I/O===========================   I&O's Detail    14 Oct 2021 07:01  -  15 Oct 2021 07:00  --------------------------------------------------------  IN:    DOBUTamine: 112.8 mL    IV PiggyBack: 100 mL    Norepinephrine: 41.4 mL    Oral Fluid: 500 mL    sodium chloride 0.9%: 240 mL  Total IN: 994.2 mL    OUT:    Chest Tube (mL): 360 mL    Chest Tube (mL): 40 mL    Indwelling Catheter - Urethral (mL): 1550 mL    Vasopressin: 0 mL    Vital1.0: 0 mL  Total OUT: 1950 mL    Total NET: -955.8 mL      15 Oct 2021 07:01  -  15 Oct 2021 10:52  --------------------------------------------------------  IN:    DOBUTamine: 14.1 mL    Oral Fluid: 240 mL    sodium chloride 0.9%: 30 mL  Total IN: 284.1 mL    OUT:    Chest Tube (mL): 40 mL    Chest Tube (mL): 60 mL    Indwelling Catheter - Urethral (mL): 140 mL  Total OUT: 240 mL    Total NET: 44.1 mL        ============================ LABS =========================                        8.1    14.90 )-----------( 132      ( 15 Oct 2021 00:26 )             24.9     10-15    135  |  99  |  56<H>  ----------------------------<  130<H>  4.3   |  23  |  1.76<H>    Ca    8.3<L>      15 Oct 2021 00:25  Phos  4.2     10-15  Mg     2.6     10-15    TPro  5.6<L>  /  Alb  3.6  /  TBili  0.8  /  DBili  x   /  AST  42<H>  /  ALT  24  /  AlkPhos  96  10-15    LIVER FUNCTIONS - ( 15 Oct 2021 00:25 )  Alb: 3.6 g/dL / Pro: 5.6 g/dL / ALK PHOS: 96 U/L / ALT: 24 U/L / AST: 42 U/L / GGT: x           PT/INR - ( 15 Oct 2021 00:26 )   PT: 11.7 sec;   INR: 0.97 ratio         PTT - ( 15 Oct 2021 00:26 )  PTT:28.5 sec  ABG - ( 15 Oct 2021 10:42 )  pH, Arterial: 7.41  pH, Blood: x     /  pCO2: 41    /  pO2: 80    / HCO3: 26    / Base Excess: 1.2   /  SaO2: 97.7                ======================Micro/Rad/Cardio=================  CXR: Reviewed  Echo:Reviewed  ======================================================  PAST MEDICAL & SURGICAL HISTORY:  HTN (hypertension)    Complete heart block    CAD (coronary artery disease)    Paroxysmal atrial fibrillation    History of mechanical aortic valve replacement    History of pacemaker    S/P CABG (coronary artery bypass graft)      ====================ASSESSMENT ==============  MR/TR, afib s/p MVR, TVR, LAAL, endocardial lead extraction, epicardial lead placement on 10/11   Pericardial effusion with cardiac tamponade, s/p RTOR for sternotomy/washout on 10/13   Hx of tachy-arely syndrome, 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     acetaminophen   Tablet .. 650 milliGRAM(s) Oral every 6 hours PRN Mild Pain (1 - 3)  fentaNYL    Injectable 25 MICROGram(s) IV Push every 3 hours PRN Severe Pain (7 - 10)  gabapentin 100 milliGRAM(s) Oral every 8 hours  oxyCODONE    IR 5 milliGRAM(s) Oral every 4 hours PRN Moderate Pain (4 - 6)  oxyCODONE    IR 10 milliGRAM(s) Oral every 4 hours PRN Severe Pain (7 - 10)    ==================== RESPIRATORY======================  Receiving supplemental O2 therapy with HFNC  Continue to monitor RR, breathing pattern, pulse ox, and ABG's.  Encourage incentive spirometry.     ====================CARDIOVASCULAR==================  MR/TR, Afib s/p MVR, TVR, LAAL, endocardial lead extraction, epicardial lead placement on 10/11  Prior PPM in place, wires cut; Temporary back up pacing wires in place   CT chest notable for 12.4 x 9.5 x 13.2 cm hematoma seen in R chest with mass effect on the right atrium  s/p RTOR for sternotomy, washout on 10/13   Lactate peaked to 4.3 on 10/11, now 0.8, continue trending   Continue invasive hemodynamic monitoring   Weaning Dobutamine today  ASA/Lipitor for recent valve procedure   Planning for tuesday with EP > PPM generator    DOBUTamine Infusion 2.5 MICROgram(s)/kG/Min (4.73 mL/Hr) IV Continuous <Continuous>  atorvastatin 40 milliGRAM(s) Oral at bedtime  aspirin enteric coated 81 milliGRAM(s) Oral daily  ===================HEMATOLOGIC/ONC ===================  Thrombocytopenia and acute blood loss anemia in setting of moderate post op mediastinal bleeding requiring Amicar post op   Noted to have firm ecchymosis around R femoral a line site appears to be improving, will continue to monitor closely.   Monitor H&H/Plts-transfuse as needed   Continue Heparin for venous thromboembolism prophylaxis.       heparin   Injectable 5000 Unit(s) SubCutaneous every 8 hours  ===================== RENAL =========================  Continue to monitor I/Os, BUN/Creatinine, and urine output  Replete lytes PRN. Keep K> 4 and Mg >2     Continue monitoring urine output, I&OS, BUN/Cr   ==================== GASTROINTESTINAL===================  Tolerating PO consistent carb diet.   Continue Protonix for stress ulcer prophylaxis.   Bowel regimen with Miralax and Senna     ascorbic acid 500 milliGRAM(s) Oral two times a day  bisacodyl Suppository 10 milliGRAM(s) Rectal once  pantoprazole    Tablet 40 milliGRAM(s) Oral before breakfast  polyethylene glycol 3350 17 Gram(s) Oral daily  senna 2 Tablet(s) Oral at bedtime  sodium chloride 0.9%. 1000 milliLiter(s) (10 mL/Hr) IV Continuous <Continuous>  =======================    ENDOCRINE  =====================  Stress hyperglycemia, continue glucose control with admelog SS    dextrose 50% Injectable 50 milliLiter(s) IV Push every 15 minutes  dextrose 50% Injectable 25 milliLiter(s) IV Push every 15 minutes  insulin lispro (ADMELOG) corrective regimen sliding scale   SubCutaneous three times a day before meals  insulin lispro (ADMELOG) corrective regimen sliding scale   SubCutaneous at bedtime  ========================INFECTIOUS DISEASE================  Afebrile, WBC downtrending 17.30 --> 14.90  Continue trending WBC and monitoring fever curve       Patient requires continuous monitoring with bedside rhythm monitoring, pulse ox monitoring, and intermittent blood gas analysis. Care plan discussed with ICU care team. Patient remained critical and at risk for life threatening decompensation.     By signing my name below, ITejinder Tiffany, attest that this documentation has been prepared under the direction and in the presence of Kalani Blount)  Electronically signed: Albina Marr Scribe, 10-15-21 @ 10:52    [place] Melinda (PA) , personally performed the services described in this documentation. all medical record entries made by the miles were at my direction and in my presence. I have reviewed the chart and agree that the record reflects my personal performance and is accurate and complete  Electronically signed: Kalani Blount (PA)

## 2021-10-15 NOTE — PROGRESS NOTE ADULT - SUBJECTIVE AND OBJECTIVE BOX
24H hour events: Pt seen and examined at bedside. Denies CP, palpitations, dizziness. Tele: AFib 60-70s, PVCs    MEDICATIONS:  aspirin enteric coated 81 milliGRAM(s) Oral daily  DOBUTamine Infusion 2.5 MICROgram(s)/kG/Min IV Continuous <Continuous>  heparin   Injectable 5000 Unit(s) SubCutaneous every 8 hours  acetaminophen   Tablet .. 650 milliGRAM(s) Oral every 6 hours PRN  fentaNYL    Injectable 25 MICROGram(s) IV Push every 3 hours PRN  gabapentin 100 milliGRAM(s) Oral every 8 hours  oxyCODONE    IR 5 milliGRAM(s) Oral every 4 hours PRN  oxyCODONE    IR 10 milliGRAM(s) Oral every 4 hours PRN  bisacodyl Suppository 10 milliGRAM(s) Rectal once  pantoprazole    Tablet 40 milliGRAM(s) Oral before breakfast  polyethylene glycol 3350 17 Gram(s) Oral daily  senna 2 Tablet(s) Oral at bedtime  atorvastatin 40 milliGRAM(s) Oral at bedtime  dextrose 50% Injectable 50 milliLiter(s) IV Push every 15 minutes  dextrose 50% Injectable 25 milliLiter(s) IV Push every 15 minutes  insulin lispro (ADMELOG) corrective regimen sliding scale   SubCutaneous three times a day before meals  insulin lispro (ADMELOG) corrective regimen sliding scale   SubCutaneous at bedtime  ascorbic acid 500 milliGRAM(s) Oral two times a day  chlorhexidine 2% Cloths 1 Application(s) Topical daily  mupirocin 2% Ointment 1 Application(s) Both Nostrils two times a day  sodium chloride 0.9%. 1000 milliLiter(s) IV Continuous <Continuous>      REVIEW OF SYSTEMS:  See HPI, otherwise ROS negative.    PHYSICAL EXAM:  T(C): 36.9 (10-15-21 @ 08:00), Max: 37.4 (10-15-21 @ 00:00)  HR: 63 (10-15-21 @ 11:00) (61 - 75)  BP: --  RR: 28 (10-15-21 @ 11:00) (13 - 38)  SpO2: 98% (10-15-21 @ 11:00) (84% - 100%)  Wt(kg): --  I&O's Summary    14 Oct 2021 07:01  -  15 Oct 2021 07:00  --------------------------------------------------------  IN: 994.2 mL / OUT: 1950 mL / NET: -955.8 mL    15 Oct 2021 07:01  -  15 Oct 2021 11:19  --------------------------------------------------------  IN: 304.1 mL / OUT: 360 mL / NET: -55.9 mL      Appearance: Alert. NAD. Right IJ, High Flow. Temp Wires.  	  Cardiovascular: +S1S2 irregularly irregular no m/g/r  Respiratory: CTA B/L, diminished at bases. 	  Psychiatry: A & O x 3, Mood & affect appropriate  Gastrointestinal:  Soft, NT. ND. +BS	  Skin: Sternal Dressing, Healed Left Pectoral PPM site.   Extremities: No edema BLE  Vascular: Peripheral pulses palpable 2+ bilaterally      LABS:	 	    CBC Full  -  ( 15 Oct 2021 00:26 )  WBC Count : 14.90 K/uL  Hemoglobin : 8.1 g/dL  Hematocrit : 24.9 %  Platelet Count - Automated : 132 K/uL  Mean Cell Volume : 81.9 fl  Mean Cell Hemoglobin : 26.6 pg  Mean Cell Hemoglobin Concentration : 32.5 gm/dL  Auto Neutrophil # : x  Auto Lymphocyte # : x  Auto Monocyte # : x  Auto Eosinophil # : x  Auto Basophil # : x  Auto Neutrophil % : x  Auto Lymphocyte % : x  Auto Monocyte % : x  Auto Eosinophil % : x  Auto Basophil % : x    10-15    135  |  99  |  56<H>  ----------------------------<  130<H>  4.3   |  23  |  1.76<H>  10-14    135  |  99  |  50<H>  ----------------------------<  126<H>  5.1   |  21<L>  |  1.80<H>    Ca    8.3<L>      15 Oct 2021 00:25  Ca    8.7      14 Oct 2021 00:26  Phos  4.2     10-15  Phos  5.5     10-14  Mg     2.6     10-15  Mg     2.6     10-14    TPro  5.6<L>  /  Alb  3.6  /  TBili  0.8  /  DBili  x   /  AST  42<H>  /  ALT  24  /  AlkPhos  96  10-15  TPro  5.4<L>  /  Alb  3.5  /  TBili  1.2  /  DBili  x   /  AST  53<H>  /  ALT  22  /  AlkPhos  77  10-14      TELEMETRY: AFib 60-70s, PVCs    RADIOLOGY:  EXAM:  XR CHEST PORTABLE ROUTINE 1V                        PROCEDURE DATE:  10/14/2021      INTERPRETATION:  CLINICAL INFORMATION: Cardiothoracic surgery.    TECHNIQUE: AP view of the chest.    COMPARISON: CT chest from October 13, 2021.    FINDINGS:    Marked cardiomegaly again seen. Right IJ central venous catheter terminates in the SVC. Enteric tube courses below the diaphragm and distal portion is partially seen within the stomach. Mediastinal drains are noted. Sternotomy wires, left atrial appendage clip, AVR, and left chest wall cardiac pacer discontinued leads. The right costophrenic angle is partially visualized. The visualized lungs are clear. No pneumothorax. 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    IMPRESSION:    Cardiomegaly  The visualized lungs are clear.    --- End of Report ---    SONDRA ADEN DO; Resident Radiologist  This document has been electronically signed.  CALEB العراقي MD; Attending Radiologist  This document has been electronically signed. Oct 14 2021 11:32PM

## 2021-10-16 LAB
ALBUMIN SERPL ELPH-MCNC: 3.4 G/DL — SIGNIFICANT CHANGE UP (ref 3.3–5)
ALP SERPL-CCNC: 106 U/L — SIGNIFICANT CHANGE UP (ref 40–120)
ALT FLD-CCNC: 23 U/L — SIGNIFICANT CHANGE UP (ref 10–45)
ANION GAP SERPL CALC-SCNC: 14 MMOL/L — SIGNIFICANT CHANGE UP (ref 5–17)
APTT BLD: 29.2 SEC — SIGNIFICANT CHANGE UP (ref 27.5–35.5)
APTT BLD: 69.4 SEC — HIGH (ref 27.5–35.5)
AST SERPL-CCNC: 35 U/L — SIGNIFICANT CHANGE UP (ref 10–40)
BILIRUB SERPL-MCNC: 0.8 MG/DL — SIGNIFICANT CHANGE UP (ref 0.2–1.2)
BUN SERPL-MCNC: 44 MG/DL — HIGH (ref 7–23)
CALCIUM SERPL-MCNC: 8.5 MG/DL — SIGNIFICANT CHANGE UP (ref 8.4–10.5)
CHLORIDE SERPL-SCNC: 100 MMOL/L — SIGNIFICANT CHANGE UP (ref 96–108)
CO2 SERPL-SCNC: 21 MMOL/L — LOW (ref 22–31)
CREAT SERPL-MCNC: 1.17 MG/DL — SIGNIFICANT CHANGE UP (ref 0.5–1.3)
GAS PNL BLDA: SIGNIFICANT CHANGE UP
GLUCOSE BLDC GLUCOMTR-MCNC: 132 MG/DL — HIGH (ref 70–99)
GLUCOSE BLDC GLUCOMTR-MCNC: 141 MG/DL — HIGH (ref 70–99)
GLUCOSE BLDC GLUCOMTR-MCNC: 157 MG/DL — HIGH (ref 70–99)
GLUCOSE SERPL-MCNC: 118 MG/DL — HIGH (ref 70–99)
HCT VFR BLD CALC: 23.9 % — LOW (ref 39–50)
HCT VFR BLD CALC: 25.1 % — LOW (ref 39–50)
HGB BLD-MCNC: 7.3 G/DL — LOW (ref 13–17)
HGB BLD-MCNC: 7.9 G/DL — LOW (ref 13–17)
INR BLD: 0.97 RATIO — SIGNIFICANT CHANGE UP (ref 0.88–1.16)
MAGNESIUM SERPL-MCNC: 2.5 MG/DL — SIGNIFICANT CHANGE UP (ref 1.6–2.6)
MCHC RBC-ENTMCNC: 25.7 PG — LOW (ref 27–34)
MCHC RBC-ENTMCNC: 26.5 PG — LOW (ref 27–34)
MCHC RBC-ENTMCNC: 30.5 GM/DL — LOW (ref 32–36)
MCHC RBC-ENTMCNC: 31.5 GM/DL — LOW (ref 32–36)
MCV RBC AUTO: 84.2 FL — SIGNIFICANT CHANGE UP (ref 80–100)
MCV RBC AUTO: 84.2 FL — SIGNIFICANT CHANGE UP (ref 80–100)
NRBC # BLD: 0 /100 WBCS — SIGNIFICANT CHANGE UP (ref 0–0)
NRBC # BLD: 0 /100 WBCS — SIGNIFICANT CHANGE UP (ref 0–0)
PHOSPHATE SERPL-MCNC: 2.2 MG/DL — LOW (ref 2.5–4.5)
PLATELET # BLD AUTO: 179 K/UL — SIGNIFICANT CHANGE UP (ref 150–400)
PLATELET # BLD AUTO: 191 K/UL — SIGNIFICANT CHANGE UP (ref 150–400)
POTASSIUM SERPL-MCNC: 4.4 MMOL/L — SIGNIFICANT CHANGE UP (ref 3.5–5.3)
POTASSIUM SERPL-SCNC: 4.4 MMOL/L — SIGNIFICANT CHANGE UP (ref 3.5–5.3)
PROT SERPL-MCNC: 5.6 G/DL — LOW (ref 6–8.3)
PROTHROM AB SERPL-ACNC: 11.7 SEC — SIGNIFICANT CHANGE UP (ref 10.6–13.6)
RBC # BLD: 2.84 M/UL — LOW (ref 4.2–5.8)
RBC # BLD: 2.98 M/UL — LOW (ref 4.2–5.8)
RBC # FLD: 21.7 % — HIGH (ref 10.3–14.5)
RBC # FLD: 21.9 % — HIGH (ref 10.3–14.5)
SODIUM SERPL-SCNC: 135 MMOL/L — SIGNIFICANT CHANGE UP (ref 135–145)
WBC # BLD: 12.72 K/UL — HIGH (ref 3.8–10.5)
WBC # BLD: 14.81 K/UL — HIGH (ref 3.8–10.5)
WBC # FLD AUTO: 12.72 K/UL — HIGH (ref 3.8–10.5)
WBC # FLD AUTO: 14.81 K/UL — HIGH (ref 3.8–10.5)

## 2021-10-16 PROCEDURE — 99291 CRITICAL CARE FIRST HOUR: CPT | Mod: 24

## 2021-10-16 PROCEDURE — 71045 X-RAY EXAM CHEST 1 VIEW: CPT | Mod: 26

## 2021-10-16 PROCEDURE — 93306 TTE W/DOPPLER COMPLETE: CPT | Mod: 26

## 2021-10-16 RX ORDER — LACTULOSE 10 G/15ML
10 SOLUTION ORAL ONCE
Refills: 0 | Status: COMPLETED | OUTPATIENT
Start: 2021-10-16 | End: 2021-10-16

## 2021-10-16 RX ORDER — LEVOTHYROXINE SODIUM 125 MCG
25 TABLET ORAL AT BEDTIME
Refills: 0 | Status: DISCONTINUED | OUTPATIENT
Start: 2021-10-16 | End: 2021-10-17

## 2021-10-16 RX ORDER — HYDROMORPHONE HYDROCHLORIDE 2 MG/ML
0.5 INJECTION INTRAMUSCULAR; INTRAVENOUS; SUBCUTANEOUS ONCE
Refills: 0 | Status: DISCONTINUED | OUTPATIENT
Start: 2021-10-16 | End: 2021-10-16

## 2021-10-16 RX ORDER — FUROSEMIDE 40 MG
20 TABLET ORAL ONCE
Refills: 0 | Status: COMPLETED | OUTPATIENT
Start: 2021-10-16 | End: 2021-10-16

## 2021-10-16 RX ORDER — HEPARIN SODIUM 5000 [USP'U]/ML
800 INJECTION INTRAVENOUS; SUBCUTANEOUS
Qty: 25000 | Refills: 0 | Status: DISCONTINUED | OUTPATIENT
Start: 2021-10-16 | End: 2021-10-19

## 2021-10-16 RX ORDER — ACETAMINOPHEN 500 MG
1000 TABLET ORAL ONCE
Refills: 0 | Status: COMPLETED | OUTPATIENT
Start: 2021-10-16 | End: 2021-10-16

## 2021-10-16 RX ORDER — SIMETHICONE 80 MG/1
80 TABLET, CHEWABLE ORAL EVERY 6 HOURS
Refills: 0 | Status: DISCONTINUED | OUTPATIENT
Start: 2021-10-16 | End: 2021-10-19

## 2021-10-16 RX ORDER — MAGNESIUM HYDROXIDE 400 MG/1
30 TABLET, CHEWABLE ORAL DAILY
Refills: 0 | Status: DISCONTINUED | OUTPATIENT
Start: 2021-10-16 | End: 2021-10-17

## 2021-10-16 RX ORDER — FUROSEMIDE 40 MG
20 TABLET ORAL
Refills: 0 | Status: DISCONTINUED | OUTPATIENT
Start: 2021-10-17 | End: 2021-10-18

## 2021-10-16 RX ORDER — FUROSEMIDE 40 MG
40 TABLET ORAL ONCE
Refills: 0 | Status: COMPLETED | OUTPATIENT
Start: 2021-10-16 | End: 2021-10-16

## 2021-10-16 RX ADMIN — ATORVASTATIN CALCIUM 40 MILLIGRAM(S): 80 TABLET, FILM COATED ORAL at 21:21

## 2021-10-16 RX ADMIN — Medication 5 MILLIGRAM(S): at 06:20

## 2021-10-16 RX ADMIN — Medication 25 MICROGRAM(S): at 21:22

## 2021-10-16 RX ADMIN — GABAPENTIN 100 MILLIGRAM(S): 400 CAPSULE ORAL at 06:19

## 2021-10-16 RX ADMIN — CHLORHEXIDINE GLUCONATE 1 APPLICATION(S): 213 SOLUTION TOPICAL at 12:41

## 2021-10-16 RX ADMIN — POLYETHYLENE GLYCOL 3350 17 GRAM(S): 17 POWDER, FOR SOLUTION ORAL at 12:29

## 2021-10-16 RX ADMIN — GABAPENTIN 100 MILLIGRAM(S): 400 CAPSULE ORAL at 13:35

## 2021-10-16 RX ADMIN — SIMETHICONE 80 MILLIGRAM(S): 80 TABLET, CHEWABLE ORAL at 10:21

## 2021-10-16 RX ADMIN — HYDROMORPHONE HYDROCHLORIDE 0.5 MILLIGRAM(S): 2 INJECTION INTRAMUSCULAR; INTRAVENOUS; SUBCUTANEOUS at 15:17

## 2021-10-16 RX ADMIN — Medication 20 MILLIGRAM(S): at 10:21

## 2021-10-16 RX ADMIN — OXYCODONE HYDROCHLORIDE 10 MILLIGRAM(S): 5 TABLET ORAL at 01:45

## 2021-10-16 RX ADMIN — Medication 1000 MILLIGRAM(S): at 20:30

## 2021-10-16 RX ADMIN — Medication 40 MILLIGRAM(S): at 15:17

## 2021-10-16 RX ADMIN — Medication 20 MILLIGRAM(S): at 21:43

## 2021-10-16 RX ADMIN — Medication 5 MILLIGRAM(S): at 21:21

## 2021-10-16 RX ADMIN — OXYCODONE HYDROCHLORIDE 10 MILLIGRAM(S): 5 TABLET ORAL at 01:11

## 2021-10-16 RX ADMIN — HEPARIN SODIUM 8 UNIT(S)/HR: 5000 INJECTION INTRAVENOUS; SUBCUTANEOUS at 10:21

## 2021-10-16 RX ADMIN — MAGNESIUM HYDROXIDE 30 MILLILITER(S): 400 TABLET, CHEWABLE ORAL at 12:29

## 2021-10-16 RX ADMIN — Medication 500 MILLIGRAM(S): at 06:28

## 2021-10-16 RX ADMIN — HYDROMORPHONE HYDROCHLORIDE 0.5 MILLIGRAM(S): 2 INJECTION INTRAMUSCULAR; INTRAVENOUS; SUBCUTANEOUS at 16:00

## 2021-10-16 RX ADMIN — SENNA PLUS 2 TABLET(S): 8.6 TABLET ORAL at 21:21

## 2021-10-16 RX ADMIN — Medication 5 MILLIGRAM(S): at 13:35

## 2021-10-16 RX ADMIN — MUPIROCIN 1 APPLICATION(S): 20 OINTMENT TOPICAL at 06:59

## 2021-10-16 RX ADMIN — Medication 400 MILLIGRAM(S): at 11:02

## 2021-10-16 RX ADMIN — Medication 1: at 18:02

## 2021-10-16 RX ADMIN — HYDROMORPHONE HYDROCHLORIDE 0.5 MILLIGRAM(S): 2 INJECTION INTRAMUSCULAR; INTRAVENOUS; SUBCUTANEOUS at 12:27

## 2021-10-16 RX ADMIN — PANTOPRAZOLE SODIUM 40 MILLIGRAM(S): 20 TABLET, DELAYED RELEASE ORAL at 06:19

## 2021-10-16 RX ADMIN — Medication 400 MILLIGRAM(S): at 20:05

## 2021-10-16 RX ADMIN — HEPARIN SODIUM 5000 UNIT(S): 5000 INJECTION INTRAVENOUS; SUBCUTANEOUS at 06:20

## 2021-10-16 RX ADMIN — Medication 62.5 MILLIMOLE(S): at 03:10

## 2021-10-16 RX ADMIN — HYDROMORPHONE HYDROCHLORIDE 0.5 MILLIGRAM(S): 2 INJECTION INTRAMUSCULAR; INTRAVENOUS; SUBCUTANEOUS at 12:41

## 2021-10-16 RX ADMIN — LACTULOSE 10 GRAM(S): 10 SOLUTION ORAL at 15:17

## 2021-10-16 RX ADMIN — Medication 81 MILLIGRAM(S): at 12:29

## 2021-10-16 RX ADMIN — Medication 1000 MILLIGRAM(S): at 12:00

## 2021-10-16 NOTE — PROGRESS NOTE ADULT - SUBJECTIVE AND OBJECTIVE BOX
EDISON ROBLES  MRN-[id number]  Patient is a 71y old  Male who presents with a chief complaint of SOB/Open Heart Surg eval (15 Oct 2021 10:51)    HPI:  Patient is a 70M PMH HTN, CAD s/p CABG in 2009, Complete Heart Block s/p PPM placement, Paroxysmal Afib on coumadin, Aortic stenosis s/p Mechanical AVR who admitted to OSH with acute heart failure exacerbation/sob/LAU.  Patient states he has to stop and catch his breath when walking up stairs. On echo to have low normal EF 50-55%, Mild reduced RV fx, mod-severe MR and severe TR. Transferred to [facility] for open heart surgery with Dr. Hua. Patient endorses shortness of breath and LAU, denies chest pain, abdominal pain, fevers, chills, nausea.  (05 Oct 2021 23:42)      Surgery/Hospital Course:  10/11 MVR, TVR, LAAL, endocardial lead extraction, epicardial lead placement   10/12 TTE: no effusion   10/13 CT: no RP bleed + blood in L testes 1 PRBC OVN, TTE: 12.4 x 9.5 x 13.2 cm hematoma is seen in the right chest with mass effect on the right atrium. The hematoma appears to be in the pericardial space. The pericardial drain is displaced laterally from the hematoma. 1 small pleural effusion with patchy opacities, suggestive of pulmonary edema. Small right groin hematoma. Dilated IVC due to right atrial compression from the hematoma. RTOR for washout.   10/14 Extubated     Today:    REVIEW OF SYSTEMS:  Gen: No fever  EYES/ENT: No visual changes;  No vertigo or throat pain   NECK: No pain   RES:  No shortness of breath or Cough  CARD: No chest pain   GI: No abdominal pain  : No dysuria  NEURO: No weakness  SKIN: No itching, rashes     ICU Vital Signs Last 24 Hrs  T(C): 36.7 (16 Oct 2021 08:00), Max: 37.7 (15 Oct 2021 16:00)  T(F): 98 (16 Oct 2021 08:00), Max: 99.9 (15 Oct 2021 16:00)  HR: 68 (16 Oct 2021 10:00) (59 - 75)  BP: --  BP(mean): --  ABP: 124/49 (16 Oct 2021 10:00) (107/44 - 145/55)  ABP(mean): 73 (16 Oct 2021 10:00) (63 - 87)  RR: 28 (16 Oct 2021 10:00) (13 - 36)  SpO2: 97% (16 Oct 2021 10:00) (91% - 100%)      Physical Exam:  Gen:  Awake, alert   CNS: non focal 	  Neck: no JVD  RES : clear, no wheezing              Chest: +chest tubes   CVS: irregular rhythm. Normal S1/S2  Abd: Soft, non-distended. Bowel sounds present.  Skin: No rash.  Ext:  no edema, R groin hematoma improving, area soft to palpation, + ecchymosis > improving    ============================I/O===========================   I&O's Detail    15 Oct 2021 07:01  -  16 Oct 2021 07:00  --------------------------------------------------------  IN:    DOBUTamine: 14.1 mL    IV PiggyBack: 250 mL    Oral Fluid: 820 mL    sodium chloride 0.9%: 240 mL  Total IN: 1324.1 mL    OUT:    Chest Tube (mL): 230 mL    Chest Tube (mL): 180 mL    Indwelling Catheter - Urethral (mL): 1455 mL  Total OUT: 1865 mL    Total NET: -540.9 mL      16 Oct 2021 07:01  -  16 Oct 2021 10:20  --------------------------------------------------------  IN:    Oral Fluid: 240 mL    sodium chloride 0.9%: 30 mL  Total IN: 270 mL    OUT:    Chest Tube (mL): 0 mL    Chest Tube (mL): 0 mL    Indwelling Catheter - Urethral (mL): 115 mL  Total OUT: 115 mL    Total NET: 155 mL        ============================ LABS =========================                        7.9    12.72 )-----------( 179      ( 16 Oct 2021 00:22 )             25.1     10-16    135  |  100  |  44<H>  ----------------------------<  118<H>  4.4   |  21<L>  |  1.17    Ca    8.5      16 Oct 2021 00:22  Phos  2.2     10-16  Mg     2.5     10-16    TPro  5.6<L>  /  Alb  3.4  /  TBili  0.8  /  DBili  x   /  AST  35  /  ALT  23  /  AlkPhos  106  10-16    LIVER FUNCTIONS - ( 16 Oct 2021 00:22 )  Alb: 3.4 g/dL / Pro: 5.6 g/dL / ALK PHOS: 106 U/L / ALT: 23 U/L / AST: 35 U/L / GGT: x           PT/INR - ( 16 Oct 2021 00:22 )   PT: 11.7 sec;   INR: 0.97 ratio         PTT - ( 16 Oct 2021 00:22 )  PTT:29.2 sec  ABG - ( 16 Oct 2021 00:10 )  pH, Arterial: 7.44  pH, Blood: x     /  pCO2: 39    /  pO2: 66    / HCO3: 26    / Base Excess: 2.2   /  SaO2: 96.2                ======================Micro/Rad/Cardio=================  CXR: Reviewed  Echo:Reviewed  ======================================================  PAST MEDICAL & SURGICAL HISTORY:  HTN (hypertension)    Complete heart block    CAD (coronary artery disease)    Paroxysmal atrial fibrillation    History of mechanical aortic valve replacement    History of pacemaker    S/P CABG (coronary artery bypass graft)      ====================ASSESSMENT ==============  MR/TR, afib s/p MVR, TVR, LAAL, endocardial lead extraction, epicardial lead placement on 10/11   Pericardial effusion with cardiac tamponade, s/p RTOR for sternotomy/washout on 10/13   Hx of tachy-arely syndrome, 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 650 milliGRAM(s) Oral every 6 hours PRN Mild Pain (1 - 3)  fentaNYL    Injectable 25 MICROGram(s) IV Push every 3 hours PRN Severe Pain (7 - 10)  gabapentin 100 milliGRAM(s) Oral every 8 hours  oxyCODONE    IR 10 milliGRAM(s) Oral every 4 hours PRN Severe Pain (7 - 10)  oxyCODONE    IR 5 milliGRAM(s) Oral every 4 hours PRN Moderate Pain (4 - 6)    ==================== RESPIRATORY======================  Receiving supplemental O2 therapy with HFNC  Continue to monitor RR, breathing pattern, pulse ox, and ABG's.  Encourage incentive spirometry.     ====================CARDIOVASCULAR==================  MR/TR, Afib s/p MVR, TVR, LAAL, endocardial lead extraction, epicardial lead placement on 10/11  Prior PPM in place, wires cut; Temporary back up pacing wires in place   CT chest notable for 12.4 x 9.5 x 13.2 cm hematoma seen in R chest with mass effect on the right atrium  s/p RTOR for sternotomy, washout on 10/13   Lactate peaked to 4.3 on 10/11, now 0.9, continue trending   Continue invasive hemodynamic monitoring   ASA/Lipitor for recent valve procedure   Planning for tuesday with EP > PPM generator    atorvastatin 40 milliGRAM(s) Oral at bedtime  aspirin enteric coated 81 milliGRAM(s) Oral daily    ===================HEMATOLOGIC/ONC ===================  Thrombocytopenia and acute blood loss anemia in setting of moderate post op mediastinal bleeding requiring Amicar post op   Noted to have firm ecchymosis around R femoral a line site appears to be improving, will continue to monitor closely.   Monitor H&H/Plts-transfuse as needed   Continue Heparin for venous thromboembolism prophylaxis.     heparin  Infusion 800 Unit(s)/Hr (8 mL/Hr) IV Continuous <Continuous>    ===================== RENAL =========================  Continue monitoring urine output, I&OS, BUN/Cr   Replete lytes PRN. Keep K> 4 and Mg >2   Diuresis with IV Lasix     furosemide   Injectable 20 milliGRAM(s) IV Push once    ==================== GASTROINTESTINAL===================  Tolerating PO consistent carb diet.   Continue Protonix for stress ulcer prophylaxis.   Bowel regimen with Miralax and Senna   Simethicone PRN gas   Reglan for gut motility     magnesium hydroxide Suspension 30 milliLiter(s) Oral daily PRN Constipation  pantoprazole    Tablet 40 milliGRAM(s) Oral before breakfast  polyethylene glycol 3350 17 Gram(s) Oral daily  senna 2 Tablet(s) Oral at bedtime  simethicone 80 milliGRAM(s) Chew every 6 hours PRN Gas  sodium chloride 0.9%. 1000 milliLiter(s) (10 mL/Hr) IV Continuous <Continuous>  metoclopramide Injectable 5 milliGRAM(s) IV Push every 8 hours    =======================    ENDOCRINE  =====================  Stress hyperglycemia, continue glucose control with admelog SS  Synthroid for hypothyroidism     dextrose 50% Injectable 50 milliLiter(s) IV Push every 15 minutes  dextrose 50% Injectable 25 milliLiter(s) IV Push every 15 minutes  insulin lispro (ADMELOG) corrective regimen sliding scale   SubCutaneous three times a day before meals  insulin lispro (ADMELOG) corrective regimen sliding scale   SubCutaneous at bedtime  levothyroxine Injectable 25 MICROGram(s) IV Push at bedtime    ========================INFECTIOUS DISEASE================  Afebrile, WBC downtrending 14.90->12.72  Continue trending WBC and monitoring fever curve     Patient requires continuous monitoring with bedside rhythm monitoring, pulse ox monitoring, and intermittent blood gas analysis. Care plan discussed with ICU care team. Patient remained critical and at risk for life threatening decompensation.     By signing my name below, I, Yajaira Becerril, attest that this documentation has been prepared under the direction and in the presence of SARITA Zhao   Electronically signed: Bill Moya, 10-16-21 @ 10:20    I, Valeriano Diaz, personally performed the services described in this documentation. all medical record entries made by the scribe were at my direction and in my presence. I have reviewed the chart and agree that the record reflects my personal performance and is accurate and complete  Electronically signed: SARITA Zhao        EDISON ROBLES  MRN-[id number]  Patient is a 71y old  Male who presents with a chief complaint of SOB/Open Heart Surg eval (15 Oct 2021 10:51)    HPI:  Patient is a 70M PMH HTN, CAD s/p CABG in 2009, Complete Heart Block s/p PPM placement, Paroxysmal Afib on coumadin, Aortic stenosis s/p Mechanical AVR who admitted to OSH with acute heart failure exacerbation/sob/LAU.  Patient states he has to stop and catch his breath when walking up stairs. On echo to have low normal EF 50-55%, Mild reduced RV fx, mod-severe MR and severe TR. Transferred to [facility] for open heart surgery with Dr. Hua. Patient endorses shortness of breath and LAU, denies chest pain, abdominal pain, fevers, chills, nausea.  (05 Oct 2021 23:42)      Surgery/Hospital Course:  10/11 MVR, TVR, LAAL, endocardial lead extraction, epicardial lead placement   10/12 TTE: no effusion   10/13 CT: no RP bleed + blood in L testes 1 PRBC OVN, TTE: 12.4 x 9.5 x 13.2 cm hematoma is seen in the right chest with mass effect on the right atrium. The hematoma appears to be in the pericardial space. The pericardial drain is displaced laterally from the hematoma. 1 small pleural effusion with patchy opacities, suggestive of pulmonary edema. Small right groin hematoma. Dilated IVC due to right atrial compression from the hematoma. RTOR for washout.   10/14 Extubated     Today:  - Plan for a ECHO this AM   - Plan to start heparin today     REVIEW OF SYSTEMS:  Gen: No fever  EYES/ENT: No visual changes;  No vertigo or throat pain   NECK: No pain   RES:  No shortness of breath or Cough  CARD: No chest pain   GI: No abdominal pain  : No dysuria  NEURO: No weakness  SKIN: No itching, rashes     ICU Vital Signs Last 24 Hrs  T(C): 36.7 (16 Oct 2021 08:00), Max: 37.7 (15 Oct 2021 16:00)  T(F): 98 (16 Oct 2021 08:00), Max: 99.9 (15 Oct 2021 16:00)  HR: 68 (16 Oct 2021 10:00) (59 - 75)  BP: --  BP(mean): --  ABP: 124/49 (16 Oct 2021 10:00) (107/44 - 145/55)  ABP(mean): 73 (16 Oct 2021 10:00) (63 - 87)  RR: 28 (16 Oct 2021 10:00) (13 - 36)  SpO2: 97% (16 Oct 2021 10:00) (91% - 100%)      Physical Exam:  Gen:  Awake, alert   CNS: non focal 	  Neck: no JVD  RES : clear, no wheezing              Chest: +chest tubes   CVS: irregular rhythm. Normal S1/S2  Abd: Soft, non-distended. Bowel sounds present.  Skin: No rash.  Ext:  no edema, R groin hematoma improving, area soft to palpation, + ecchymosis > improving    ============================I/O===========================   I&O's Detail    15 Oct 2021 07:01  -  16 Oct 2021 07:00  --------------------------------------------------------  IN:    DOBUTamine: 14.1 mL    IV PiggyBack: 250 mL    Oral Fluid: 820 mL    sodium chloride 0.9%: 240 mL  Total IN: 1324.1 mL    OUT:    Chest Tube (mL): 230 mL    Chest Tube (mL): 180 mL    Indwelling Catheter - Urethral (mL): 1455 mL  Total OUT: 1865 mL    Total NET: -540.9 mL      16 Oct 2021 07:01  -  16 Oct 2021 10:20  --------------------------------------------------------  IN:    Oral Fluid: 240 mL    sodium chloride 0.9%: 30 mL  Total IN: 270 mL    OUT:    Chest Tube (mL): 0 mL    Chest Tube (mL): 0 mL    Indwelling Catheter - Urethral (mL): 115 mL  Total OUT: 115 mL    Total NET: 155 mL        ============================ LABS =========================                        7.9    12.72 )-----------( 179      ( 16 Oct 2021 00:22 )             25.1     10-16    135  |  100  |  44<H>  ----------------------------<  118<H>  4.4   |  21<L>  |  1.17    Ca    8.5      16 Oct 2021 00:22  Phos  2.2     10-16  Mg     2.5     10-16    TPro  5.6<L>  /  Alb  3.4  /  TBili  0.8  /  DBili  x   /  AST  35  /  ALT  23  /  AlkPhos  106  10-16    LIVER FUNCTIONS - ( 16 Oct 2021 00:22 )  Alb: 3.4 g/dL / Pro: 5.6 g/dL / ALK PHOS: 106 U/L / ALT: 23 U/L / AST: 35 U/L / GGT: x           PT/INR - ( 16 Oct 2021 00:22 )   PT: 11.7 sec;   INR: 0.97 ratio         PTT - ( 16 Oct 2021 00:22 )  PTT:29.2 sec  ABG - ( 16 Oct 2021 00:10 )  pH, Arterial: 7.44  pH, Blood: x     /  pCO2: 39    /  pO2: 66    / HCO3: 26    / Base Excess: 2.2   /  SaO2: 96.2                ======================Micro/Rad/Cardio=================  CXR: Reviewed  Echo:Reviewed  ======================================================  PAST MEDICAL & SURGICAL HISTORY:  HTN (hypertension)    Complete heart block    CAD (coronary artery disease)    Paroxysmal atrial fibrillation    History of mechanical aortic valve replacement    History of pacemaker    S/P CABG (coronary artery bypass graft)      ====================ASSESSMENT ==============  MR/TR, afib s/p MVR, TVR, LAAL, endocardial lead extraction, epicardial lead placement on 10/11   Pericardial effusion with cardiac tamponade, s/p RTOR for sternotomy/washout on 10/13   Hx of tachy-arely syndrome, 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 650 milliGRAM(s) Oral every 6 hours PRN Mild Pain (1 - 3)  fentaNYL    Injectable 25 MICROGram(s) IV Push every 3 hours PRN Severe Pain (7 - 10)  gabapentin 100 milliGRAM(s) Oral every 8 hours  oxyCODONE    IR 10 milliGRAM(s) Oral every 4 hours PRN Severe Pain (7 - 10)  oxyCODONE    IR 5 milliGRAM(s) Oral every 4 hours PRN Moderate Pain (4 - 6)    ==================== RESPIRATORY======================  Receiving supplemental O2 therapy with HFNC  Continue to monitor RR, breathing pattern, pulse ox, and ABG's.  Encourage incentive spirometry.     ====================CARDIOVASCULAR==================  MR/TR, Afib s/p MVR, TVR, LAAL, endocardial lead extraction, epicardial lead placement on 10/11  Prior PPM in place, wires cut; Temporary back up pacing wires in place   CT chest notable for 12.4 x 9.5 x 13.2 cm hematoma seen in R chest with mass effect on the right atrium  s/p RTOR for sternotomy, washout on 10/13   Lactate peaked to 4.3 on 10/11, now 0.9, continue trending   Continue invasive hemodynamic monitoring   ASA/Lipitor for recent valve procedure   Planning for tuesday with EP > PPM generator    atorvastatin 40 milliGRAM(s) Oral at bedtime  aspirin enteric coated 81 milliGRAM(s) Oral daily    ===================HEMATOLOGIC/ONC ===================  Thrombocytopenia and acute blood loss anemia in setting of moderate post op mediastinal bleeding requiring Amicar post op   Noted to have firm ecchymosis around R femoral a line site appears to be improving, will continue to monitor closely.   Monitor H&H/Plts-transfuse as needed   Continue Heparin for venous thromboembolism prophylaxis.     heparin  Infusion 800 Unit(s)/Hr (8 mL/Hr) IV Continuous <Continuous>    ===================== RENAL =========================  Continue monitoring urine output, I&OS, BUN/Cr   Replete lytes PRN. Keep K> 4 and Mg >2   Diuresis with IV Lasix     furosemide   Injectable 20 milliGRAM(s) IV Push once    ==================== GASTROINTESTINAL===================  Tolerating PO consistent carb diet.   Continue Protonix for stress ulcer prophylaxis.   Bowel regimen with Miralax and Senna   Simethicone PRN gas   Reglan for gut motility     magnesium hydroxide Suspension 30 milliLiter(s) Oral daily PRN Constipation  pantoprazole    Tablet 40 milliGRAM(s) Oral before breakfast  polyethylene glycol 3350 17 Gram(s) Oral daily  senna 2 Tablet(s) Oral at bedtime  simethicone 80 milliGRAM(s) Chew every 6 hours PRN Gas  sodium chloride 0.9%. 1000 milliLiter(s) (10 mL/Hr) IV Continuous <Continuous>  metoclopramide Injectable 5 milliGRAM(s) IV Push every 8 hours    =======================    ENDOCRINE  =====================  Stress hyperglycemia, continue glucose control with admelog SS  Synthroid for hypothyroidism     dextrose 50% Injectable 50 milliLiter(s) IV Push every 15 minutes  dextrose 50% Injectable 25 milliLiter(s) IV Push every 15 minutes  insulin lispro (ADMELOG) corrective regimen sliding scale   SubCutaneous three times a day before meals  insulin lispro (ADMELOG) corrective regimen sliding scale   SubCutaneous at bedtime  levothyroxine Injectable 25 MICROGram(s) IV Push at bedtime    ========================INFECTIOUS DISEASE================  Afebrile, WBC downtrending 14.90->12.72  Continue trending WBC and monitoring fever curve     Patient requires continuous monitoring with bedside rhythm monitoring, pulse ox monitoring, and intermittent blood gas analysis. Care plan discussed with ICU care team. Patient remained critical and at risk for life threatening decompensation.     By signing my name below, I, Yajaira Becerril, attest that this documentation has been prepared under the direction and in the presence of SARITA Zhao   Electronically signed: Bill Moya, 10-16-21 @ 10:20    I, Valeriano Diaz, personally performed the services described in this documentation. all medical record entries made by the scribe were at my direction and in my presence. I have reviewed the chart and agree that the record reflects my personal performance and is accurate and complete  Electronically signed: SARITA Zhao        EDISON ROBLES  MRN-[id number]  Patient is a 71y old  Male who presents with a chief complaint of SOB/Open Heart Surg eval (15 Oct 2021 10:51)    HPI:  Patient is a 70M PMH HTN, CAD s/p CABG in 2009, Complete Heart Block s/p PPM placement, Paroxysmal Afib on coumadin, Aortic stenosis s/p Mechanical AVR who admitted to OSH with acute heart failure exacerbation/sob/LAU.  Patient states he has to stop and catch his breath when walking up stairs. On echo to have low normal EF 50-55%, Mild reduced RV fx, mod-severe MR and severe TR. Transferred to [facility] for open heart surgery with Dr. Hua. Patient endorses shortness of breath and LAU, denies chest pain, abdominal pain, fevers, chills, nausea.  (05 Oct 2021 23:42)      Surgery/Hospital Course:  10/11 MVR, TVR, LAAL, endocardial lead extraction, epicardial lead placement   10/12 TTE: no effusion   10/13 CT: no RP bleed + blood in L testes 1 PRBC OVN, TTE: 12.4 x 9.5 x 13.2 cm hematoma is seen in the right chest with mass effect on the right atrium. The hematoma appears to be in the pericardial space. The pericardial drain is displaced laterally from the hematoma. 1 small pleural effusion with patchy opacities, suggestive of pulmonary edema. Small right groin hematoma. Dilated IVC due to right atrial compression from the hematoma. RTOR for washout.   10/14 Extubated     Today:  - Plan for a ECHO this AM   - Plan to start heparin today  - Plan to remove L fem Melba today     REVIEW OF SYSTEMS:  Gen: No fever  EYES/ENT: No visual changes;  No vertigo or throat pain   NECK: No pain   RES:  No shortness of breath or Cough  CARD: No chest pain   GI: No abdominal pain  : No dysuria  NEURO: No weakness  SKIN: No itching, rashes     ICU Vital Signs Last 24 Hrs  T(C): 36.7 (16 Oct 2021 08:00), Max: 37.7 (15 Oct 2021 16:00)  T(F): 98 (16 Oct 2021 08:00), Max: 99.9 (15 Oct 2021 16:00)  HR: 68 (16 Oct 2021 10:00) (59 - 75)  BP: --  BP(mean): --  ABP: 124/49 (16 Oct 2021 10:00) (107/44 - 145/55)  ABP(mean): 73 (16 Oct 2021 10:00) (63 - 87)  RR: 28 (16 Oct 2021 10:00) (13 - 36)  SpO2: 97% (16 Oct 2021 10:00) (91% - 100%)      Physical Exam:  Gen:  Awake, alert   CNS: non focal 	  Neck: no JVD  RES : clear, no wheezing              Chest: +chest tubes   CVS: irregular rhythm. Normal S1/S2  Abd: Soft, non-distended. Bowel sounds present.  Skin: No rash.  Ext:  no edema, R groin hematoma improving, area soft to palpation, + ecchymosis > improving    ============================I/O===========================   I&O's Detail    15 Oct 2021 07:01  -  16 Oct 2021 07:00  --------------------------------------------------------  IN:    DOBUTamine: 14.1 mL    IV PiggyBack: 250 mL    Oral Fluid: 820 mL    sodium chloride 0.9%: 240 mL  Total IN: 1324.1 mL    OUT:    Chest Tube (mL): 230 mL    Chest Tube (mL): 180 mL    Indwelling Catheter - Urethral (mL): 1455 mL  Total OUT: 1865 mL    Total NET: -540.9 mL      16 Oct 2021 07:01  -  16 Oct 2021 10:20  --------------------------------------------------------  IN:    Oral Fluid: 240 mL    sodium chloride 0.9%: 30 mL  Total IN: 270 mL    OUT:    Chest Tube (mL): 0 mL    Chest Tube (mL): 0 mL    Indwelling Catheter - Urethral (mL): 115 mL  Total OUT: 115 mL    Total NET: 155 mL        ============================ LABS =========================                        7.9    12.72 )-----------( 179      ( 16 Oct 2021 00:22 )             25.1     10-16    135  |  100  |  44<H>  ----------------------------<  118<H>  4.4   |  21<L>  |  1.17    Ca    8.5      16 Oct 2021 00:22  Phos  2.2     10-16  Mg     2.5     10-16    TPro  5.6<L>  /  Alb  3.4  /  TBili  0.8  /  DBili  x   /  AST  35  /  ALT  23  /  AlkPhos  106  10-16    LIVER FUNCTIONS - ( 16 Oct 2021 00:22 )  Alb: 3.4 g/dL / Pro: 5.6 g/dL / ALK PHOS: 106 U/L / ALT: 23 U/L / AST: 35 U/L / GGT: x           PT/INR - ( 16 Oct 2021 00:22 )   PT: 11.7 sec;   INR: 0.97 ratio         PTT - ( 16 Oct 2021 00:22 )  PTT:29.2 sec  ABG - ( 16 Oct 2021 00:10 )  pH, Arterial: 7.44  pH, Blood: x     /  pCO2: 39    /  pO2: 66    / HCO3: 26    / Base Excess: 2.2   /  SaO2: 96.2                ======================Micro/Rad/Cardio=================  CXR: Reviewed  Echo:Reviewed  ======================================================  PAST MEDICAL & SURGICAL HISTORY:  HTN (hypertension)    Complete heart block    CAD (coronary artery disease)    Paroxysmal atrial fibrillation    History of mechanical aortic valve replacement    History of pacemaker    S/P CABG (coronary artery bypass graft)      ====================ASSESSMENT ==============  MR/TR, afib s/p MVR, TVR, LAAL, endocardial lead extraction, epicardial lead placement on 10/11   Pericardial effusion with cardiac tamponade, s/p RTOR for sternotomy/washout on 10/13   Hx of tachy-arely syndrome, 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 650 milliGRAM(s) Oral every 6 hours PRN Mild Pain (1 - 3)  fentaNYL    Injectable 25 MICROGram(s) IV Push every 3 hours PRN Severe Pain (7 - 10)  gabapentin 100 milliGRAM(s) Oral every 8 hours  oxyCODONE    IR 10 milliGRAM(s) Oral every 4 hours PRN Severe Pain (7 - 10)  oxyCODONE    IR 5 milliGRAM(s) Oral every 4 hours PRN Moderate Pain (4 - 6)    ==================== RESPIRATORY======================  Receiving supplemental O2 therapy with HFNC  Continue to monitor RR, breathing pattern, pulse ox, and ABG's.  Encourage incentive spirometry.     ====================CARDIOVASCULAR==================  MR/TR, Afib s/p MVR, TVR, LAAL, endocardial lead extraction, epicardial lead placement on 10/11  Prior PPM in place, wires cut; Temporary back up pacing wires in place   CT chest notable for 12.4 x 9.5 x 13.2 cm hematoma seen in R chest with mass effect on the right atrium  s/p RTOR for sternotomy, washout on 10/13   Lactate peaked to 4.3 on 10/11, now 0.9, continue trending   Continue invasive hemodynamic monitoring   ASA/Lipitor for recent valve procedure   Planning for tuesday with EP > PPM generator    atorvastatin 40 milliGRAM(s) Oral at bedtime  aspirin enteric coated 81 milliGRAM(s) Oral daily    ===================HEMATOLOGIC/ONC ===================  Thrombocytopenia and acute blood loss anemia in setting of moderate post op mediastinal bleeding requiring Amicar post op   Noted to have firm ecchymosis around R femoral a line site appears to be improving, will continue to monitor closely.   Monitor H&H/Plts-transfuse as needed   Continue Heparin for venous thromboembolism prophylaxis.     heparin  Infusion 800 Unit(s)/Hr (8 mL/Hr) IV Continuous <Continuous>    ===================== RENAL =========================  Continue monitoring urine output, I&OS, BUN/Cr   Replete lytes PRN. Keep K> 4 and Mg >2   Diuresis with IV Lasix     furosemide   Injectable 20 milliGRAM(s) IV Push once    ==================== GASTROINTESTINAL===================  Tolerating PO consistent carb diet.   Continue Protonix for stress ulcer prophylaxis.   Bowel regimen with Miralax and Senna   Simethicone PRN gas   Reglan for gut motility     magnesium hydroxide Suspension 30 milliLiter(s) Oral daily PRN Constipation  pantoprazole    Tablet 40 milliGRAM(s) Oral before breakfast  polyethylene glycol 3350 17 Gram(s) Oral daily  senna 2 Tablet(s) Oral at bedtime  simethicone 80 milliGRAM(s) Chew every 6 hours PRN Gas  sodium chloride 0.9%. 1000 milliLiter(s) (10 mL/Hr) IV Continuous <Continuous>  metoclopramide Injectable 5 milliGRAM(s) IV Push every 8 hours    =======================    ENDOCRINE  =====================  Stress hyperglycemia, continue glucose control with admelog SS  Synthroid for hypothyroidism     dextrose 50% Injectable 50 milliLiter(s) IV Push every 15 minutes  dextrose 50% Injectable 25 milliLiter(s) IV Push every 15 minutes  insulin lispro (ADMELOG) corrective regimen sliding scale   SubCutaneous three times a day before meals  insulin lispro (ADMELOG) corrective regimen sliding scale   SubCutaneous at bedtime  levothyroxine Injectable 25 MICROGram(s) IV Push at bedtime    ========================INFECTIOUS DISEASE================  Afebrile, WBC downtrending 14.90->12.72  Continue trending WBC and monitoring fever curve     Patient requires continuous monitoring with bedside rhythm monitoring, pulse ox monitoring, and intermittent blood gas analysis. Care plan discussed with ICU care team. Patient remained critical and at risk for life threatening decompensation.     By signing my name below, I, Yajaira Beecrril, attest that this documentation has been prepared under the direction and in the presence of SARITA Zhoa   Electronically signed: Bill Moya, 10-16-21 @ 10:20    I, Valeriano Diaz, personally performed the services described in this documentation. all medical record entries made by the scribe were at my direction and in my presence. I have reviewed the chart and agree that the record reflects my personal performance and is accurate and complete  Electronically signed: SARITA Zhao        EDISON ROBLES  MRN-[id number]  Patient is a 71y old  Male who presents with a chief complaint of SOB/Open Heart Surg eval (15 Oct 2021 10:51)    HPI:  Patient is a 70M PMH HTN, CAD s/p CABG in 2009, Complete Heart Block s/p PPM placement, Paroxysmal Afib on coumadin, Aortic stenosis s/p Mechanical AVR who admitted to OSH with acute heart failure exacerbation/sob/LAU.  Patient states he has to stop and catch his breath when walking up stairs. On echo to have low normal EF 50-55%, Mild reduced RV fx, mod-severe MR and severe TR. Transferred to [facility] for open heart surgery with Dr. Hua. Patient endorses shortness of breath and LAU, denies chest pain, abdominal pain, fevers, chills, nausea.  (05 Oct 2021 23:42)      Surgery/Hospital Course:  10/11 MVR, TVR, LAAL, endocardial lead extraction, epicardial lead placement   10/12 TTE: no effusion   10/13 CT: no RP bleed + blood in L testes 1 PRBC OVN, TTE: 12.4 x 9.5 x 13.2 cm hematoma is seen in the right chest with mass effect on the right atrium. The hematoma appears to be in the pericardial space. The pericardial drain is displaced laterally from the hematoma. 1 small pleural effusion with patchy opacities, suggestive of pulmonary edema. Small right groin hematoma. Dilated IVC due to right atrial compression from the hematoma. RTOR for washout.   10/14 Extubated     Today:  - Plan for a ECHO this AM   - Plan to start heparin today  - Plan to remove L fem Cohagen today   - Wean Oxygen as tolerated    REVIEW OF SYSTEMS:  Gen: No fever  EYES/ENT: No visual changes;  No vertigo or throat pain   NECK: No pain   RES:  No shortness of breath or Cough  CARD: No chest pain   GI: No abdominal pain  : No dysuria  NEURO: No weakness  SKIN: No itching, rashes     ICU Vital Signs Last 24 Hrs  T(C): 36.7 (16 Oct 2021 08:00), Max: 37.7 (15 Oct 2021 16:00)  T(F): 98 (16 Oct 2021 08:00), Max: 99.9 (15 Oct 2021 16:00)  HR: 68 (16 Oct 2021 10:00) (59 - 75)  BP: --  BP(mean): --  ABP: 124/49 (16 Oct 2021 10:00) (107/44 - 145/55)  ABP(mean): 73 (16 Oct 2021 10:00) (63 - 87)  RR: 28 (16 Oct 2021 10:00) (13 - 36)  SpO2: 97% (16 Oct 2021 10:00) (91% - 100%)      Physical Exam:  Gen:  Awake, alert   CNS: non focal 	  Neck: no JVD  RES : clear, no wheezing              Chest: +chest tubes   CVS: irregular rhythm. Normal S1/S2  Abd: Soft, non-distended. Bowel sounds present.  Skin: No rash.  Ext:  no edema, R groin hematoma improving, area soft to palpation, + ecchymosis > improving    ============================I/O===========================   I&O's Detail    15 Oct 2021 07:01  -  16 Oct 2021 07:00  --------------------------------------------------------  IN:    DOBUTamine: 14.1 mL    IV PiggyBack: 250 mL    Oral Fluid: 820 mL    sodium chloride 0.9%: 240 mL  Total IN: 1324.1 mL    OUT:    Chest Tube (mL): 230 mL    Chest Tube (mL): 180 mL    Indwelling Catheter - Urethral (mL): 1455 mL  Total OUT: 1865 mL    Total NET: -540.9 mL      16 Oct 2021 07:01  -  16 Oct 2021 10:20  --------------------------------------------------------  IN:    Oral Fluid: 240 mL    sodium chloride 0.9%: 30 mL  Total IN: 270 mL    OUT:    Chest Tube (mL): 0 mL    Chest Tube (mL): 0 mL    Indwelling Catheter - Urethral (mL): 115 mL  Total OUT: 115 mL    Total NET: 155 mL        ============================ LABS =========================                        7.9    12.72 )-----------( 179      ( 16 Oct 2021 00:22 )             25.1     10-16    135  |  100  |  44<H>  ----------------------------<  118<H>  4.4   |  21<L>  |  1.17    Ca    8.5      16 Oct 2021 00:22  Phos  2.2     10-16  Mg     2.5     10-16    TPro  5.6<L>  /  Alb  3.4  /  TBili  0.8  /  DBili  x   /  AST  35  /  ALT  23  /  AlkPhos  106  10-16    LIVER FUNCTIONS - ( 16 Oct 2021 00:22 )  Alb: 3.4 g/dL / Pro: 5.6 g/dL / ALK PHOS: 106 U/L / ALT: 23 U/L / AST: 35 U/L / GGT: x           PT/INR - ( 16 Oct 2021 00:22 )   PT: 11.7 sec;   INR: 0.97 ratio         PTT - ( 16 Oct 2021 00:22 )  PTT:29.2 sec  ABG - ( 16 Oct 2021 00:10 )  pH, Arterial: 7.44  pH, Blood: x     /  pCO2: 39    /  pO2: 66    / HCO3: 26    / Base Excess: 2.2   /  SaO2: 96.2                ======================Micro/Rad/Cardio=================  CXR: Reviewed  Echo:Reviewed  ======================================================  PAST MEDICAL & SURGICAL HISTORY:  HTN (hypertension)    Complete heart block    CAD (coronary artery disease)    Paroxysmal atrial fibrillation    History of mechanical aortic valve replacement    History of pacemaker    S/P CABG (coronary artery bypass graft)      ====================ASSESSMENT ==============  MR/TR, afib s/p MVR, TVR, LAAL, endocardial lead extraction, epicardial lead placement on 10/11   Pericardial effusion with cardiac tamponade, s/p RTOR for sternotomy/washout on 10/13   Hx of tachy-arely syndrome, 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 650 milliGRAM(s) Oral every 6 hours PRN Mild Pain (1 - 3)  fentaNYL    Injectable 25 MICROGram(s) IV Push every 3 hours PRN Severe Pain (7 - 10)  gabapentin 100 milliGRAM(s) Oral every 8 hours  oxyCODONE    IR 10 milliGRAM(s) Oral every 4 hours PRN Severe Pain (7 - 10)  oxyCODONE    IR 5 milliGRAM(s) Oral every 4 hours PRN Moderate Pain (4 - 6)    ==================== RESPIRATORY======================  Receiving supplemental O2 therapy with HFNC  Continue to monitor RR, breathing pattern, pulse ox, and ABG's.  Encourage incentive spirometry.     ====================CARDIOVASCULAR==================  MR/TR, Afib s/p MVR, TVR, LAAL, endocardial lead extraction, epicardial lead placement on 10/11  Prior PPM in place, wires cut; Temporary back up pacing wires in place   CT chest notable for 12.4 x 9.5 x 13.2 cm hematoma seen in R chest with mass effect on the right atrium  s/p RTOR for sternotomy, washout on 10/13   Lactate peaked to 4.3 on 10/11, now 0.9, continue trending   Continue invasive hemodynamic monitoring   ASA/Lipitor for recent valve procedure   Planning for tuesday with EP > PPM generator    atorvastatin 40 milliGRAM(s) Oral at bedtime  aspirin enteric coated 81 milliGRAM(s) Oral daily    ===================HEMATOLOGIC/ONC ===================  Thrombocytopenia and acute blood loss anemia in setting of moderate post op mediastinal bleeding requiring Amicar post op   Noted to have firm ecchymosis around R femoral a line site appears to be improving, will continue to monitor closely.   Monitor H&H/Plts-transfuse as needed   Continue Heparin for venous thromboembolism prophylaxis.     heparin  Infusion 800 Unit(s)/Hr (8 mL/Hr) IV Continuous <Continuous>    ===================== RENAL =========================  Continue monitoring urine output, I&OS, BUN/Cr   Replete lytes PRN. Keep K> 4 and Mg >2   Diuresis with IV Lasix     furosemide   Injectable 20 milliGRAM(s) IV Push once    ==================== GASTROINTESTINAL===================  Tolerating PO consistent carb diet.   Continue Protonix for stress ulcer prophylaxis.   Bowel regimen with Miralax and Senna   Simethicone PRN gas   Reglan for gut motility     magnesium hydroxide Suspension 30 milliLiter(s) Oral daily PRN Constipation  pantoprazole    Tablet 40 milliGRAM(s) Oral before breakfast  polyethylene glycol 3350 17 Gram(s) Oral daily  senna 2 Tablet(s) Oral at bedtime  simethicone 80 milliGRAM(s) Chew every 6 hours PRN Gas  sodium chloride 0.9%. 1000 milliLiter(s) (10 mL/Hr) IV Continuous <Continuous>  metoclopramide Injectable 5 milliGRAM(s) IV Push every 8 hours    =======================    ENDOCRINE  =====================  Stress hyperglycemia, continue glucose control with admelog SS  Synthroid for hypothyroidism     dextrose 50% Injectable 50 milliLiter(s) IV Push every 15 minutes  dextrose 50% Injectable 25 milliLiter(s) IV Push every 15 minutes  insulin lispro (ADMELOG) corrective regimen sliding scale   SubCutaneous three times a day before meals  insulin lispro (ADMELOG) corrective regimen sliding scale   SubCutaneous at bedtime  levothyroxine Injectable 25 MICROGram(s) IV Push at bedtime    ========================INFECTIOUS DISEASE================  Afebrile, WBC downtrending 14.90->12.72  Continue trending WBC and monitoring fever curve     Patient requires continuous monitoring with bedside rhythm monitoring, pulse ox monitoring, and intermittent blood gas analysis. Care plan discussed with ICU care team. Patient remained critical and at risk for life threatening decompensation.     By signing my name below, I, Yajaira Becerril, attest that this documentation has been prepared under the direction and in the presence of SARITA Zhao   Electronically signed: Bill Moya, 10-16-21 @ 10:20    I, Valeriano Diaz, personally performed the services described in this documentation. all medical record entries made by the scribe were at my direction and in my presence. I have reviewed the chart and agree that the record reflects my personal performance and is accurate and complete  Electronically signed: SARITA Zhao

## 2021-10-17 DIAGNOSIS — Z95.4 PRESENCE OF OTHER HEART-VALVE REPLACEMENT: ICD-10-CM

## 2021-10-17 DIAGNOSIS — Z95.2 PRESENCE OF PROSTHETIC HEART VALVE: ICD-10-CM

## 2021-10-17 LAB
ALBUMIN SERPL ELPH-MCNC: 3.2 G/DL — LOW (ref 3.3–5)
ALP SERPL-CCNC: 128 U/L — HIGH (ref 40–120)
ALT FLD-CCNC: 19 U/L — SIGNIFICANT CHANGE UP (ref 10–45)
ANION GAP SERPL CALC-SCNC: 12 MMOL/L — SIGNIFICANT CHANGE UP (ref 5–17)
APTT BLD: 48 SEC — HIGH (ref 27.5–35.5)
APTT BLD: 54.6 SEC — HIGH (ref 27.5–35.5)
APTT BLD: 77.9 SEC — HIGH (ref 27.5–35.5)
AST SERPL-CCNC: 28 U/L — SIGNIFICANT CHANGE UP (ref 10–40)
BILIRUB SERPL-MCNC: 1.2 MG/DL — SIGNIFICANT CHANGE UP (ref 0.2–1.2)
BUN SERPL-MCNC: 30 MG/DL — HIGH (ref 7–23)
CALCIUM SERPL-MCNC: 8.1 MG/DL — LOW (ref 8.4–10.5)
CHLORIDE SERPL-SCNC: 100 MMOL/L — SIGNIFICANT CHANGE UP (ref 96–108)
CO2 SERPL-SCNC: 25 MMOL/L — SIGNIFICANT CHANGE UP (ref 22–31)
CREAT SERPL-MCNC: 0.97 MG/DL — SIGNIFICANT CHANGE UP (ref 0.5–1.3)
GLUCOSE BLDC GLUCOMTR-MCNC: 109 MG/DL — HIGH (ref 70–99)
GLUCOSE BLDC GLUCOMTR-MCNC: 115 MG/DL — HIGH (ref 70–99)
GLUCOSE BLDC GLUCOMTR-MCNC: 116 MG/DL — HIGH (ref 70–99)
GLUCOSE BLDC GLUCOMTR-MCNC: 119 MG/DL — HIGH (ref 70–99)
GLUCOSE BLDC GLUCOMTR-MCNC: 140 MG/DL — HIGH (ref 70–99)
GLUCOSE BLDC GLUCOMTR-MCNC: 33 MG/DL — CRITICAL LOW (ref 70–99)
GLUCOSE BLDC GLUCOMTR-MCNC: 76 MG/DL — SIGNIFICANT CHANGE UP (ref 70–99)
GLUCOSE SERPL-MCNC: 112 MG/DL — HIGH (ref 70–99)
HCT VFR BLD CALC: 23.4 % — LOW (ref 39–50)
HGB BLD-MCNC: 7.2 G/DL — LOW (ref 13–17)
INR BLD: 1.14 RATIO — SIGNIFICANT CHANGE UP (ref 0.88–1.16)
MAGNESIUM SERPL-MCNC: 2.2 MG/DL — SIGNIFICANT CHANGE UP (ref 1.6–2.6)
MCHC RBC-ENTMCNC: 26.3 PG — LOW (ref 27–34)
MCHC RBC-ENTMCNC: 30.8 GM/DL — LOW (ref 32–36)
MCV RBC AUTO: 85.4 FL — SIGNIFICANT CHANGE UP (ref 80–100)
NRBC # BLD: 0 /100 WBCS — SIGNIFICANT CHANGE UP (ref 0–0)
PHOSPHATE SERPL-MCNC: 2.1 MG/DL — LOW (ref 2.5–4.5)
PLATELET # BLD AUTO: 191 K/UL — SIGNIFICANT CHANGE UP (ref 150–400)
POTASSIUM SERPL-MCNC: 3.5 MMOL/L — SIGNIFICANT CHANGE UP (ref 3.5–5.3)
POTASSIUM SERPL-MCNC: 3.9 MMOL/L — SIGNIFICANT CHANGE UP (ref 3.5–5.3)
POTASSIUM SERPL-SCNC: 3.5 MMOL/L — SIGNIFICANT CHANGE UP (ref 3.5–5.3)
POTASSIUM SERPL-SCNC: 3.9 MMOL/L — SIGNIFICANT CHANGE UP (ref 3.5–5.3)
PROT SERPL-MCNC: 5.4 G/DL — LOW (ref 6–8.3)
PROTHROM AB SERPL-ACNC: 13.6 SEC — SIGNIFICANT CHANGE UP (ref 10.6–13.6)
RBC # BLD: 2.74 M/UL — LOW (ref 4.2–5.8)
RBC # FLD: 22.1 % — HIGH (ref 10.3–14.5)
SODIUM SERPL-SCNC: 137 MMOL/L — SIGNIFICANT CHANGE UP (ref 135–145)
WBC # BLD: 13.8 K/UL — HIGH (ref 3.8–10.5)
WBC # FLD AUTO: 13.8 K/UL — HIGH (ref 3.8–10.5)

## 2021-10-17 PROCEDURE — 71045 X-RAY EXAM CHEST 1 VIEW: CPT | Mod: 26

## 2021-10-17 PROCEDURE — 99291 CRITICAL CARE FIRST HOUR: CPT

## 2021-10-17 RX ORDER — POTASSIUM CHLORIDE 20 MEQ
20 PACKET (EA) ORAL ONCE
Refills: 0 | Status: COMPLETED | OUTPATIENT
Start: 2021-10-17 | End: 2021-10-17

## 2021-10-17 RX ORDER — POTASSIUM PHOSPHATE, MONOBASIC POTASSIUM PHOSPHATE, DIBASIC 236; 224 MG/ML; MG/ML
15 INJECTION, SOLUTION INTRAVENOUS ONCE
Refills: 0 | Status: COMPLETED | OUTPATIENT
Start: 2021-10-17 | End: 2021-10-17

## 2021-10-17 RX ORDER — ACETAMINOPHEN 500 MG
975 TABLET ORAL EVERY 8 HOURS
Refills: 0 | Status: DISCONTINUED | OUTPATIENT
Start: 2021-10-17 | End: 2021-10-19

## 2021-10-17 RX ORDER — LEVOTHYROXINE SODIUM 125 MCG
25 TABLET ORAL DAILY
Refills: 0 | Status: DISCONTINUED | OUTPATIENT
Start: 2021-10-17 | End: 2021-10-19

## 2021-10-17 RX ORDER — SODIUM CHLORIDE 9 MG/ML
3 INJECTION INTRAMUSCULAR; INTRAVENOUS; SUBCUTANEOUS EVERY 8 HOURS
Refills: 0 | Status: DISCONTINUED | OUTPATIENT
Start: 2021-10-17 | End: 2021-10-19

## 2021-10-17 RX ORDER — METOPROLOL TARTRATE 50 MG
12.5 TABLET ORAL
Refills: 0 | Status: DISCONTINUED | OUTPATIENT
Start: 2021-10-17 | End: 2021-10-19

## 2021-10-17 RX ADMIN — Medication 20 MILLIEQUIVALENT(S): at 21:32

## 2021-10-17 RX ADMIN — PANTOPRAZOLE SODIUM 40 MILLIGRAM(S): 20 TABLET, DELAYED RELEASE ORAL at 06:02

## 2021-10-17 RX ADMIN — POLYETHYLENE GLYCOL 3350 17 GRAM(S): 17 POWDER, FOR SOLUTION ORAL at 11:23

## 2021-10-17 RX ADMIN — Medication 20 MILLIGRAM(S): at 17:36

## 2021-10-17 RX ADMIN — Medication 81 MILLIGRAM(S): at 11:23

## 2021-10-17 RX ADMIN — Medication 12.5 MILLIGRAM(S): at 20:02

## 2021-10-17 RX ADMIN — CHLORHEXIDINE GLUCONATE 1 APPLICATION(S): 213 SOLUTION TOPICAL at 11:11

## 2021-10-17 RX ADMIN — OXYCODONE HYDROCHLORIDE 10 MILLIGRAM(S): 5 TABLET ORAL at 20:02

## 2021-10-17 RX ADMIN — SODIUM CHLORIDE 3 MILLILITER(S): 9 INJECTION INTRAMUSCULAR; INTRAVENOUS; SUBCUTANEOUS at 21:30

## 2021-10-17 RX ADMIN — OXYCODONE HYDROCHLORIDE 10 MILLIGRAM(S): 5 TABLET ORAL at 08:00

## 2021-10-17 RX ADMIN — POTASSIUM PHOSPHATE, MONOBASIC POTASSIUM PHOSPHATE, DIBASIC 62.5 MILLIMOLE(S): 236; 224 INJECTION, SOLUTION INTRAVENOUS at 03:12

## 2021-10-17 RX ADMIN — Medication 20 MILLIGRAM(S): at 05:29

## 2021-10-17 RX ADMIN — ATORVASTATIN CALCIUM 40 MILLIGRAM(S): 80 TABLET, FILM COATED ORAL at 21:33

## 2021-10-17 RX ADMIN — OXYCODONE HYDROCHLORIDE 10 MILLIGRAM(S): 5 TABLET ORAL at 21:00

## 2021-10-17 RX ADMIN — Medication 975 MILLIGRAM(S): at 21:32

## 2021-10-17 RX ADMIN — Medication 5 MILLIGRAM(S): at 05:30

## 2021-10-17 RX ADMIN — OXYCODONE HYDROCHLORIDE 10 MILLIGRAM(S): 5 TABLET ORAL at 08:30

## 2021-10-17 NOTE — PROGRESS NOTE ADULT - ASSESSMENT
Patient is a 70M PMH HTN, CAD s/p CABG 10 years ago per patient , Complete Heart Block s/p PPM placement, Paroxysmal Afib on coumadin, Aortic stenosis s/p Mechanical AVR who admitted to OSH with acute heart failure exacerbation found on echo to have low normal EF 50-55%, severely dilated RA/RV/pulm HTN reduced RV fx, severe MR and severe TR. Transferred to [facility] for open heart surgery with Dr. Hua.     On 10/11/21, pt underwent Redo sternotomy MVR-t/TVR-t/RENÉE LIGATION/EPICARDIAL LEAD PLACEMENT  post-op bleeding  10/13 RTOR for evacuation of tamponade/hematoma/washout  extubated to [facility] now on n/c  a/c with heparin gtt only (PT HAS H/O AVR-MECHANICAL) - AS Pt. will need a PPM generator change on Tuesday, October 19 - seen by Dr. Den de la paz.   statin/ppi/no bb yet - will most likely resume after PPM generator change - was on Toprol preop  med CT - d/c Monday?

## 2021-10-17 NOTE — PROGRESS NOTE ADULT - PROBLEM SELECTOR PLAN 2
CHB s/p [employer] ADAPTA DC PPM   EP consulted for Pacemaker battery change on Tuesday, October 19  hold coumadin   continue heparin gtt

## 2021-10-17 NOTE — PROGRESS NOTE ADULT - SUBJECTIVE AND OBJECTIVE BOX
EDISON ROBLES  MRN-[id number]  Patient is a 71y old  Male who presents with a chief complaint of SOB/Open Heart Surg eval (16 Oct 2021 10:20)    HPI:  Patient is a 70M PMH HTN, CAD s/p CABG in 2009, Complete Heart Block s/p PPM placement, Paroxysmal Afib on coumadin, Aortic stenosis s/p Mechanical AVR who admitted to OSH with acute heart failure exacerbation/sob/LAU.  Patient states he has to stop and catch his breath when walking up stairs. On echo to have low normal EF 50-55%, Mild reduced RV fx, mod-severe MR and severe TR. Transferred to [facility] for open heart surgery with Dr. Hua. Patient endorses shortness of breath and LAU, denies chest pain, abdominal pain, fevers, chills, nausea.  (05 Oct 2021 23:42)      Surgery/Hospital Course:  10/11 MVR, TVR, LAAL, endocardial lead extraction, epicardial lead placement   10/12 TTE: no effusion   10/13 CT: no RP bleed + blood in L testes 1 PRBC OVN, TTE: 12.4 x 9.5 x 13.2 cm hematoma is seen in the right chest with mass effect on the right atrium. The hematoma appears to be in the pericardial space. The pericardial drain is displaced laterally from the hematoma. 1 small pleural effusion with patchy opacities, suggestive of pulmonary edema. Small right groin hematoma. Dilated IVC due to right atrial compression from the hematoma. RTOR for washout.   10/14 Extubated     Today:    REVIEW OF SYSTEMS:  Gen: No fever  EYES/ENT: No visual changes;  No vertigo or throat pain   NECK: No pain   RES:  No shortness of breath or Cough  CARD: No chest pain   GI: No abdominal pain  : No dysuria  NEURO: No weakness  SKIN: No itching, rashes     ICU Vital Signs Last 24 Hrs  T(C): 37.2 (17 Oct 2021 04:00), Max: 37.7 (16 Oct 2021 20:00)  T(F): 99 (17 Oct 2021 04:00), Max: 99.9 (16 Oct 2021 20:00)  HR: 73 (17 Oct 2021 06:00) (63 - 75)  BP: 96/55 (17 Oct 2021 05:00) (87/50 - 114/61)  BP(mean): 71 (17 Oct 2021 05:00) (65 - 81)  ABP: 105/45 (16 Oct 2021 12:00) (105/45 - 135/54)  ABP(mean): 64 (16 Oct 2021 12:00) (63 - 79)  RR: 16 (17 Oct 2021 06:00) (13 - 31)  SpO2: 97% (17 Oct 2021 06:00) (92% - 100%)      Physical Exam:  Gen:  Awake, alert   CNS: non focal 	  Neck: no JVD  RES : clear , no wheezing              CVS: Regular  rhythm. Normal S1/S2  Abd: Soft, non-distended. Bowel sounds present.  Skin: No rash.  Ext:  no edema, R groin hematoma improving, area soft to palpation, + ecchymosis > improving    ============================I/O===========================   I&O's Detail    16 Oct 2021 07:01  -  17 Oct 2021 07:00  --------------------------------------------------------  IN:    Heparin: 121 mL    IV PiggyBack: 350 mL    Oral Fluid: 340 mL    sodium chloride 0.9%: 160 mL  Total IN: 971 mL    OUT:    Chest Tube (mL): 0 mL    Chest Tube (mL): 90 mL    Indwelling Catheter - Urethral (mL): 2600 mL  Total OUT: 2690 mL    Total NET: -1719 mL        ============================ LABS =========================                        7.2    13.80 )-----------( 191      ( 17 Oct 2021 00:32 )             23.4     10-17    137  |  100  |  30<H>  ----------------------------<  112<H>  3.5   |  25  |  0.97    Ca    8.1<L>      17 Oct 2021 00:30  Phos  2.1     10-17  Mg     2.2     10-17    TPro  5.4<L>  /  Alb  3.2<L>  /  TBili  1.2  /  DBili  x   /  AST  28  /  ALT  19  /  AlkPhos  128<H>  10-17    LIVER FUNCTIONS - ( 17 Oct 2021 00:30 )  Alb: 3.2 g/dL / Pro: 5.4 g/dL / ALK PHOS: 128 U/L / ALT: 19 U/L / AST: 28 U/L / GGT: x           PT/INR - ( 17 Oct 2021 04:28 )   PT: 13.6 sec;   INR: 1.14 ratio         PTT - ( 17 Oct 2021 04:28 )  PTT:77.9 sec  ABG - ( 16 Oct 2021 00:10 )  pH, Arterial: 7.44  pH, Blood: x     /  pCO2: 39    /  pO2: 66    / HCO3: 26    / Base Excess: 2.2   /  SaO2: 96.2                ======================Micro/Rad/Cardio=================  CXR: Reviewed  Echo:Reviewed  ======================================================  PAST MEDICAL & SURGICAL HISTORY:  HTN (hypertension)    Complete heart block    CAD (coronary artery disease)    Paroxysmal atrial fibrillation    History of mechanical aortic valve replacement    History of pacemaker    S/P CABG (coronary artery bypass graft)      ====================ASSESSMENT ==============  MR/TR, afib s/p MVR, TVR, LAAL, endocardial lead extraction, epicardial lead placement on 10/11   Pericardial effusion with cardiac tamponade, s/p RTOR for sternotomy/washout on 10/13   Hx of tachy-arely syndrome, 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 650 milliGRAM(s) Oral every 6 hours PRN Mild Pain (1 - 3)  oxyCODONE    IR 10 milliGRAM(s) Oral every 4 hours PRN Severe Pain (7 - 10)  oxyCODONE    IR 5 milliGRAM(s) Oral every 4 hours PRN Moderate Pain (4 - 6)    ==================== RESPIRATORY======================  Receiving supplemental O2 therapy with HFNC  Continue to monitor RR, breathing pattern, pulse ox, and ABG's.  Encourage incentive spirometry.      ====================CARDIOVASCULAR==================  MR/TR, Afib s/p MVR, TVR, LAAL, endocardial lead extraction, epicardial lead placement on 10/11  Prior PPM in place, wires cut; Temporary back up pacing wires in place   CT chest notable for 12.4 x 9.5 x 13.2 cm hematoma seen in R chest with mass effect on the right atrium  s/p RTOR for sternotomy, washout on 10/13   Lactate peaked to 4.3 on 10/11, now 0.9, continue trending   Continue invasive hemodynamic monitoring   ASA/Lipitor for recent valve procedure   Planning for tuesday with EP > PPM generator    atorvastatin 40 milliGRAM(s) Oral at bedtime  aspirin enteric coated 81 milliGRAM(s) Oral daily    ===================HEMATOLOGIC/ONC ===================  Thrombocytopenia and acute blood loss anemia in setting of moderate post op mediastinal bleeding requiring Amicar post op   Noted to have firm ecchymosis around R femoral a line site appears to be improving, will continue to monitor closely.   Monitor H&H/Plts-transfuse as needed   Continue Heparin for venous thromboembolism prophylaxis.     heparin  Infusion 800 Unit(s)/Hr (6.5 mL/Hr) IV Continuous <Continuous>    ===================== RENAL =========================  Continue monitoring urine output, I&OS, BUN/Cr   Replete lytes PRN. Keep K> 4 and Mg >2   Diuresis with IV Lasix     furosemide    Tablet 20 milliGRAM(s) Oral two times a day    ==================== GASTROINTESTINAL===================  Tolerating PO consistent carb diet.   Continue Protonix for stress ulcer prophylaxis.   Bowel regimen with Miralax and Senna   Simethicone PRN gas   Reglan for gut motility     magnesium hydroxide Suspension 30 milliLiter(s) Oral daily PRN Constipation  pantoprazole    Tablet 40 milliGRAM(s) Oral before breakfast  polyethylene glycol 3350 17 Gram(s) Oral daily  senna 2 Tablet(s) Oral at bedtime  simethicone 80 milliGRAM(s) Chew every 6 hours PRN Gas  sodium chloride 0.9%. 1000 milliLiter(s) (10 mL/Hr) IV Continuous <Continuous>    =======================    ENDOCRINE  =====================  Stress hyperglycemia, continue glucose control with admelog SS  Synthroid for hypothyroidism     dextrose 50% Injectable 50 milliLiter(s) IV Push every 15 minutes  insulin lispro (ADMELOG) corrective regimen sliding scale   SubCutaneous three times a day before meals  insulin lispro (ADMELOG) corrective regimen sliding scale   SubCutaneous at bedtime  levothyroxine Injectable 25 MICROGram(s) IV Push at bedtime    ========================INFECTIOUS DISEASE================  Temp 99F, WBC downtrending 14.81 -> 13.80   Continue trending WBC and monitoring fever curve     Patient requires continuous monitoring with bedside rhythm monitoring, pulse ox monitoring, and intermittent blood gas analysis. Care plan discussed with ICU care team. Patient remained critical and at risk for life threatening decompensation.     By signing my name below, I, Yajaira Becerril, attest that this documentation has been prepared under the direction and in the presence of SARITA Zhao   Electronically signed: Bill Moya, 10-17-21 @ 07:55    I, Valeriano Diaz, personally performed the services described in this documentation. all medical record entries made by the scribe were at my direction and in my presence. I have reviewed the chart and agree that the record reflects my personal performance and is accurate and complete  Electronically signed: SARITA Zhao        EDISON ROBLES  MRN-[id number]  Patient is a 71y old  Male who presents with a chief complaint of SOB/Open Heart Surg eval (16 Oct 2021 10:20)    HPI:  Patient is a 70M PMH HTN, CAD s/p CABG in 2009, Complete Heart Block s/p PPM placement, Paroxysmal Afib on coumadin, Aortic stenosis s/p Mechanical AVR who admitted to OSH with acute heart failure exacerbation/sob/LAU.  Patient states he has to stop and catch his breath when walking up stairs. On echo to have low normal EF 50-55%, Mild reduced RV fx, mod-severe MR and severe TR. Transferred to [facility] for open heart surgery with Dr. Hua. Patient endorses shortness of breath and LAU, denies chest pain, abdominal pain, fevers, chills, nausea.  (05 Oct 2021 23:42)      Surgery/Hospital Course:  10/11 MVR, TVR, LAAL, endocardial lead extraction, epicardial lead placement   10/12 TTE: no effusion   10/13 CT: no RP bleed + blood in L testes 1 PRBC OVN, TTE: 12.4 x 9.5 x 13.2 cm hematoma is seen in the right chest with mass effect on the right atrium. The hematoma appears to be in the pericardial space. The pericardial drain is displaced laterally from the hematoma. 1 small pleural effusion with patchy opacities, suggestive of pulmonary edema. Small right groin hematoma. Dilated IVC due to right atrial compression from the hematoma. RTOR for washout.   10/14 Extubated     Today:  Deintensified, IJ and Barillas removed, weaned oxygen to Nasal cannula. Continue ambulation and PT. TTE yesterday showed moderate effusion without tamponade, will keep mediastinal drain in place. Pt HD stable.      REVIEW OF SYSTEMS:  Gen: No fever  EYES/ENT: No visual changes;  No vertigo or throat pain   NECK: No pain   RES:  No shortness of breath or Cough  CARD: No chest pain   GI: No abdominal pain  : No dysuria  NEURO: No weakness  SKIN: No itching, rashes     ICU Vital Signs Last 24 Hrs  T(C): 37.2 (17 Oct 2021 04:00), Max: 37.7 (16 Oct 2021 20:00)  T(F): 99 (17 Oct 2021 04:00), Max: 99.9 (16 Oct 2021 20:00)  HR: 73 (17 Oct 2021 06:00) (63 - 75)  BP: 96/55 (17 Oct 2021 05:00) (87/50 - 114/61)  BP(mean): 71 (17 Oct 2021 05:00) (65 - 81)  ABP: 105/45 (16 Oct 2021 12:00) (105/45 - 135/54)  ABP(mean): 64 (16 Oct 2021 12:00) (63 - 79)  RR: 16 (17 Oct 2021 06:00) (13 - 31)  SpO2: 97% (17 Oct 2021 06:00) (92% - 100%)      Physical Exam:  Gen:  Awake, alert   CNS: non focal 	  Neck: no JVD  RES : clear , no wheezing              CVS: Regular  rhythm. Normal S1/S2  Abd: Soft, non-distended. Bowel sounds present.  Skin: No rash.  Ext:  no edema, R groin hematoma improving, area soft to palpation, + ecchymosis > improving    ============================I/O===========================   I&O's Detail    16 Oct 2021 07:01  -  17 Oct 2021 07:00  --------------------------------------------------------  IN:    Heparin: 121 mL    IV PiggyBack: 350 mL    Oral Fluid: 340 mL    sodium chloride 0.9%: 160 mL  Total IN: 971 mL    OUT:    Chest Tube (mL): 0 mL    Chest Tube (mL): 90 mL    Indwelling Catheter - Urethral (mL): 2600 mL  Total OUT: 2690 mL    Total NET: -1719 mL        ============================ LABS =========================                        7.2    13.80 )-----------( 191      ( 17 Oct 2021 00:32 )             23.4     10-17    137  |  100  |  30<H>  ----------------------------<  112<H>  3.5   |  25  |  0.97    Ca    8.1<L>      17 Oct 2021 00:30  Phos  2.1     10-17  Mg     2.2     10-17    TPro  5.4<L>  /  Alb  3.2<L>  /  TBili  1.2  /  DBili  x   /  AST  28  /  ALT  19  /  AlkPhos  128<H>  10-17    LIVER FUNCTIONS - ( 17 Oct 2021 00:30 )  Alb: 3.2 g/dL / Pro: 5.4 g/dL / ALK PHOS: 128 U/L / ALT: 19 U/L / AST: 28 U/L / GGT: x           PT/INR - ( 17 Oct 2021 04:28 )   PT: 13.6 sec;   INR: 1.14 ratio         PTT - ( 17 Oct 2021 04:28 )  PTT:77.9 sec  ABG - ( 16 Oct 2021 00:10 )  pH, Arterial: 7.44  pH, Blood: x     /  pCO2: 39    /  pO2: 66    / HCO3: 26    / Base Excess: 2.2   /  SaO2: 96.2                ======================Micro/Rad/Cardio=================  CXR: Reviewed  Echo:Reviewed  ======================================================  PAST MEDICAL & SURGICAL HISTORY:  HTN (hypertension)    Complete heart block    CAD (coronary artery disease)    Paroxysmal atrial fibrillation    History of mechanical aortic valve replacement    History of pacemaker    S/P CABG (coronary artery bypass graft)      ====================ASSESSMENT ==============  MR/TR, afib s/p MVR, TVR, LAAL, endocardial lead extraction, epicardial lead placement on 10/11   Pericardial effusion with cardiac tamponade, s/p RTOR for sternotomy/washout on 10/13   Hx of tachy-arely syndrome, 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 650 milliGRAM(s) Oral every 6 hours PRN Mild Pain (1 - 3)  oxyCODONE    IR 10 milliGRAM(s) Oral every 4 hours PRN Severe Pain (7 - 10)  oxyCODONE    IR 5 milliGRAM(s) Oral every 4 hours PRN Moderate Pain (4 - 6)    ==================== RESPIRATORY======================  Receiving supplemental O2 therapy with HFNC  Continue to monitor RR, breathing pattern, pulse ox, and ABG's.  Encourage incentive spirometry.      ====================CARDIOVASCULAR==================  MR/TR, Afib s/p MVR, TVR, LAAL, endocardial lead extraction, epicardial lead placement on 10/11  Prior PPM in place, wires cut; Temporary back up pacing wires in place   CT chest notable for 12.4 x 9.5 x 13.2 cm hematoma seen in R chest with mass effect on the right atrium  s/p RTOR for sternotomy, washout on 10/13   Lactate peaked to 4.3 on 10/11, now 0.9, continue trending   Continue invasive hemodynamic monitoring   ASA/Lipitor for recent valve procedure   Planning for tuesday with EP > PPM generator    atorvastatin 40 milliGRAM(s) Oral at bedtime  aspirin enteric coated 81 milliGRAM(s) Oral daily    ===================HEMATOLOGIC/ONC ===================  Thrombocytopenia and acute blood loss anemia in setting of moderate post op mediastinal bleeding requiring Amicar post op   Noted to have firm ecchymosis around R femoral a line site appears to be improving, will continue to monitor closely.   Monitor H&H/Plts-transfuse as needed   Continue Heparin for venous thromboembolism prophylaxis.     heparin  Infusion 800 Unit(s)/Hr (6.5 mL/Hr) IV Continuous <Continuous>    ===================== RENAL =========================  Continue monitoring urine output, I&OS, BUN/Cr   Replete lytes PRN. Keep K> 4 and Mg >2   Diuresis with IV Lasix     furosemide    Tablet 20 milliGRAM(s) Oral two times a day    ==================== GASTROINTESTINAL===================  Tolerating PO consistent carb diet.   Continue Protonix for stress ulcer prophylaxis.   Bowel regimen with Miralax and Senna   Simethicone PRN gas   Reglan for gut motility     magnesium hydroxide Suspension 30 milliLiter(s) Oral daily PRN Constipation  pantoprazole    Tablet 40 milliGRAM(s) Oral before breakfast  polyethylene glycol 3350 17 Gram(s) Oral daily  senna 2 Tablet(s) Oral at bedtime  simethicone 80 milliGRAM(s) Chew every 6 hours PRN Gas  sodium chloride 0.9%. 1000 milliLiter(s) (10 mL/Hr) IV Continuous <Continuous>    =======================    ENDOCRINE  =====================  Stress hyperglycemia, continue glucose control with admelog SS  Synthroid for hypothyroidism     dextrose 50% Injectable 50 milliLiter(s) IV Push every 15 minutes  insulin lispro (ADMELOG) corrective regimen sliding scale   SubCutaneous three times a day before meals  insulin lispro (ADMELOG) corrective regimen sliding scale   SubCutaneous at bedtime  levothyroxine Injectable 25 MICROGram(s) IV Push at bedtime    ========================INFECTIOUS DISEASE================  Temp 99F, WBC downtrending 14.81 -> 13.80   Continue trending WBC and monitoring fever curve     Patient requires continuous monitoring with bedside rhythm monitoring, pulse ox monitoring, and intermittent blood gas analysis. Care plan discussed with ICU care team. Patient remained critical and at risk for life threatening decompensation.     By signing my name below, I, Yajaira Becerril, attest that this documentation has been prepared under the direction and in the presence of SARITA Zhao   Electronically signed: Bill Moya, 10-17-21 @ 07:55    I, Valeriano Diaz, personally performed the services described in this documentation. all medical record entries made by the ezibe were at my direction and in my presence. I have reviewed the chart and agree that the record reflects my personal performance and is accurate and complete  Electronically signed: SARITA Zhao

## 2021-10-17 NOTE — PROGRESS NOTE ADULT - SUBJECTIVE AND OBJECTIVE BOX
VITAL SIGNS-Telemetry:  AFib 70s  Vital Signs Last 24 Hrs  T(C): 36.2 (10-17-21 @ 18:19), Max: 37.7 (10-16-21 @ 20:00)  T(F): 97.2 (10-17-21 @ 18:19), Max: 99.9 (10-16-21 @ 20:00)  HR: 74 (10-17-21 @ 18:19) (69 - 77)  BP: 102/60 (10-17-21 @ 18:19) (87/50 - 125/59)  RR: 22 (10-17-21 @ 18:19) (14 - 34)  SpO2: 98% (10-17-21 @ 18:19) (76% - 100%)         10-16 @ 07:01  -  10-17 @ 07:00  --------------------------------------------------------  IN: 981 mL / OUT: 2765 mL / NET: -1784 mL    10-17 @ 07:01  -  10-17 @ 19:18  --------------------------------------------------------  IN: 151.5 mL / OUT: 445 mL / NET: -293.5 mL    Daily     Daily Weight in k.4 (17 Oct 2021 00:00)    CAPILLARY BLOOD GLUCOSE  POCT Blood Glucose.: 115 mg/dL (17 Oct 2021 16:39)  POCT Blood Glucose.: 140 mg/dL (17 Oct 2021 11:15)  POCT Blood Glucose.: 109 mg/dL (17 Oct 2021 07:46)  POCT Blood Glucose.: 33 mg/dL (17 Oct 2021 07:43)        Drains:     MS         [x  ] Drainage:                 Pacing Wires        [ x ]   Settings:                                  Isolated  [  ]  Coumadin    [ ] YES          [  ]      NO         Reason:     HOLD UNTIL PPM GENERATOR CHANGE TUE 10/19  PHYSICAL EXAM:  Neurology: alert and oriented x 3, nonfocal, no gross deficits  CV : irr irr  Sternal Wound :  CDI , Stable- DRESSING IN PLACE  Lungs: crackles bases  Abdomen: soft, nontender, nondistended, positive bowel sounds, last bowel movement       10/16  Extremities:    no edema , no calf tenderness- right groin suture w/ large ecchymotic area (s/p cath) w/ ecchymotic scrotum.      acetaminophen   Tablet .. 650 milliGRAM(s) Oral every 6 hours PRN  aspirin enteric coated 81 milliGRAM(s) Oral daily  atorvastatin 40 milliGRAM(s) Oral at bedtime  chlorhexidine 2% Cloths 1 Application(s) Topical daily  dextrose 50% Injectable 50 milliLiter(s) IV Push every 15 minutes  furosemide    Tablet 20 milliGRAM(s) Oral two times a day  heparin  Infusion 800 Unit(s)/Hr IV Continuous <Continuous>  insulin lispro (ADMELOG) corrective regimen sliding scale   SubCutaneous three times a day before meals  insulin lispro (ADMELOG) corrective regimen sliding scale   SubCutaneous at bedtime  levothyroxine 25 MICROGram(s) Oral daily  oxyCODONE    IR 5 milliGRAM(s) Oral every 4 hours PRN  oxyCODONE    IR 10 milliGRAM(s) Oral every 4 hours PRN  pantoprazole    Tablet 40 milliGRAM(s) Oral before breakfast  polyethylene glycol 3350 17 Gram(s) Oral daily  senna 2 Tablet(s) Oral at bedtime  simethicone 80 milliGRAM(s) Chew every 6 hours PRN  sodium chloride 0.9% lock flush 3 milliLiter(s) IV Push every 8 hours  sodium chloride 0.9%. 1000 milliLiter(s) IV Continuous <Continuous>    Physical Therapy Rec:   Home  [  ]   Home w/ PT  [  ]  Rehab  [  ]  Discussed with Cardiothoracic Team at AM rounds.

## 2021-10-18 LAB
ANION GAP SERPL CALC-SCNC: 13 MMOL/L — SIGNIFICANT CHANGE UP (ref 5–17)
ANISOCYTOSIS BLD QL: SLIGHT — SIGNIFICANT CHANGE UP
APTT BLD: 54 SEC — HIGH (ref 27.5–35.5)
APTT BLD: 57.4 SEC — HIGH (ref 27.5–35.5)
BASOPHILS # BLD AUTO: 0 K/UL — SIGNIFICANT CHANGE UP (ref 0–0.2)
BASOPHILS NFR BLD AUTO: 0 % — SIGNIFICANT CHANGE UP (ref 0–2)
BLD GP AB SCN SERPL QL: NEGATIVE — SIGNIFICANT CHANGE UP
BUN SERPL-MCNC: 27 MG/DL — HIGH (ref 7–23)
CALCIUM SERPL-MCNC: 8.3 MG/DL — LOW (ref 8.4–10.5)
CHLORIDE SERPL-SCNC: 99 MMOL/L — SIGNIFICANT CHANGE UP (ref 96–108)
CO2 SERPL-SCNC: 24 MMOL/L — SIGNIFICANT CHANGE UP (ref 22–31)
CREAT SERPL-MCNC: 0.96 MG/DL — SIGNIFICANT CHANGE UP (ref 0.5–1.3)
DACRYOCYTES BLD QL SMEAR: SLIGHT — SIGNIFICANT CHANGE UP
ELLIPTOCYTES BLD QL SMEAR: SLIGHT — SIGNIFICANT CHANGE UP
EOSINOPHIL # BLD AUTO: 0.95 K/UL — HIGH (ref 0–0.5)
EOSINOPHIL NFR BLD AUTO: 7 % — HIGH (ref 0–6)
GIANT PLATELETS BLD QL SMEAR: PRESENT — SIGNIFICANT CHANGE UP
GLUCOSE BLDC GLUCOMTR-MCNC: 107 MG/DL — HIGH (ref 70–99)
GLUCOSE BLDC GLUCOMTR-MCNC: 120 MG/DL — HIGH (ref 70–99)
GLUCOSE BLDC GLUCOMTR-MCNC: 132 MG/DL — HIGH (ref 70–99)
GLUCOSE BLDC GLUCOMTR-MCNC: 149 MG/DL — HIGH (ref 70–99)
GLUCOSE SERPL-MCNC: 99 MG/DL — SIGNIFICANT CHANGE UP (ref 70–99)
HCT VFR BLD CALC: 24.3 % — LOW (ref 39–50)
HGB BLD-MCNC: 7.2 G/DL — LOW (ref 13–17)
HYPOCHROMIA BLD QL: SIGNIFICANT CHANGE UP
LYMPHOCYTES # BLD AUTO: 0.72 K/UL — LOW (ref 1–3.3)
LYMPHOCYTES # BLD AUTO: 5.3 % — LOW (ref 13–44)
MACROCYTES BLD QL: SLIGHT — SIGNIFICANT CHANGE UP
MAGNESIUM SERPL-MCNC: 2.3 MG/DL — SIGNIFICANT CHANGE UP (ref 1.6–2.6)
MANUAL SMEAR VERIFICATION: SIGNIFICANT CHANGE UP
MCHC RBC-ENTMCNC: 25.6 PG — LOW (ref 27–34)
MCHC RBC-ENTMCNC: 29.6 GM/DL — LOW (ref 32–36)
MCV RBC AUTO: 86.5 FL — SIGNIFICANT CHANGE UP (ref 80–100)
METAMYELOCYTES # FLD: 2.6 % — HIGH (ref 0–0)
MICROCYTES BLD QL: SLIGHT — SIGNIFICANT CHANGE UP
MONOCYTES # BLD AUTO: 1.55 K/UL — HIGH (ref 0–0.9)
MONOCYTES NFR BLD AUTO: 11.4 % — SIGNIFICANT CHANGE UP (ref 2–14)
MYELOCYTES NFR BLD: 1.8 % — HIGH (ref 0–0)
NEUTROPHILS # BLD AUTO: 9.79 K/UL — HIGH (ref 1.8–7.4)
NEUTROPHILS NFR BLD AUTO: 71.9 % — SIGNIFICANT CHANGE UP (ref 43–77)
PHOSPHATE SERPL-MCNC: 2.1 MG/DL — LOW (ref 2.5–4.5)
PLAT MORPH BLD: NORMAL — SIGNIFICANT CHANGE UP
PLATELET # BLD AUTO: 262 K/UL — SIGNIFICANT CHANGE UP (ref 150–400)
POIKILOCYTOSIS BLD QL AUTO: SLIGHT — SIGNIFICANT CHANGE UP
POLYCHROMASIA BLD QL SMEAR: SIGNIFICANT CHANGE UP
POTASSIUM SERPL-MCNC: 4 MMOL/L — SIGNIFICANT CHANGE UP (ref 3.5–5.3)
POTASSIUM SERPL-SCNC: 4 MMOL/L — SIGNIFICANT CHANGE UP (ref 3.5–5.3)
RBC # BLD: 2.81 M/UL — LOW (ref 4.2–5.8)
RBC # FLD: 22.1 % — HIGH (ref 10.3–14.5)
RBC BLD AUTO: ABNORMAL
RH IG SCN BLD-IMP: POSITIVE — SIGNIFICANT CHANGE UP
SARS-COV-2 RNA SPEC QL NAA+PROBE: SIGNIFICANT CHANGE UP
SMUDGE CELLS # BLD: PRESENT — SIGNIFICANT CHANGE UP
SODIUM SERPL-SCNC: 136 MMOL/L — SIGNIFICANT CHANGE UP (ref 135–145)
TARGETS BLD QL SMEAR: SLIGHT — SIGNIFICANT CHANGE UP
WBC # BLD: 13.61 K/UL — HIGH (ref 3.8–10.5)
WBC # FLD AUTO: 13.61 K/UL — HIGH (ref 3.8–10.5)

## 2021-10-18 PROCEDURE — 93926 LOWER EXTREMITY STUDY: CPT | Mod: 26,RT

## 2021-10-18 PROCEDURE — 33207 INSERT HEART PM VENTRICULAR: CPT

## 2021-10-18 PROCEDURE — 99024 POSTOP FOLLOW-UP VISIT: CPT

## 2021-10-18 PROCEDURE — 71045 X-RAY EXAM CHEST 1 VIEW: CPT | Mod: 26,76

## 2021-10-18 PROCEDURE — 33233 REMOVAL OF PM GENERATOR: CPT

## 2021-10-18 PROCEDURE — 33235 REMOVAL PACEMAKER ELECTRODE: CPT

## 2021-10-18 RX ORDER — FUROSEMIDE 40 MG
40 TABLET ORAL DAILY
Refills: 0 | Status: DISCONTINUED | OUTPATIENT
Start: 2021-10-19 | End: 2021-10-19

## 2021-10-18 RX ORDER — SPIRONOLACTONE 25 MG/1
25 TABLET, FILM COATED ORAL DAILY
Refills: 0 | Status: DISCONTINUED | OUTPATIENT
Start: 2021-10-18 | End: 2021-10-19

## 2021-10-18 RX ORDER — FUROSEMIDE 40 MG
20 TABLET ORAL ONCE
Refills: 0 | Status: COMPLETED | OUTPATIENT
Start: 2021-10-18 | End: 2021-10-18

## 2021-10-18 RX ADMIN — Medication 12.5 MILLIGRAM(S): at 18:44

## 2021-10-18 RX ADMIN — SODIUM CHLORIDE 3 MILLILITER(S): 9 INJECTION INTRAMUSCULAR; INTRAVENOUS; SUBCUTANEOUS at 22:08

## 2021-10-18 RX ADMIN — POLYETHYLENE GLYCOL 3350 17 GRAM(S): 17 POWDER, FOR SOLUTION ORAL at 12:16

## 2021-10-18 RX ADMIN — PANTOPRAZOLE SODIUM 40 MILLIGRAM(S): 20 TABLET, DELAYED RELEASE ORAL at 05:54

## 2021-10-18 RX ADMIN — SPIRONOLACTONE 25 MILLIGRAM(S): 25 TABLET, FILM COATED ORAL at 13:40

## 2021-10-18 RX ADMIN — CHLORHEXIDINE GLUCONATE 1 APPLICATION(S): 213 SOLUTION TOPICAL at 16:41

## 2021-10-18 RX ADMIN — Medication 975 MILLIGRAM(S): at 12:45

## 2021-10-18 RX ADMIN — Medication 12.5 MILLIGRAM(S): at 06:54

## 2021-10-18 RX ADMIN — OXYCODONE HYDROCHLORIDE 10 MILLIGRAM(S): 5 TABLET ORAL at 10:58

## 2021-10-18 RX ADMIN — Medication 25 MICROGRAM(S): at 05:54

## 2021-10-18 RX ADMIN — Medication 975 MILLIGRAM(S): at 05:54

## 2021-10-18 RX ADMIN — ATORVASTATIN CALCIUM 40 MILLIGRAM(S): 80 TABLET, FILM COATED ORAL at 22:42

## 2021-10-18 RX ADMIN — OXYCODONE HYDROCHLORIDE 10 MILLIGRAM(S): 5 TABLET ORAL at 11:28

## 2021-10-18 RX ADMIN — SODIUM CHLORIDE 3 MILLILITER(S): 9 INJECTION INTRAMUSCULAR; INTRAVENOUS; SUBCUTANEOUS at 05:36

## 2021-10-18 RX ADMIN — Medication 975 MILLIGRAM(S): at 22:41

## 2021-10-18 RX ADMIN — SENNA PLUS 2 TABLET(S): 8.6 TABLET ORAL at 22:42

## 2021-10-18 RX ADMIN — HEPARIN SODIUM 7 UNIT(S)/HR: 5000 INJECTION INTRAVENOUS; SUBCUTANEOUS at 07:00

## 2021-10-18 RX ADMIN — SODIUM CHLORIDE 3 MILLILITER(S): 9 INJECTION INTRAMUSCULAR; INTRAVENOUS; SUBCUTANEOUS at 13:07

## 2021-10-18 RX ADMIN — Medication 975 MILLIGRAM(S): at 06:54

## 2021-10-18 RX ADMIN — Medication 81 MILLIGRAM(S): at 12:16

## 2021-10-18 RX ADMIN — Medication 20 MILLIGRAM(S): at 13:40

## 2021-10-18 RX ADMIN — Medication 975 MILLIGRAM(S): at 12:15

## 2021-10-18 RX ADMIN — Medication 975 MILLIGRAM(S): at 04:52

## 2021-10-18 RX ADMIN — Medication 20 MILLIGRAM(S): at 05:54

## 2021-10-18 NOTE — PROVIDER CONTACT NOTE (OTHER) - ACTION/TREATMENT ORDERED:
stat K+ lab drawn and sent, will supplement depending on result   started pt on low dose bb BID  continue to monitor
NP came and evaluated the patient. Recommended Tylenol 975mg po and lidocaine patch. Will continue to monitor
Telemetry monitoring continued.

## 2021-10-18 NOTE — PROVIDER CONTACT NOTE (OTHER) - SITUATION
8 WCT on tele up to 120s 8 WCT on tele up to 120s  afib on the monitor 70s-80s  pt not currently on any ordered betablockers.

## 2021-10-18 NOTE — PROGRESS NOTE ADULT - SUBJECTIVE AND OBJECTIVE BOX
24H hour events: No acute overnight events. Patient complaining of mid-sternal incisional pain.    MEDICATIONS:  aspirin enteric coated 81 milliGRAM(s) Oral daily  heparin  Infusion 800 Unit(s)/Hr IV Continuous <Continuous>  metoprolol tartrate 12.5 milliGRAM(s) Oral two times a day  spironolactone 25 milliGRAM(s) Oral daily  acetaminophen   Tablet .. 650 milliGRAM(s) Oral every 6 hours PRN  acetaminophen   Tablet .. 975 milliGRAM(s) Oral every 8 hours  pantoprazole    Tablet 40 milliGRAM(s) Oral before breakfast  polyethylene glycol 3350 17 Gram(s) Oral daily  senna 2 Tablet(s) Oral at bedtime  simethicone 80 milliGRAM(s) Chew every 6 hours PRN  atorvastatin 40 milliGRAM(s) Oral at bedtime  dextrose 50% Injectable 50 milliLiter(s) IV Push every 15 minutes  insulin lispro (ADMELOG) corrective regimen sliding scale   SubCutaneous three times a day before meals  insulin lispro (ADMELOG) corrective regimen sliding scale   SubCutaneous at bedtime  levothyroxine 25 MICROGram(s) Oral daily  chlorhexidine 2% Cloths 1 Application(s) Topical daily  sodium chloride 0.9% lock flush 3 milliLiter(s) IV Push every 8 hours  sodium chloride 0.9%. 1000 milliLiter(s) IV Continuous <Continuous>      REVIEW OF SYSTEMS:  See HPI, otherwise ROS negative.    PHYSICAL EXAM:  T(C): 36.8 (10-18-21 @ 13:30), Max: 36.8 (10-18-21 @ 13:30)  HR: 68 (10-18-21 @ 13:30) (68 - 82)  BP: 111/64 (10-18-21 @ 13:30) (100/53 - 114/56)  RR: 18 (10-18-21 @ 13:30) (18 - 28)  SpO2: 96% (10-18-21 @ 13:30) (90% - 98%)  Wt(kg): --  I&O's Summary    17 Oct 2021 07:01  -  18 Oct 2021 07:00  --------------------------------------------------------  IN: 287 mL / OUT: 665 mL / NET: -378 mL    18 Oct 2021 07:01  -  18 Oct 2021 14:12  --------------------------------------------------------  IN: 162 mL / OUT: 350 mL / NET: -188 mL        Appearance: Alert. NAD	  Cardiovascular: +S1S2 RRR no m/g/r  Respiratory: Decreased breath sounds from mid to lower RL  Extremities: No edema BLE  Vascular: Peripheral pulses palpable 2+ bilaterally      LABS:	 	    CBC Full  -  ( 18 Oct 2021 04:23 )  WBC Count : 13.61 K/uL  Hemoglobin : 7.2 g/dL  Hematocrit : 24.3 %  Platelet Count - Automated : 262 K/uL  Mean Cell Volume : 86.5 fl  Mean Cell Hemoglobin : 25.6 pg  Mean Cell Hemoglobin Concentration : 29.6 gm/dL  Auto Neutrophil # : 9.79 K/uL  Auto Lymphocyte # : 0.72 K/uL  Auto Monocyte # : 1.55 K/uL  Auto Eosinophil # : 0.95 K/uL  Auto Basophil # : 0.00 K/uL  Auto Neutrophil % : 71.9 %  Auto Lymphocyte % : 5.3 %  Auto Monocyte % : 11.4 %  Auto Eosinophil % : 7.0 %  Auto Basophil % : 0.0 %    10-18    136  |  99  |  27<H>  ----------------------------<  99  4.0   |  24  |  0.96  10-17    x   |  x   |  x   ----------------------------<  x   3.9   |  x   |  x     Ca    8.3<L>      18 Oct 2021 04:23  Ca    8.1<L>      17 Oct 2021 00:30  Phos  2.1     10-18  Phos  2.1     10-17  Mg     2.3     10-18  Mg     2.2     10-17    TPro  5.4<L>  /  Alb  3.2<L>  /  TBili  1.2  /  DBili  x   /  AST  28  /  ALT  19  /  AlkPhos  128<H>  10-17    TELEMETRY: AF 60-70 BPM with intermittent ventricular pacing

## 2021-10-18 NOTE — PROGRESS NOTE ADULT - ASSESSMENT
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 Jose Patterson), CAD s/p CABG in 2009, Paroxysmal AF on Coumadin (non-compliant with A/C and Metoprolol), who admitted to [facility] with acute heart failure exacerbation/sob/LAU.  Patient states he has to stop and catch his breath when walking up stairs. On TTE found to have low normal EF 50-55%, mildly reduced RV function, mod-severe MR and severe TR. He was transferred to [facility] for open heart surgery with Dr. Hua. As per patient, he has not followed up regarding his pacemaker. Attempt made to interrogate it on this admission revealing it is EOL, no response with  or magnet.       1. Severe MR/TR, severely dilated RV s/p MVR/TVR, LAAL, RA & RV endocardial lead extraction, LV epicardial wire placement  2. ?tachy/arely syndrome s/p MDT dual chamber pacemaker - EOL; 2008 pulse generator intact  3. AF  4. h/o mechanical AVR 2008  5. CAD s/p CABG 2009  6. HTN    - Continue to monitor on tele  - Keep K >4 and Mg >2  - Continue Heparin for AC  - NPO after MN for lead extraction and LV lead hook up to generator  - Verbal consent by sister, Samantha Wright, and placed in chart  - Chlorhexidine to chest at 2200 and 0600  - COVID PCR negative 10/18  - Type and Screen x2 STAT      Lynda Gamble PA-C  #[phone] 70M PMH HTN, Unicuspid AV/Aortic stenosis s/p Mechanical AVR on Coumadin (6/2008), tachy-areyl syndrome s/p MDT Adapta PPM placement 7/7/2008 (Dr. Jose Patterson), CAD s/p CABG in 2009, Paroxysmal AF on Coumadin (non-compliant with A/C and Metoprolol), who admitted to [facility] with acute heart failure exacerbation/sob/LAU.  Patient states he has to stop and catch his breath when walking up stairs. On TTE found to have low normal EF 50-55%, mildly reduced RV function, mod-severe MR and severe TR. He was transferred to [facility] for open heart surgery with Dr. Hua. As per patient, he has not followed up regarding his pacemaker. Attempt made to interrogate it on this admission revealing it is EOL, no response with  or magnet.       1. Severe MR/TR, severely dilated RV s/p MVR/TVR, LAAL, RA & RV endocardial lead extraction, LV epicardial wire placement  2. ?tachy/arely syndrome s/p MDT dual chamber pacemaker - EOL; 2008 pulse generator intact  3. AF  4. h/o mechanical AVR 2008  5. CAD s/p CABG 2009  6. HTN    - Continue to monitor on tele  - Keep K >4 and Mg >2  - Continue Heparin for AC  - NPO after MN for lead extraction and LV lead hook up to generator  - Verbal consent by sister, Samantha Wright, and placed in chart  - Chlorhexidine to chest at 2200 and 0600  - COVID PCR negative 10/18  - Type and Screen x2 STAT  - Hold Heparin gtt at 0600 10/19.   - NO HEPARIN/LOVENOX PRODUCTS POST PROCEDURE. Plan for switch to DOAC post-procedure.    Lynda Gamble PA-C  #[phone]

## 2021-10-18 NOTE — PROVIDER CONTACT NOTE (OTHER) - BACKGROUND
Patient came in with shortness of breath
10/11 MVR/ TVR, LAAL, endocardial lead extraction, epicardial lead placement. 10/12 extubated R groin hematoma, CT scan completed, abdominal flank pain, + retroperitoneal bleed. 10/13 R chest hematoma
Admitted for Nonrheumatic mitral valve stenosis

## 2021-10-18 NOTE — PROVIDER CONTACT NOTE (OTHER) - ASSESSMENT
A&O4. Denies chest pain, palpitations or sob. VSS
asymptomatic, no CP or sob noted, denies lightheadedness  bp 103/60  o2sat 95%   HR 75   k+ 3.5  hep gtt running at 6.5cc/hr
Patient c/o pain on back of right upper shoulder, 8/10 vitals checked and documented. vitals stable

## 2021-10-18 NOTE — PROGRESS NOTE ADULT - SUBJECTIVE AND OBJECTIVE BOX
Subjective: "I have pain"  Examined/ interviewed in Mosotho by TATYANA Brown      Tele:  Afib 70s           V/S:                    T(F): 97.9 (10-18-21 @ 05:00), Max: 98.8 (10-17-21 @ 12:00)  HR: 73 (10-18-21 @ 05:00) (72 - 82)  BP: 100/53 (10-18-21 @ 05:00) (100/53 - 125/59)  RR: 18 (10-18-21 @ 05:00) (18 - 34)  SpO2: 95% (10-18-21 @ 05:00) (76% - 98%)  Wt(kg): --      LV EF:      Labs:  10-18    136  |  99  |  27<H>  ----------------------------<  99  4.0   |  24  |  0.96    Ca    8.3<L>      18 Oct 2021 04:23  Phos  2.1     10-18  Mg     2.3     10-18    TPro  5.4<L>  /  Alb  3.2<L>  /  TBili  1.2  /  DBili  x   /  AST  28  /  ALT  19  /  AlkPhos  128<H>  10-17                               7.2    13.61 )-----------( 262      ( 18 Oct 2021 04:23 )             24.3        PT/INR - ( 17 Oct 2021 04:28 )   PT: 13.6 sec;   INR: 1.14 ratio         PTT - ( 18 Oct 2021 10:49 )  PTT:54.0 sec     CAPILLARY BLOOD GLUCOSE      POCT Blood Glucose.: 120 mg/dL (18 Oct 2021 11:38)  POCT Blood Glucose.: 107 mg/dL (18 Oct 2021 07:43)  POCT Blood Glucose.: 116 mg/dL (17 Oct 2021 21:32)  POCT Blood Glucose.: 115 mg/dL (17 Oct 2021 16:39)           CXR:    I&O's Detail    17 Oct 2021 07:01  -  18 Oct 2021 07:00  --------------------------------------------------------  IN:    Heparin: 147 mL    Oral Fluid: 60 mL    sodium chloride 0.9%: 80 mL  Total IN: 287 mL    OUT:    Chest Tube (mL): 110 mL    Indwelling Catheter - Urethral (mL): 200 mL    Voided (mL): 355 mL  Total OUT: 665 mL    Total NET: -378 mL      18 Oct 2021 07:01  -  18 Oct 2021 11:48  --------------------------------------------------------  IN:    Heparin: 35 mL  Total IN: 35 mL    OUT:    Voided (mL): 250 mL  Total OUT: 250 mL    Total NET: -215 mL          CHEST TUBE:  [x] YES [ ] NO  OUTPUT:100     per 24 hours    AIR LEAKS:  [ ] YES [ ] NO      CHAN DRAIN:   [x ] YES [ ] NO  OUTPUT:     per 24 hours    EPICARDIAL WIRES:  [ ] YES [ ] NO      BOWEL MOVEMENT:  [x ] YES [ ] NO  "small"    Daily     Daily   Medications:  acetaminophen   Tablet .. 650 milliGRAM(s) Oral every 6 hours PRN  acetaminophen   Tablet .. 975 milliGRAM(s) Oral every 8 hours  aspirin enteric coated 81 milliGRAM(s) Oral daily  atorvastatin 40 milliGRAM(s) Oral at bedtime  chlorhexidine 2% Cloths 1 Application(s) Topical daily  dextrose 50% Injectable 50 milliLiter(s) IV Push every 15 minutes  furosemide    Tablet 20 milliGRAM(s) Oral two times a day  heparin  Infusion 800 Unit(s)/Hr IV Continuous <Continuous>  insulin lispro (ADMELOG) corrective regimen sliding scale   SubCutaneous three times a day before meals  insulin lispro (ADMELOG) corrective regimen sliding scale   SubCutaneous at bedtime  levothyroxine 25 MICROGram(s) Oral daily  metoprolol tartrate 12.5 milliGRAM(s) Oral two times a day  pantoprazole    Tablet 40 milliGRAM(s) Oral before breakfast  polyethylene glycol 3350 17 Gram(s) Oral daily  senna 2 Tablet(s) Oral at bedtime  simethicone 80 milliGRAM(s) Chew every 6 hours PRN  sodium chloride 0.9% lock flush 3 milliLiter(s) IV Push every 8 hours  sodium chloride 0.9%. 1000 milliLiter(s) IV Continuous <Continuous>        Physical Exam:    Neurology: alert and oriented x 3    CV : irr irr    Sternal Wound :  CDI , Stable- DRESSING IN PLACE    Lungs: crackles bases    Abdomen: soft, nontender, nondistended, positive bowel sounds, last bowel movement       10/16    Extremities:    no edema , no calf tenderness- right groin suture w/ large ecchymotic area   (s/p cath) w/ ecchymotic scrotum.                     PAST MEDICAL & SURGICAL HISTORY:  HTN (hypertension)    Complete heart block    CAD (coronary artery disease)    Paroxysmal atrial fibrillation    History of mechanical aortic valve replacement    History of pacemaker    S/P CABG (coronary artery bypass graft)               Subjective: "I have pain"  Examined/ interviewed in [place]ames[place] by TATYANA Brown      Tele:  Afib 70s           V/S:                    T(F): 97.9 (10-18-21 @ 05:00), Max: 98.8 (10-17-21 @ 12:00)  HR: 73 (10-18-21 @ 05:00) (72 - 82)  BP: 100/53 (10-18-21 @ 05:00) (100/53 - 125/59)  RR: 18 (10-18-21 @ 05:00) (18 - 34)  SpO2: 95% (10-18-21 @ 05:00) (76% - 98%)  Wt(kg): --      LV EF:      Labs:  10-18    136  |  99  |  27<H>  ----------------------------<  99  4.0   |  24  |  0.96    Ca    8.3<L>      18 Oct 2021 04:23  Phos  2.1     10-18  Mg     2.3     10-18    TPro  5.4<L>  /  Alb  3.2<L>  /  TBili  1.2  /  DBili  x   /  AST  28  /  ALT  19  /  AlkPhos  128<H>  10-17                               7.2    13.61 )-----------( 262      ( 18 Oct 2021 04:23 )             24.3        PT/INR - ( 17 Oct 2021 04:28 )   PT: 13.6 sec;   INR: 1.14 ratio         PTT - ( 18 Oct 2021 10:49 )  PTT:54.0 sec     CAPILLARY BLOOD GLUCOSE      POCT Blood Glucose.: 120 mg/dL (18 Oct 2021 11:38)  POCT Blood Glucose.: 107 mg/dL (18 Oct 2021 07:43)  POCT Blood Glucose.: 116 mg/dL (17 Oct 2021 21:32)  POCT Blood Glucose.: 115 mg/dL (17 Oct 2021 16:39)           CXR:    I&O's Detail    17 Oct 2021 07:01  -  18 Oct 2021 07:00  --------------------------------------------------------  IN:    Heparin: 147 mL    Oral Fluid: 60 mL    sodium chloride 0.9%: 80 mL  Total IN: 287 mL    OUT:    Chest Tube (mL): 110 mL    Indwelling Catheter - Urethral (mL): 200 mL    Voided (mL): 355 mL  Total OUT: 665 mL    Total NET: -378 mL      18 Oct 2021 07:01  -  18 Oct 2021 11:48  --------------------------------------------------------  IN:    Heparin: 35 mL  Total IN: 35 mL    OUT:    Voided (mL): 250 mL  Total OUT: 250 mL    Total NET: -215 mL          CHEST TUBE:  [x] YES [ ] NO  OUTPUT:100     per 24 hours    AIR LEAKS:  [ ] YES [ ] NO      CHAN DRAIN:   [x ] YES [ ] NO  OUTPUT:     per 24 hours    EPICARDIAL WIRES:  [ x] YES [ ] NO      BOWEL MOVEMENT:  [x ] YES [ ] NO  "small"    Daily     Daily   Medications:  acetaminophen   Tablet .. 650 milliGRAM(s) Oral every 6 hours PRN  acetaminophen   Tablet .. 975 milliGRAM(s) Oral every 8 hours  aspirin enteric coated 81 milliGRAM(s) Oral daily  atorvastatin 40 milliGRAM(s) Oral at bedtime  chlorhexidine 2% Cloths 1 Application(s) Topical daily  dextrose 50% Injectable 50 milliLiter(s) IV Push every 15 minutes  furosemide    Tablet 20 milliGRAM(s) Oral two times a day  heparin  Infusion 800 Unit(s)/Hr IV Continuous <Continuous>  insulin lispro (ADMELOG) corrective regimen sliding scale   SubCutaneous three times a day before meals  insulin lispro (ADMELOG) corrective regimen sliding scale   SubCutaneous at bedtime  levothyroxine 25 MICROGram(s) Oral daily  metoprolol tartrate 12.5 milliGRAM(s) Oral two times a day  pantoprazole    Tablet 40 milliGRAM(s) Oral before breakfast  polyethylene glycol 3350 17 Gram(s) Oral daily  senna 2 Tablet(s) Oral at bedtime  simethicone 80 milliGRAM(s) Chew every 6 hours PRN  sodium chloride 0.9% lock flush 3 milliLiter(s) IV Push every 8 hours  sodium chloride 0.9%. 1000 milliLiter(s) IV Continuous <Continuous>        Physical Exam:    Neurology: alert and oriented x 3    CV : irr irr    Sternal Wound :  CDI , Stable- DRESSING IN PLACE  + Pacing wires    Lungs: crackles bases    Abdomen: soft, nontender, nondistended, positive bowel sounds, last bowel movement       10/16    Extremities:    no edema , no calf tenderness- right groin suture w/ large ecchymotic area   (s/p cath) w/ ecchymotic scrotum.                     PAST MEDICAL & SURGICAL HISTORY:  HTN (hypertension)    Complete heart block    CAD (coronary artery disease)    Paroxysmal atrial fibrillation    History of mechanical aortic valve replacement    History of pacemaker    S/P CABG (coronary artery bypass graft)

## 2021-10-18 NOTE — PROGRESS NOTE ADULT - PROBLEM SELECTOR PLAN 2
CHB s/p [employer]onic ADAPTA DC PPM   EP consulted for Pacemaker battery change on Tuesday, October 19  hold coumadin   continue heparin gtt CHB s/p Medtronic ADAPTA DC PPM   EP following  Generator change, lead replacement am  hold coumadin   continue heparin gtt

## 2021-10-18 NOTE — PROGRESS NOTE ADULT - PROBLEM SELECTOR PLAN 4
HX of Mechanical AVR from 2008  coumadin at hold  Continue AC with Heparin gtt  aptt goal 70 -90 HX of Mechanical AVR from 2008  coumadin at hold  Continue AC with Heparin gtt> hold 6am  aptt goal 70 -90

## 2021-10-18 NOTE — PROGRESS NOTE ADULT - ASSESSMENT
Patient is a 70M PMH HTN, CAD s/p CABG 10 years ago per patient , Complete Heart Block s/p PPM placement, Paroxysmal Afib on coumadin, Aortic stenosis s/p Mechanical AVR who admitted to OSH with acute heart failure exacerbation found on echo to have low normal EF 50-55%, severely dilated RA/RV/pulm HTN reduced RV fx, severe MR and severe TR. Transferred to [facility] for open heart surgery with Dr. Hua.     On 10/11/21, pt underwent Redo sternotomy MVR-t/TVR-t/RENÉE LIGATION/EPICARDIAL LEAD PLACEMENT  post-op bleeding  10/13 RTOR for evacuation of tamponade/hematoma/washout  extubated to [facility] now on n/c  a/c with heparin gtt only (PT HAS H/O AVR-MECHANICAL) - AS Pt. will need a PPM generator change on Tuesday, October 19 - seen by Dr. Crenshaw preop.   statin/ppi/no bb yet - will most likely resume after PPM generator change - was on Toprol preop  10/18 DC mediastinal tube  Generator change Tuesday  Duplex R groin R/O PSA Patient is a 70M PMH HTN, CAD s/p CABG 10 years ago per patient , Complete Heart Block s/p PPM placement, Paroxysmal Afib on coumadin, Aortic stenosis s/p Mechanical AVR who admitted to OSH with acute heart failure exacerbation found on echo to have low normal EF 50-55%, severely dilated RA/RV/pulm HTN reduced RV fx, severe MR and severe TR. Transferred to [facility] for open heart surgery with Dr. Hua.     On 10/11/21, pt underwent Redo sternotomy MVR-t/TVR-t/RENÉE LIGATION/EPICARDIAL LEAD PLACEMENT  post-op bleeding  10/13 RTOR for evacuation of tamponade/hematoma/washout  extubated to [facility] now on n/c  a/c with heparin gtt only (PT HAS H/O AVR-MECHANICAL) - AS Pt. will need a PPM generator change on Tuesday, October 19 - seen by Dr. Crenshaw preop.   statin/ppi/no bb yet - will most likely resume after PPM generator change - was on Toprol preop  10/18 DC mediastinal tube  Generator change, LV lead placement /exchange Tuesday  > Hold hep gtt 6am  Duplex R groin R/O PSA

## 2021-10-18 NOTE — PROGRESS NOTE ADULT - PROBLEM SELECTOR PLAN 1
Heparin gtt mechanicl AVR  statin  med ct removed Heparin gtt mechanical AVR  statin  med ct removed  Hold hep gtt 6am Gen change, lead revision

## 2021-10-19 ENCOUNTER — TRANSCRIPTION ENCOUNTER (OUTPATIENT)
Age: 71
End: 2021-10-19

## 2021-10-19 PROBLEM — I10 ESSENTIAL (PRIMARY) HYPERTENSION: Chronic | Status: ACTIVE | Noted: 2021-10-05

## 2021-10-19 PROBLEM — I44.2 ATRIOVENTRICULAR BLOCK, COMPLETE: Chronic | Status: ACTIVE | Noted: 2021-10-05

## 2021-10-19 PROBLEM — I48.0 PAROXYSMAL ATRIAL FIBRILLATION: Chronic | Status: ACTIVE | Noted: 2021-10-05

## 2021-10-19 PROBLEM — I25.10 ATHEROSCLEROTIC HEART DISEASE OF NATIVE CORONARY ARTERY WITHOUT ANGINA PECTORIS: Chronic | Status: ACTIVE | Noted: 2021-10-05

## 2021-10-19 LAB
ANION GAP SERPL CALC-SCNC: 13 MMOL/L — SIGNIFICANT CHANGE UP (ref 5–17)
APTT BLD: 42 SEC — HIGH (ref 27.5–35.5)
BASOPHILS # BLD AUTO: 0.01 K/UL — SIGNIFICANT CHANGE UP (ref 0–0.2)
BASOPHILS NFR BLD AUTO: 0.1 % — SIGNIFICANT CHANGE UP (ref 0–2)
BUN SERPL-MCNC: 28 MG/DL — HIGH (ref 7–23)
CALCIUM SERPL-MCNC: 8 MG/DL — LOW (ref 8.4–10.5)
CHLORIDE SERPL-SCNC: 97 MMOL/L — SIGNIFICANT CHANGE UP (ref 96–108)
CO2 SERPL-SCNC: 23 MMOL/L — SIGNIFICANT CHANGE UP (ref 22–31)
CREAT SERPL-MCNC: 0.95 MG/DL — SIGNIFICANT CHANGE UP (ref 0.5–1.3)
EOSINOPHIL # BLD AUTO: 0.43 K/UL — SIGNIFICANT CHANGE UP (ref 0–0.5)
EOSINOPHIL NFR BLD AUTO: 3.6 % — SIGNIFICANT CHANGE UP (ref 0–6)
GLUCOSE BLDC GLUCOMTR-MCNC: 118 MG/DL — HIGH (ref 70–99)
GLUCOSE BLDC GLUCOMTR-MCNC: 146 MG/DL — HIGH (ref 70–99)
GLUCOSE BLDC GLUCOMTR-MCNC: 98 MG/DL — SIGNIFICANT CHANGE UP (ref 70–99)
GLUCOSE SERPL-MCNC: 107 MG/DL — HIGH (ref 70–99)
HCT VFR BLD CALC: 22.8 % — LOW (ref 39–50)
HCT VFR BLD CALC: 27.9 % — LOW (ref 39–50)
HGB BLD-MCNC: 6.9 G/DL — CRITICAL LOW (ref 13–17)
HGB BLD-MCNC: 8.5 G/DL — LOW (ref 13–17)
IMM GRANULOCYTES NFR BLD AUTO: 2 % — HIGH (ref 0–1.5)
INR BLD: 1.02 RATIO — SIGNIFICANT CHANGE UP (ref 0.88–1.16)
LYMPHOCYTES # BLD AUTO: 0.72 K/UL — LOW (ref 1–3.3)
LYMPHOCYTES # BLD AUTO: 6 % — LOW (ref 13–44)
MAGNESIUM SERPL-MCNC: 2.2 MG/DL — SIGNIFICANT CHANGE UP (ref 1.6–2.6)
MCHC RBC-ENTMCNC: 25.9 PG — LOW (ref 27–34)
MCHC RBC-ENTMCNC: 26.4 PG — LOW (ref 27–34)
MCHC RBC-ENTMCNC: 30.3 GM/DL — LOW (ref 32–36)
MCHC RBC-ENTMCNC: 30.5 GM/DL — LOW (ref 32–36)
MCV RBC AUTO: 85.7 FL — SIGNIFICANT CHANGE UP (ref 80–100)
MCV RBC AUTO: 86.6 FL — SIGNIFICANT CHANGE UP (ref 80–100)
MONOCYTES # BLD AUTO: 1.75 K/UL — HIGH (ref 0–0.9)
MONOCYTES NFR BLD AUTO: 14.6 % — HIGH (ref 2–14)
NEUTROPHILS # BLD AUTO: 8.82 K/UL — HIGH (ref 1.8–7.4)
NEUTROPHILS NFR BLD AUTO: 73.7 % — SIGNIFICANT CHANGE UP (ref 43–77)
NRBC # BLD: 0 /100 WBCS — SIGNIFICANT CHANGE UP (ref 0–0)
NRBC # BLD: 0 /100 WBCS — SIGNIFICANT CHANGE UP (ref 0–0)
PHOSPHATE SERPL-MCNC: 2.2 MG/DL — LOW (ref 2.5–4.5)
PLATELET # BLD AUTO: 298 K/UL — SIGNIFICANT CHANGE UP (ref 150–400)
PLATELET # BLD AUTO: 322 K/UL — SIGNIFICANT CHANGE UP (ref 150–400)
POTASSIUM SERPL-MCNC: 3.7 MMOL/L — SIGNIFICANT CHANGE UP (ref 3.5–5.3)
POTASSIUM SERPL-SCNC: 3.7 MMOL/L — SIGNIFICANT CHANGE UP (ref 3.5–5.3)
PROTHROM AB SERPL-ACNC: 12.2 SEC — SIGNIFICANT CHANGE UP (ref 10.6–13.6)
RBC # BLD: 2.66 M/UL — LOW (ref 4.2–5.8)
RBC # BLD: 3.22 M/UL — LOW (ref 4.2–5.8)
RBC # FLD: 21.6 % — HIGH (ref 10.3–14.5)
RBC # FLD: 21.8 % — HIGH (ref 10.3–14.5)
SODIUM SERPL-SCNC: 133 MMOL/L — LOW (ref 135–145)
WBC # BLD: 10.34 K/UL — SIGNIFICANT CHANGE UP (ref 3.8–10.5)
WBC # BLD: 11.97 K/UL — HIGH (ref 3.8–10.5)
WBC # FLD AUTO: 10.34 K/UL — SIGNIFICANT CHANGE UP (ref 3.8–10.5)
WBC # FLD AUTO: 11.97 K/UL — HIGH (ref 3.8–10.5)

## 2021-10-19 PROCEDURE — 99233 SBSQ HOSP IP/OBS HIGH 50: CPT

## 2021-10-19 PROCEDURE — 71045 X-RAY EXAM CHEST 1 VIEW: CPT | Mod: 26

## 2021-10-19 RX ORDER — WARFARIN SODIUM 2.5 MG/1
5 TABLET ORAL ONCE
Refills: 0 | Status: COMPLETED | OUTPATIENT
Start: 2021-10-19 | End: 2021-10-19

## 2021-10-19 RX ORDER — SENNA PLUS 8.6 MG/1
2 TABLET ORAL AT BEDTIME
Refills: 0 | Status: DISCONTINUED | OUTPATIENT
Start: 2021-10-19 | End: 2021-10-25

## 2021-10-19 RX ORDER — POTASSIUM CHLORIDE 20 MEQ
40 PACKET (EA) ORAL ONCE
Refills: 0 | Status: COMPLETED | OUTPATIENT
Start: 2021-10-19 | End: 2021-10-19

## 2021-10-19 RX ORDER — ACETAMINOPHEN 500 MG
650 TABLET ORAL EVERY 6 HOURS
Refills: 0 | Status: DISCONTINUED | OUTPATIENT
Start: 2021-10-19 | End: 2021-10-25

## 2021-10-19 RX ORDER — LEVOTHYROXINE SODIUM 125 MCG
25 TABLET ORAL DAILY
Refills: 0 | Status: DISCONTINUED | OUTPATIENT
Start: 2021-10-19 | End: 2021-10-25

## 2021-10-19 RX ORDER — ATORVASTATIN CALCIUM 80 MG/1
40 TABLET, FILM COATED ORAL AT BEDTIME
Refills: 0 | Status: DISCONTINUED | OUTPATIENT
Start: 2021-10-19 | End: 2021-10-25

## 2021-10-19 RX ORDER — INSULIN LISPRO 100/ML
VIAL (ML) SUBCUTANEOUS
Refills: 0 | Status: DISCONTINUED | OUTPATIENT
Start: 2021-10-19 | End: 2021-10-20

## 2021-10-19 RX ORDER — POLYETHYLENE GLYCOL 3350 17 G/17G
17 POWDER, FOR SOLUTION ORAL DAILY
Refills: 0 | Status: DISCONTINUED | OUTPATIENT
Start: 2021-10-19 | End: 2021-10-25

## 2021-10-19 RX ORDER — SODIUM CHLORIDE 9 MG/ML
3 INJECTION INTRAMUSCULAR; INTRAVENOUS; SUBCUTANEOUS EVERY 8 HOURS
Refills: 0 | Status: DISCONTINUED | OUTPATIENT
Start: 2021-10-19 | End: 2021-10-25

## 2021-10-19 RX ORDER — OXYCODONE AND ACETAMINOPHEN 5; 325 MG/1; MG/1
1 TABLET ORAL EVERY 4 HOURS
Refills: 0 | Status: DISCONTINUED | OUTPATIENT
Start: 2021-10-19 | End: 2021-10-25

## 2021-10-19 RX ORDER — OXYCODONE HYDROCHLORIDE 5 MG/1
5 TABLET ORAL ONCE
Refills: 0 | Status: DISCONTINUED | OUTPATIENT
Start: 2021-10-19 | End: 2021-10-19

## 2021-10-19 RX ORDER — FUROSEMIDE 40 MG
40 TABLET ORAL DAILY
Refills: 0 | Status: DISCONTINUED | OUTPATIENT
Start: 2021-10-19 | End: 2021-10-25

## 2021-10-19 RX ORDER — OXYCODONE AND ACETAMINOPHEN 5; 325 MG/1; MG/1
2 TABLET ORAL EVERY 6 HOURS
Refills: 0 | Status: DISCONTINUED | OUTPATIENT
Start: 2021-10-19 | End: 2021-10-25

## 2021-10-19 RX ORDER — ACETAMINOPHEN 500 MG
650 TABLET ORAL EVERY 6 HOURS
Refills: 0 | Status: DISCONTINUED | OUTPATIENT
Start: 2021-10-19 | End: 2021-10-19

## 2021-10-19 RX ORDER — SPIRONOLACTONE 25 MG/1
25 TABLET, FILM COATED ORAL DAILY
Refills: 0 | Status: DISCONTINUED | OUTPATIENT
Start: 2021-10-19 | End: 2021-10-25

## 2021-10-19 RX ORDER — HALOPERIDOL DECANOATE 100 MG/ML
2 INJECTION INTRAMUSCULAR ONCE
Refills: 0 | Status: DISCONTINUED | OUTPATIENT
Start: 2021-10-19 | End: 2021-10-19

## 2021-10-19 RX ORDER — ACETAMINOPHEN 500 MG
1000 TABLET ORAL ONCE
Refills: 0 | Status: COMPLETED | OUTPATIENT
Start: 2021-10-19 | End: 2021-10-19

## 2021-10-19 RX ORDER — METOPROLOL TARTRATE 50 MG
12.5 TABLET ORAL
Refills: 0 | Status: DISCONTINUED | OUTPATIENT
Start: 2021-10-19 | End: 2021-10-25

## 2021-10-19 RX ORDER — PANTOPRAZOLE SODIUM 20 MG/1
40 TABLET, DELAYED RELEASE ORAL
Refills: 0 | Status: DISCONTINUED | OUTPATIENT
Start: 2021-10-19 | End: 2021-10-25

## 2021-10-19 RX ORDER — SIMETHICONE 80 MG/1
80 TABLET, CHEWABLE ORAL EVERY 6 HOURS
Refills: 0 | Status: DISCONTINUED | OUTPATIENT
Start: 2021-10-19 | End: 2021-10-25

## 2021-10-19 RX ORDER — ASPIRIN/CALCIUM CARB/MAGNESIUM 324 MG
81 TABLET ORAL DAILY
Refills: 0 | Status: DISCONTINUED | OUTPATIENT
Start: 2021-10-19 | End: 2021-10-25

## 2021-10-19 RX ORDER — INSULIN LISPRO 100/ML
VIAL (ML) SUBCUTANEOUS AT BEDTIME
Refills: 0 | Status: DISCONTINUED | OUTPATIENT
Start: 2021-10-19 | End: 2021-10-20

## 2021-10-19 RX ORDER — HYDROMORPHONE HYDROCHLORIDE 2 MG/ML
0.5 INJECTION INTRAMUSCULAR; INTRAVENOUS; SUBCUTANEOUS
Refills: 0 | Status: DISCONTINUED | OUTPATIENT
Start: 2021-10-19 | End: 2021-10-19

## 2021-10-19 RX ORDER — ONDANSETRON 8 MG/1
4 TABLET, FILM COATED ORAL ONCE
Refills: 0 | Status: DISCONTINUED | OUTPATIENT
Start: 2021-10-19 | End: 2021-10-19

## 2021-10-19 RX ORDER — CHLORHEXIDINE GLUCONATE 213 G/1000ML
1 SOLUTION TOPICAL ONCE
Refills: 0 | Status: COMPLETED | OUTPATIENT
Start: 2021-10-19 | End: 2021-10-19

## 2021-10-19 RX ADMIN — SPIRONOLACTONE 25 MILLIGRAM(S): 25 TABLET, FILM COATED ORAL at 05:47

## 2021-10-19 RX ADMIN — Medication 40 MILLIEQUIVALENT(S): at 20:24

## 2021-10-19 RX ADMIN — Medication 400 MILLIGRAM(S): at 03:29

## 2021-10-19 RX ADMIN — Medication 1000 MILLIGRAM(S): at 04:28

## 2021-10-19 RX ADMIN — Medication 40 MILLIGRAM(S): at 05:47

## 2021-10-19 RX ADMIN — Medication 12.5 MILLIGRAM(S): at 17:38

## 2021-10-19 RX ADMIN — WARFARIN SODIUM 5 MILLIGRAM(S): 2.5 TABLET ORAL at 22:26

## 2021-10-19 RX ADMIN — Medication 1000 MILLIGRAM(S): at 10:00

## 2021-10-19 RX ADMIN — Medication 12.5 MILLIGRAM(S): at 07:01

## 2021-10-19 RX ADMIN — Medication 400 MILLIGRAM(S): at 09:43

## 2021-10-19 RX ADMIN — Medication 975 MILLIGRAM(S): at 04:27

## 2021-10-19 RX ADMIN — Medication 25 MICROGRAM(S): at 05:45

## 2021-10-19 RX ADMIN — PANTOPRAZOLE SODIUM 40 MILLIGRAM(S): 20 TABLET, DELAYED RELEASE ORAL at 05:47

## 2021-10-19 RX ADMIN — ATORVASTATIN CALCIUM 40 MILLIGRAM(S): 80 TABLET, FILM COATED ORAL at 22:26

## 2021-10-19 RX ADMIN — OXYCODONE AND ACETAMINOPHEN 1 TABLET(S): 5; 325 TABLET ORAL at 17:38

## 2021-10-19 RX ADMIN — CHLORHEXIDINE GLUCONATE 1 APPLICATION(S): 213 SOLUTION TOPICAL at 11:01

## 2021-10-19 RX ADMIN — SODIUM CHLORIDE 3 MILLILITER(S): 9 INJECTION INTRAMUSCULAR; INTRAVENOUS; SUBCUTANEOUS at 05:44

## 2021-10-19 RX ADMIN — SODIUM CHLORIDE 3 MILLILITER(S): 9 INJECTION INTRAMUSCULAR; INTRAVENOUS; SUBCUTANEOUS at 21:58

## 2021-10-19 NOTE — PRE-ANESTHESIA EVALUATION ADULT - NSANTHOSAYNRD_GEN_A_CORE
No. AMINATA screening performed.  STOP BANG Legend: 0-2 = LOW Risk; 3-4 = INTERMEDIATE Risk; 5-8 = HIGH Risk

## 2021-10-19 NOTE — PRE-ANESTHESIA EVALUATION ADULT - NSANTHPMHFT_GEN_ALL_CORE
72yo man with hx of very poor medical compliance, s/p AVR/ascending aortic replacement 2008 for unicuspid AV, c/b tachybrady syndrome req ppm. Afib non compliant with coumadin, non compliant with beta blocker, admitted with heart failure symptoms found to have severe biatrial enlargement in setting of severe TR, MR. Very poor dentition, denies esophageal pathology.
On 10/11/21, pt underwent Redo sternotomy MVR-t/TVR-t/RENÉE LIGATION/EPICARDIAL LEAD PLACEMENT  post-op bleeding  10/13 RTOR for evacuation of tamponade/hematoma/washout  extubated to [facility] now on n/c

## 2021-10-19 NOTE — PROGRESS NOTE ADULT - PROBLEM SELECTOR PLAN 4
HX of Mechanical AVR from 2008  coumadin at hold  Continue AC with Heparin gtt> hold 6am  aptt goal 70 -90 HX of Mechanical AVR from 2008  Continue AC on DOAC  Heparin drip on hold HX of Mechanical AVR from 2008  Continue AC on Coumadin  Heparin drip on hold

## 2021-10-19 NOTE — PROGRESS NOTE ADULT - SUBJECTIVE AND OBJECTIVE BOX
24H hour events: No acute overnight events    MEDICATIONS:  aspirin enteric coated 81 milliGRAM(s) Oral daily  furosemide    Tablet 40 milliGRAM(s) Oral daily  heparin  Infusion 800 Unit(s)/Hr IV Continuous <Continuous>  metoprolol tartrate 12.5 milliGRAM(s) Oral two times a day  spironolactone 25 milliGRAM(s) Oral daily  acetaminophen   Tablet .. 650 milliGRAM(s) Oral every 6 hours PRN  pantoprazole    Tablet 40 milliGRAM(s) Oral before breakfast  polyethylene glycol 3350 17 Gram(s) Oral daily  senna 2 Tablet(s) Oral at bedtime  simethicone 80 milliGRAM(s) Chew every 6 hours PRN  atorvastatin 40 milliGRAM(s) Oral at bedtime  dextrose 50% Injectable 50 milliLiter(s) IV Push every 15 minutes  insulin lispro (ADMELOG) corrective regimen sliding scale   SubCutaneous three times a day before meals  insulin lispro (ADMELOG) corrective regimen sliding scale   SubCutaneous at bedtime  levothyroxine 25 MICROGram(s) Oral daily  chlorhexidine 2% Cloths 1 Application(s) Topical daily  chlorhexidine 2% Cloths 1 Application(s) Topical once  sodium chloride 0.9% lock flush 3 milliLiter(s) IV Push every 8 hours  sodium chloride 0.9%. 1000 milliLiter(s) IV Continuous <Continuous>      REVIEW OF SYSTEMS:  See HPI, otherwise ROS negative.    PHYSICAL EXAM:  T(C): 36.4 (10-19-21 @ 04:10), Max: 36.9 (10-18-21 @ 20:27)  HR: 63 (10-19-21 @ 04:10) (63 - 73)  BP: 105/67 (10-19-21 @ 04:10) (94/52 - 111/64)  RR: 18 (10-19-21 @ 04:10) (18 - 18)  SpO2: 95% (10-19-21 @ 04:10) (95% - 96%)  Wt(kg): --  I&O's Summary    18 Oct 2021 07:01  -  19 Oct 2021 07:00  --------------------------------------------------------  IN: 487 mL / OUT: 1050 mL / NET: -563 mL        Appearance: Alert. NAD	  Cardiovascular: Irregularly irregular  Respiratory: Chest clear to auscultation  Extremities: No edema BLE  Vascular: Peripheral pulses palpable 2+ bilaterally      LABS:	 	    CBC Full  -  ( 19 Oct 2021 04:46 )  WBC Count : 11.97 K/uL  Hemoglobin : 6.9 g/dL  Hematocrit : 22.8 %  Platelet Count - Automated : 298 K/uL  Mean Cell Volume : 85.7 fl  Mean Cell Hemoglobin : 25.9 pg  Mean Cell Hemoglobin Concentration : 30.3 gm/dL  Auto Neutrophil # : 8.82 K/uL  Auto Lymphocyte # : 0.72 K/uL  Auto Monocyte # : 1.75 K/uL  Auto Eosinophil # : 0.43 K/uL  Auto Basophil # : 0.01 K/uL  Auto Neutrophil % : 73.7 %  Auto Lymphocyte % : 6.0 %  Auto Monocyte % : 14.6 %  Auto Eosinophil % : 3.6 %  Auto Basophil % : 0.1 %    10-19    133<L>  |  97  |  28<H>  ----------------------------<  107<H>  3.7   |  23  |  0.95  10-18    136  |  99  |  27<H>  ----------------------------<  99  4.0   |  24  |  0.96    Ca    8.0<L>      19 Oct 2021 04:46  Ca    8.3<L>      18 Oct 2021 04:23  Phos  2.2     10-19  Phos  2.1     10-18  Mg     2.2     10-19  Mg     2.3     10-18    TELEMETRY: AF 60-80 BPM

## 2021-10-19 NOTE — DISCHARGE NOTE NURSING/CASE MANAGEMENT/SOCIAL WORK - PATIENT PORTAL LINK FT
You can access the FollowMyHealth Patient Portal offered by [facility] by registering at the following website: [email or web address]. By joining [employer]’s FollowMyHealth portal, you will also be able to view your health information using other applications (apps) compatible with our system.

## 2021-10-19 NOTE — PROVIDER CONTACT NOTE (CRITICAL VALUE NOTIFICATION) - BACKGROUND
10/11 mvr/tvr, RENÉE ligation and lead extraction, epicardial lead placement  10/12 hematoma in R groin CT showed + retroperitoneal bleed, hematuria   10/13 R chest hematoma - RTOR for washout

## 2021-10-19 NOTE — PROGRESS NOTE ADULT - ASSESSMENT
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 Jose Patterson), CAD s/p CABG in 2009, Paroxysmal AF on Coumadin (non-compliant with A/C and Metoprolol), who admitted to [facility] with acute heart failure exacerbation/sob/LAU.  Patient states he has to stop and catch his breath when walking up stairs. On TTE found to have low normal EF 50-55%, mildly reduced RV function, mod-severe MR and severe TR. He was transferred to [facility] for open heart surgery with Dr. Hua. As per patient, he has not followed up regarding his pacemaker. Attempt made to interrogate it on this admission revealing it is EOL, no response with  or magnet.       1. Severe MR/TR, severely dilated RV s/p MVR/TVR, LAAL, RA & RV endocardial lead extraction, LV epicardial wire placement  2. ?tachy/arely syndrome s/p MDT dual chamber pacemaker - EOL; 2008 pulse generator intact  3. AF  4. h/o mechanical AVR 2008  5. CAD s/p CABG 2009  6. HTN    - Continue to monitor on tele  - Keep K >4 and Mg >2  - Maintain NPO for lead extraction and LV lead hook up to generator  - Verbal consent by sister, Samantha Wright, and placed in chart  - COVID PCR negative 10/18  - Hold Heparin gtt at 0600 10/19.   - NO HEPARIN/LOVENOX PRODUCTS POST PROCEDURE. Plan for switch to DOAC post-procedure.  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 Jose Patterson), CAD s/p CABG in 2009, Paroxysmal AF on Coumadin (non-compliant with A/C and Metoprolol), who admitted to [facility] with acute heart failure exacerbation/sob/LAU.  Patient states he has to stop and catch his breath when walking up stairs. On TTE found to have low normal EF 50-55%, mildly reduced RV function, mod-severe MR and severe TR. He was transferred to [facility] for open heart surgery with Dr. Hua. As per patient, he has not followed up regarding his pacemaker. Attempt made to interrogate it on this admission revealing it is EOL, no response with  or magnet.       1. Severe MR/TR, severely dilated RV s/p MVR/TVR, LAAL, RA & RV endocardial lead extraction, LV epicardial wire placement  2. ?tachy/arely syndrome s/p MDT dual chamber pacemaker - EOL; 2008 pulse generator intact  3. AF  4. h/o mechanical AVR 2008  5. CAD s/p CABG 2009  6. HTN    - Continue to monitor on tele  - Keep K >4 and Mg >2  - Maintain NPO for lead extraction and LV lead hook up to generator  - Verbal consent by sister, Samantha Wright, and placed in chart  - COVID PCR negative 10/18  - Hold Heparin gtt at 0600 10/19.   - NO HEPARIN/LOVENOX PRODUCTS POST PROCEDURE.  - Hgb of 6.9 today, receiving 1U PRBC  - Plan to resume Coumadin in setting of mechanical AVR, if necessary to bridge, do not start Heparin until tomorrow    RIGOBERTO ColeC  #[id number]

## 2021-10-19 NOTE — PROGRESS NOTE ADULT - ATTENDING COMMENTS
seen and agree  plan for PCM lead removal at time of surgery and LV epicardial lead placement
seen and agree  plan for extraction and connection of LV lead
seen and agree  plan for extraction and connection of LV lead

## 2021-10-19 NOTE — PROGRESS NOTE ADULT - SUBJECTIVE AND OBJECTIVE BOX
Subjective: Pt states "Hello" denies any CP or SOB. No acute events overnight.     Telemetry:  Afib 77  Vital Signs Last 24 Hrs  T(C): 36.9 (10-19-21 @ 12:16), Max: 36.9 (10-18-21 @ 20:27)  T(F): 98.4 (10-19-21 @ 11:42), Max: 98.4 (10-18-21 @ 20:27)  HR: 71 (10-19-21 @ 12:16) (63 - 73)  BP: 147/65 (10-19-21 @ 12:16) (94/52 - 147/65)  RR: 18 (10-19-21 @ 12:16) (18 - 18)  SpO2: 96% (10-19-21 @ 12:16) (95% - 96%)             10-18 @ 07:01  -  10-19 @ 07:00  --------------------------------------------------------  IN: 487 mL / OUT: 1050 mL / NET: -563 mL                            6.9    11.97 )-----------( 298      ( 19 Oct 2021 04:46 )             22.8     133<L>  |  97  |  28<H>  ----------------------------<  107<H>  3.7   |  23  |  0.95          CAPILLARY BLOOD GLUCOSE  118 - 149      PHYSICAL EXAM  Neurology: A&Ox3, NAD  CV : RRR+S1S2  Sternal Wound: MSI CDI BARRON, Stable    +PW - EPM VVI 40/10  Lungs: Respirations non-labored, B/L BS clear, diminished at bases  Abdomen: Soft, NT/ND, +BSx4Q, last BM 10/18  (-)N/V/D  : Voiding without difficulty  Extremities: B/L LE no edema, negative calf tenderness, +PP             MEDICATIONS  acetaminophen   Tablet .. 650 milliGRAM(s) Oral every 6 hours PRN  acetaminophen   Tablet .. 975 milliGRAM(s) Oral every 8 hours  aspirin enteric coated 81 milliGRAM(s) Oral daily  atorvastatin 40 milliGRAM(s) Oral at bedtime  chlorhexidine 2% Cloths 1 Application(s) Topical daily  dextrose 50% Injectable 50 milliLiter(s) IV Push every 15 minutes  furosemide    Tablet 20 milliGRAM(s) Oral two times a day  heparin  Infusion 800 Unit(s)/Hr IV Continuous <Continuous>  insulin lispro (ADMELOG) corrective regimen sliding scale   SubCutaneous three times a day before meals  insulin lispro (ADMELOG) corrective regimen sliding scale   SubCutaneous at bedtime  levothyroxine 25 MICROGram(s) Oral daily  metoprolol tartrate 12.5 milliGRAM(s) Oral two times a day  pantoprazole    Tablet 40 milliGRAM(s) Oral before breakfast  polyethylene glycol 3350 17 Gram(s) Oral daily  senna 2 Tablet(s) Oral at bedtime  simethicone 80 milliGRAM(s) Chew every 6 hours PRN  sodium chloride 0.9% lock flush 3 milliLiter(s) IV Push every 8 hours  sodium chloride 0.9%. 1000 milliLiter(s) IV Continuous <Continuous>    Physical Therapy Rec:   Home  [ X ]   Home w/ PT  [  ]  Rehab  [  ]    Discussed with Cardiothoracic Team at AM rounds.

## 2021-10-19 NOTE — PROGRESS NOTE ADULT - PROBLEM SELECTOR PLAN 2
CHB s/p Medtronic ADAPTA DC PPM   EP following  Generator change, lead replacement am  hold coumadin   continue heparin gtt CHB s/p Medtronic ADAPTA DC PPM   EP following  s/p lead extraction and LV lead hook up to generator 10/19  - NO HEPARIN/LOVENOX PRODUCTS POST PROCEDURE. Plan for switch to DOAC post-procedure. CHB s/p Medtronic ADAPTA DC PPM   EP following  s/p lead extraction and LV lead hook up to generator 10/19  - NO HEPARIN/LOVENOX PRODUCTS POST PROCEDURE. Can resume AC post-procedure.

## 2021-10-19 NOTE — PROVIDER CONTACT NOTE (CRITICAL VALUE NOTIFICATION) - ASSESSMENT
no pallor, dizziness or lightheadedness at this time  VS as per flow sheet  heparin gtt held at 6am as per orders  pt NPO for PPM

## 2021-10-19 NOTE — PROGRESS NOTE ADULT - PROBLEM SELECTOR PLAN 1
Heparin gtt mechanical AVR  statin  med ct removed  Hold hep gtt 6am Gen change, lead revision Continue asa 81 daily, metoprolol 12.5 BID and atorvastatin 40 HS  Titrate up beta-blocker as tolerated  Continue lasix 40 po daily and aldactone 25 daily  Strict I & Os, daily weight  Cough and deep breathe, Incentive Spirometry Q1h, Chest PT.  Ambulate 4x daily as tolerated and with PT.  C/W GI prophylaxis on protonix  Disposition: Home when medically cleared, social work following

## 2021-10-19 NOTE — DISCHARGE NOTE NURSING/CASE MANAGEMENT/SOCIAL WORK - NSDCPEEMAIL_GEN_ALL_CORE
[facility] for Tobacco Control email [email or web address]

## 2021-10-19 NOTE — PROGRESS NOTE ADULT - PROBLEM SELECTOR PLAN 3
asa  statin Continue asa 81 daily, metoprolol 12.5 BID and atorvastatin 40 HS  Titrate up beta-blocker as tolerated  Cough and deep breathe, Incentive Spirometry Q1h, Chest PT.  Ambulate 4x daily as tolerated and with PT.  C/W GI prophylaxis on protonix  Disposition: Home when medically cleared, social work following

## 2021-10-19 NOTE — DISCHARGE NOTE NURSING/CASE MANAGEMENT/SOCIAL WORK - NSDCFUADDAPPT_GEN_ALL_CORE_FT
Cardiothoracic Surgery:  Wednesday, October 27th at 9:45 with Dr. Hua   [address] [place] [address], [address] [place] [address]   Phone: [phone]	     Primary Care Follow Up:   Monday, November 1st at 1:45PM with Dr. Adolph Alexis at [facility]   [address] [place], [place] [address]  [phone]     Cardiology:   Monday, November 22nd at 2:45PM with the [facility]   [address] [place] [address]  [phone]    Cardiothoracic Surgery:  Wednesday, October 27th at 9:45 with Dr. Hua   [address] [place] [address] Drive, [address] [place] [address]   Phone: [phone]	     Primary Care Follow Up:   Friday, October 29th at 1:30PM with Dr. Adolph Alexis at [facility]   [address] [place] [address]  [phone]   Bring blood draw scripts and hospital paperwork with you.     Cardiology:   Monday, November 22nd at 2:45PM with the [facility]   [address] [place] [address]  [phone]

## 2021-10-19 NOTE — DISCHARGE NOTE NURSING/CASE MANAGEMENT/SOCIAL WORK - NSDCPEWEB_GEN_ALL_CORE
[facility] for Tobacco Control website --- [email or web address] --- [email or web address]

## 2021-10-19 NOTE — PROGRESS NOTE ADULT - ASSESSMENT
Patient is a 70M PMH HTN, CAD s/p CABG 10 years ago per patient , Complete Heart Block s/p PPM placement, Paroxysmal Afib on coumadin, Aortic stenosis s/p Mechanical AVR who admitted to OSH with acute heart failure exacerbation found on echo to have low normal EF 50-55%, severely dilated RA/RV/pulm HTN reduced RV fx, severe MR and severe TR. Transferred to [facility] for open heart surgery with Dr. Hua.     On 10/11/21, pt underwent Redo sternotomy MVR-t/TVR-t/RENÉE LIGATION/EPICARDIAL LEAD PLACEMENT  post-op bleeding  10/13 RTOR for evacuation of tamponade/hematoma/washout  extubated to [facility] now on n/c  a/c with heparin gtt only (PT HAS H/O AVR-MECHANICAL) - AS Pt. will need a PPM generator change on Tuesday, October 19 - seen by Dr. Crenshaw preop.   statin/ppi/no bb yet - will most likely resume after PPM generator change - was on Toprol preop  10/18 DC mediastinal tube  Generator change, LV lead placement /exchange Tuesday  > Hold hep gtt 6am  Duplex R groin R/O PSA Patient is a 70M PMH HTN, CAD s/p CABG 10 years ago per patient , Complete Heart Block s/p PPM placement, Paroxysmal Afib on coumadin, Aortic stenosis s/p Mechanical AVR who admitted to OSH with acute heart failure exacerbation found on echo to have low normal EF 50-55%, severely dilated RA/RV/pulm HTN reduced RV fx, severe MR and severe TR. Transferred to [facility] for open heart surgery with Dr. Hua.     On 10/11/21, pt underwent Redo sternotomy MVR-t/TVR-t/RENÉE LIGATION/EPICARDIAL LEAD PLACEMENT  post-op bleeding  10/13 RTOR for evacuation of tamponade/hematoma/washout  extubated to [facility] now on n/c  a/c with heparin gtt only (PT HAS H/O AVR-MECHANICAL) - AS Pt. will need a PPM generator change on Tuesday, October 19 - seen by Dr. Crenshaw preop.   statin/ppi/no bb yet - will most likely resume after PPM generator change - was on Toprol preop  10/18 DC mediastinal tube  Generator change, LV lead placement /exchange Tuesday  > Hold hep gtt 6am  Duplex R groin R/O PSA  10/19 VSS, Pt is NPO for PPM generator change and lead placement today with EP. H/H 6.9/22.8 -1U PRBC this AM.  Per EP, no heparin/lovenox post-procedure. Plan for switch to DOAC. Patient is a 70M PMH HTN, CAD s/p CABG 10 years ago per patient , Complete Heart Block s/p PPM placement, Paroxysmal Afib on coumadin, Aortic stenosis s/p Mechanical AVR who admitted to OSH with acute heart failure exacerbation found on echo to have low normal EF 50-55%, severely dilated RA/RV/pulm HTN reduced RV fx, severe MR and severe TR. Transferred to [facility] for open heart surgery with Dr. Hua.     On 10/11/21, pt underwent Redo sternotomy MVR-t/TVR-t/RENÉE LIGATION/EPICARDIAL LEAD PLACEMENT  post-op bleeding  10/13 RTOR for evacuation of tamponade/hematoma/washout  extubated to [facility] now on n/c  a/c with heparin gtt only (PT HAS H/O AVR-MECHANICAL) - AS Pt. will need a PPM generator change on Tuesday, October 19 - seen by Dr. Crenshaw preop.   statin/ppi/no bb yet - will most likely resume after PPM generator change - was on Toprol preop  10/18 DC mediastinal tube  Generator change, LV lead placement /exchange Tuesday  > Hold hep gtt 6am  Duplex R groin R/O PSA  10/19 VSS, Pt is NPO for PPM lead extraction and placement today with EP. H/H 6.9/22.8 -1U PRBC this AM.  Per EP, no heparin/lovenox post-procedure. Can resume Coumadin post-procedure.

## 2021-10-19 NOTE — DISCHARGE NOTE NURSING/CASE MANAGEMENT/SOCIAL WORK - NSDCPEPTCOWADIET_GEN_ALL_CORE
Keep your intake of vitamin K regular. The highest amount of vitamin K is found in green and leafy vegetables like broccoli, lettuces, cabbage, and spinach. You can eat these foods but keep the portion size the same. Changes in the amount you eat can affect your PT/INR blood test. Contact your doctor before making any major changes in your diet. Limit your alcohol intake. 3

## 2021-10-20 LAB
ANION GAP SERPL CALC-SCNC: 10 MMOL/L — SIGNIFICANT CHANGE UP (ref 5–17)
APPEARANCE UR: CLEAR — SIGNIFICANT CHANGE UP
APTT BLD: 29.8 SEC — SIGNIFICANT CHANGE UP (ref 27.5–35.5)
BACTERIA # UR AUTO: NEGATIVE — SIGNIFICANT CHANGE UP
BASOPHILS # BLD AUTO: 0.03 K/UL — SIGNIFICANT CHANGE UP (ref 0–0.2)
BASOPHILS NFR BLD AUTO: 0.3 % — SIGNIFICANT CHANGE UP (ref 0–2)
BILIRUB UR-MCNC: NEGATIVE — SIGNIFICANT CHANGE UP
BUN SERPL-MCNC: 24 MG/DL — HIGH (ref 7–23)
CALCIUM SERPL-MCNC: 8.1 MG/DL — LOW (ref 8.4–10.5)
CHLORIDE SERPL-SCNC: 99 MMOL/L — SIGNIFICANT CHANGE UP (ref 96–108)
CO2 SERPL-SCNC: 25 MMOL/L — SIGNIFICANT CHANGE UP (ref 22–31)
COLOR SPEC: YELLOW — SIGNIFICANT CHANGE UP
CREAT SERPL-MCNC: 0.88 MG/DL — SIGNIFICANT CHANGE UP (ref 0.5–1.3)
DIFF PNL FLD: NEGATIVE — SIGNIFICANT CHANGE UP
EOSINOPHIL # BLD AUTO: 0.35 K/UL — SIGNIFICANT CHANGE UP (ref 0–0.5)
EOSINOPHIL NFR BLD AUTO: 3 % — SIGNIFICANT CHANGE UP (ref 0–6)
EPI CELLS # UR: 3 /HPF — SIGNIFICANT CHANGE UP
GLUCOSE BLDC GLUCOMTR-MCNC: 114 MG/DL — HIGH (ref 70–99)
GLUCOSE SERPL-MCNC: 104 MG/DL — HIGH (ref 70–99)
GLUCOSE UR QL: NEGATIVE — SIGNIFICANT CHANGE UP
HCT VFR BLD CALC: 24.4 % — LOW (ref 39–50)
HGB BLD-MCNC: 7.7 G/DL — LOW (ref 13–17)
HYALINE CASTS # UR AUTO: 2 /LPF — SIGNIFICANT CHANGE UP (ref 0–2)
IMM GRANULOCYTES NFR BLD AUTO: 2 % — HIGH (ref 0–1.5)
INR BLD: 1.04 RATIO — SIGNIFICANT CHANGE UP (ref 0.88–1.16)
KETONES UR-MCNC: SIGNIFICANT CHANGE UP
LEUKOCYTE ESTERASE UR-ACNC: NEGATIVE — SIGNIFICANT CHANGE UP
LYMPHOCYTES # BLD AUTO: 0.68 K/UL — LOW (ref 1–3.3)
LYMPHOCYTES # BLD AUTO: 5.8 % — LOW (ref 13–44)
MAGNESIUM SERPL-MCNC: 2.2 MG/DL — SIGNIFICANT CHANGE UP (ref 1.6–2.6)
MCHC RBC-ENTMCNC: 26.5 PG — LOW (ref 27–34)
MCHC RBC-ENTMCNC: 31.6 GM/DL — LOW (ref 32–36)
MCV RBC AUTO: 83.8 FL — SIGNIFICANT CHANGE UP (ref 80–100)
MONOCYTES # BLD AUTO: 1.57 K/UL — HIGH (ref 0–0.9)
MONOCYTES NFR BLD AUTO: 13.5 % — SIGNIFICANT CHANGE UP (ref 2–14)
NEUTROPHILS # BLD AUTO: 8.8 K/UL — HIGH (ref 1.8–7.4)
NEUTROPHILS NFR BLD AUTO: 75.4 % — SIGNIFICANT CHANGE UP (ref 43–77)
NITRITE UR-MCNC: NEGATIVE — SIGNIFICANT CHANGE UP
NRBC # BLD: 0 /100 WBCS — SIGNIFICANT CHANGE UP (ref 0–0)
PH UR: 6 — SIGNIFICANT CHANGE UP (ref 5–8)
PHOSPHATE SERPL-MCNC: 2.4 MG/DL — LOW (ref 2.5–4.5)
PLATELET # BLD AUTO: 343 K/UL — SIGNIFICANT CHANGE UP (ref 150–400)
POTASSIUM SERPL-MCNC: 3.7 MMOL/L — SIGNIFICANT CHANGE UP (ref 3.5–5.3)
POTASSIUM SERPL-SCNC: 3.7 MMOL/L — SIGNIFICANT CHANGE UP (ref 3.5–5.3)
PROT UR-MCNC: ABNORMAL
PROTHROM AB SERPL-ACNC: 12.4 SEC — SIGNIFICANT CHANGE UP (ref 10.6–13.6)
RBC # BLD: 2.91 M/UL — LOW (ref 4.2–5.8)
RBC # FLD: 21.7 % — HIGH (ref 10.3–14.5)
RBC CASTS # UR COMP ASSIST: 4 /HPF — SIGNIFICANT CHANGE UP (ref 0–4)
SODIUM SERPL-SCNC: 134 MMOL/L — LOW (ref 135–145)
SP GR SPEC: 1.02 — SIGNIFICANT CHANGE UP (ref 1.01–1.02)
UROBILINOGEN FLD QL: ABNORMAL
WBC # BLD: 11.66 K/UL — HIGH (ref 3.8–10.5)
WBC # FLD AUTO: 11.66 K/UL — HIGH (ref 3.8–10.5)
WBC UR QL: 3 /HPF — SIGNIFICANT CHANGE UP (ref 0–5)

## 2021-10-20 PROCEDURE — 71046 X-RAY EXAM CHEST 2 VIEWS: CPT | Mod: 26

## 2021-10-20 PROCEDURE — 93010 ELECTROCARDIOGRAM REPORT: CPT

## 2021-10-20 RX ORDER — POTASSIUM CHLORIDE 20 MEQ
40 PACKET (EA) ORAL ONCE
Refills: 0 | Status: COMPLETED | OUTPATIENT
Start: 2021-10-20 | End: 2021-10-20

## 2021-10-20 RX ORDER — WARFARIN SODIUM 2.5 MG/1
7.5 TABLET ORAL ONCE
Refills: 0 | Status: COMPLETED | OUTPATIENT
Start: 2021-10-20 | End: 2021-10-20

## 2021-10-20 RX ADMIN — OXYCODONE AND ACETAMINOPHEN 2 TABLET(S): 5; 325 TABLET ORAL at 21:12

## 2021-10-20 RX ADMIN — OXYCODONE AND ACETAMINOPHEN 2 TABLET(S): 5; 325 TABLET ORAL at 21:42

## 2021-10-20 RX ADMIN — Medication 12.5 MILLIGRAM(S): at 06:13

## 2021-10-20 RX ADMIN — WARFARIN SODIUM 7.5 MILLIGRAM(S): 2.5 TABLET ORAL at 21:12

## 2021-10-20 RX ADMIN — SENNA PLUS 2 TABLET(S): 8.6 TABLET ORAL at 21:12

## 2021-10-20 RX ADMIN — SODIUM CHLORIDE 3 MILLILITER(S): 9 INJECTION INTRAMUSCULAR; INTRAVENOUS; SUBCUTANEOUS at 22:03

## 2021-10-20 RX ADMIN — SPIRONOLACTONE 25 MILLIGRAM(S): 25 TABLET, FILM COATED ORAL at 06:13

## 2021-10-20 RX ADMIN — Medication 81 MILLIGRAM(S): at 13:08

## 2021-10-20 RX ADMIN — ATORVASTATIN CALCIUM 40 MILLIGRAM(S): 80 TABLET, FILM COATED ORAL at 21:12

## 2021-10-20 RX ADMIN — OXYCODONE AND ACETAMINOPHEN 1 TABLET(S): 5; 325 TABLET ORAL at 04:15

## 2021-10-20 RX ADMIN — Medication 25 MICROGRAM(S): at 06:13

## 2021-10-20 RX ADMIN — Medication 40 MILLIEQUIVALENT(S): at 13:08

## 2021-10-20 RX ADMIN — SODIUM CHLORIDE 3 MILLILITER(S): 9 INJECTION INTRAMUSCULAR; INTRAVENOUS; SUBCUTANEOUS at 12:57

## 2021-10-20 RX ADMIN — OXYCODONE AND ACETAMINOPHEN 1 TABLET(S): 5; 325 TABLET ORAL at 01:10

## 2021-10-20 RX ADMIN — Medication 12.5 MILLIGRAM(S): at 17:56

## 2021-10-20 RX ADMIN — OXYCODONE AND ACETAMINOPHEN 1 TABLET(S): 5; 325 TABLET ORAL at 03:40

## 2021-10-20 RX ADMIN — SODIUM CHLORIDE 3 MILLILITER(S): 9 INJECTION INTRAMUSCULAR; INTRAVENOUS; SUBCUTANEOUS at 05:53

## 2021-10-20 RX ADMIN — Medication 40 MILLIGRAM(S): at 06:13

## 2021-10-20 RX ADMIN — PANTOPRAZOLE SODIUM 40 MILLIGRAM(S): 20 TABLET, DELAYED RELEASE ORAL at 06:13

## 2021-10-20 RX ADMIN — OXYCODONE AND ACETAMINOPHEN 1 TABLET(S): 5; 325 TABLET ORAL at 00:13

## 2021-10-20 NOTE — PROGRESS NOTE ADULT - SUBJECTIVE AND OBJECTIVE BOX
24H hour events: No acute overnight events    MEDICATIONS:  aspirin enteric coated 81 milliGRAM(s) Oral daily  furosemide    Tablet 40 milliGRAM(s) Oral daily  metoprolol tartrate 12.5 milliGRAM(s) Oral two times a day  spironolactone 25 milliGRAM(s) Oral daily  warfarin 7.5 milliGRAM(s) Oral once  acetaminophen   Tablet .. 650 milliGRAM(s) Oral every 6 hours PRN  oxycodone    5 mG/acetaminophen 325 mG 1 Tablet(s) Oral every 4 hours PRN  oxycodone    5 mG/acetaminophen 325 mG 2 Tablet(s) Oral every 6 hours PRN  pantoprazole    Tablet 40 milliGRAM(s) Oral before breakfast  polyethylene glycol 3350 17 Gram(s) Oral daily  senna 2 Tablet(s) Oral at bedtime  simethicone 80 milliGRAM(s) Chew every 6 hours OK  atorvastatin 40 milliGRAM(s) Oral at bedtime  levothyroxine 25 MICROGram(s) Oral daily  potassium chloride    Tablet ER 40 milliEquivalent(s) Oral once  sodium chloride 0.9% lock flush 3 milliLiter(s) IV Push every 8 hours      REVIEW OF SYSTEMS:  See HPI, otherwise ROS negative.    PHYSICAL EXAM:  T(C): 36.7 (10-20-21 @ 05:15), Max: 36.9 (10-19-21 @ 11:42)  HR: 67 (10-20-21 @ 07:38) (59 - 73)  BP: 96/54 (10-20-21 @ 07:38) (84/52 - 147/65)  RR: 18 (10-20-21 @ 05:15) (16 - 19)  SpO2: 92% (10-20-21 @ 05:15) (92% - 100%)  Wt(kg): --  I&O's Summary    19 Oct 2021 07:01  -  20 Oct 2021 07:00  --------------------------------------------------------  IN: 730 mL / OUT: 350 mL / NET: 380 mL    20 Oct 2021 07:01  -  20 Oct 2021 11:08  --------------------------------------------------------  IN: 0 mL / OUT: 400 mL / NET: -400 mL        Appearance: Alert. NAD	  Cardiovascular: +S1S2 RRR no m/g/r  Respiratory: CTA B/L	  Skin:   Extremities: No edema BLE  Vascular: Peripheral pulses palpable 2+ bilaterally      LABS:	 	    CBC Full  -  ( 20 Oct 2021 06:16 )  WBC Count : 11.66 K/uL  Hemoglobin : 7.7 g/dL  Hematocrit : 24.4 %  Platelet Count - Automated : 343 K/uL  Mean Cell Volume : 83.8 fl  Mean Cell Hemoglobin : 26.5 pg  Mean Cell Hemoglobin Concentration : 31.6 gm/dL  Auto Neutrophil # : 8.80 K/uL  Auto Lymphocyte # : 0.68 K/uL  Auto Monocyte # : 1.57 K/uL  Auto Eosinophil # : 0.35 K/uL  Auto Basophil # : 0.03 K/uL  Auto Neutrophil % : 75.4 %  Auto Lymphocyte % : 5.8 %  Auto Monocyte % : 13.5 %  Auto Eosinophil % : 3.0 %  Auto Basophil % : 0.3 %    10-20    134<L>  |  99  |  24<H>  ----------------------------<  104<H>  3.7   |  25  |  0.88  10-19    133<L>  |  97  |  28<H>  ----------------------------<  107<H>  3.7   |  23  |  0.95    Ca    8.1<L>      20 Oct 2021 06:16  Ca    8.0<L>      19 Oct 2021 04:46  Phos  2.4     10-20  Phos  2.2     10-19  Mg     2.2     10-20  Mg     2.2     10-19    TELEMETRY: AF 60-70 with intermittent V pacing  	    ECG:  	   24H hour events: No acute overnight events    MEDICATIONS:  aspirin enteric coated 81 milliGRAM(s) Oral daily  furosemide    Tablet 40 milliGRAM(s) Oral daily  metoprolol tartrate 12.5 milliGRAM(s) Oral two times a day  spironolactone 25 milliGRAM(s) Oral daily  warfarin 7.5 milliGRAM(s) Oral once  acetaminophen   Tablet .. 650 milliGRAM(s) Oral every 6 hours PRN  oxycodone    5 mG/acetaminophen 325 mG 1 Tablet(s) Oral every 4 hours PRN  oxycodone    5 mG/acetaminophen 325 mG 2 Tablet(s) Oral every 6 hours PRN  pantoprazole    Tablet 40 milliGRAM(s) Oral before breakfast  polyethylene glycol 3350 17 Gram(s) Oral daily  senna 2 Tablet(s) Oral at bedtime  simethicone 80 milliGRAM(s) Chew every 6 hours MT  atorvastatin 40 milliGRAM(s) Oral at bedtime  levothyroxine 25 MICROGram(s) Oral daily  potassium chloride    Tablet ER 40 milliEquivalent(s) Oral once  sodium chloride 0.9% lock flush 3 milliLiter(s) IV Push every 8 hours      REVIEW OF SYSTEMS:  See HPI, otherwise ROS negative.    PHYSICAL EXAM:  T(C): 36.7 (10-20-21 @ 05:15), Max: 36.9 (10-19-21 @ 11:42)  HR: 67 (10-20-21 @ 07:38) (59 - 73)  BP: 96/54 (10-20-21 @ 07:38) (84/52 - 147/65)  RR: 18 (10-20-21 @ 05:15) (16 - 19)  SpO2: 92% (10-20-21 @ 05:15) (92% - 100%)  Wt(kg): --  I&O's Summary    19 Oct 2021 07:01  -  20 Oct 2021 07:00  --------------------------------------------------------  IN: 730 mL / OUT: 350 mL / NET: 380 mL    20 Oct 2021 07:01  -  20 Oct 2021 11:08  --------------------------------------------------------  IN: 0 mL / OUT: 400 mL / NET: -400 mL        Appearance: Alert. NAD	  Cardiovascular: +S1S2 RRR  Respiratory: Chest clear to auscultation  Skin: LCWS with steri-strips in place, no edema, erythema or ecchymosis  Extremities: No edema BLE  Vascular: Peripheral pulses palpable 2+ bilaterally      LABS:	 	    CBC Full  -  ( 20 Oct 2021 06:16 )  WBC Count : 11.66 K/uL  Hemoglobin : 7.7 g/dL  Hematocrit : 24.4 %  Platelet Count - Automated : 343 K/uL  Mean Cell Volume : 83.8 fl  Mean Cell Hemoglobin : 26.5 pg  Mean Cell Hemoglobin Concentration : 31.6 gm/dL  Auto Neutrophil # : 8.80 K/uL  Auto Lymphocyte # : 0.68 K/uL  Auto Monocyte # : 1.57 K/uL  Auto Eosinophil # : 0.35 K/uL  Auto Basophil # : 0.03 K/uL  Auto Neutrophil % : 75.4 %  Auto Lymphocyte % : 5.8 %  Auto Monocyte % : 13.5 %  Auto Eosinophil % : 3.0 %  Auto Basophil % : 0.3 %    10-20    134<L>  |  99  |  24<H>  ----------------------------<  104<H>  3.7   |  25  |  0.88  10-19    133<L>  |  97  |  28<H>  ----------------------------<  107<H>  3.7   |  23  |  0.95    Ca    8.1<L>      20 Oct 2021 06:16  Ca    8.0<L>      19 Oct 2021 04:46  Phos  2.4     10-20  Phos  2.2     10-19  Mg     2.2     10-20  Mg     2.2     10-19    TELEMETRY: AF 60-70 with intermittent V pacing

## 2021-10-20 NOTE — PROGRESS NOTE ADULT - ASSESSMENT
Patient is a 70M PMH HTN, CAD s/p CABG 10 years ago per patient , Complete Heart Block s/p PPM placement, Paroxysmal Afib on coumadin, Aortic stenosis s/p Mechanical AVR who admitted to OSH with acute heart failure exacerbation found on echo to have low normal EF 50-55%, severely dilated RA/RV/pulm HTN reduced RV fx, severe MR and severe TR. Transferred to [facility] for open heart surgery with Dr. Hua.     On 10/11/21, pt underwent Redo sternotomy MVR-t/TVR-t/RENÉE LIGATION/EPICARDIAL LEAD PLACEMENT  post-op bleeding  10/13 RTOR for evacuation of tamponade/hematoma/washout  extubated to [facility] now on n/c  a/c with heparin gtt only (PT HAS H/O AVR-MECHANICAL) - AS Pt. will need a PPM generator change on Tuesday, October 19 - seen by Dr. Crenshaw preop.   statin/ppi/no bb yet - will most likely resume after PPM generator change - was on Toprol preop  10/18 DC mediastinal tube  Generator change, LV lead placement /exchange Tuesday  > Hold hep gtt 6am  Duplex R groin R/O PSA  10/19 VSS, s/p lead extraction and LV lead hook up to generator today with EP. H/H 6.9/22.8 -1U PRBC this AM.  Per EP, no heparin/lovenox post-procedure. Can resume Coumadin post-procedure.    10/20 VSS, PA-L CXR no PTX s/p PPM. PW dc'd. INR 1.0, Coumadin 7.5mg tonight. Sternal incision with serosanguinous drainage - Prevena dressing placed.

## 2021-10-20 NOTE — PROGRESS NOTE ADULT - PROBLEM SELECTOR PLAN 3
Continue asa 81 daily, metoprolol 12.5 BID and atorvastatin 40 HS  Titrate up beta-blocker as tolerated  Cough and deep breathe, Incentive Spirometry Q1h, Chest PT.  Ambulate 4x daily as tolerated and with PT.  C/W GI prophylaxis on protonix  Disposition: Home when medically cleared, social work following

## 2021-10-20 NOTE — PROGRESS NOTE ADULT - SUBJECTIVE AND OBJECTIVE BOX
Subjective: Pt states "Hello" denies any CP or SOB. No acute events overnight.     Telemetry:  Afib/paced 60 - 80  Vital Signs Last 24 Hrs  T(C): 36.7 (10-20-21 @ 05:15), Max: 36.9 (10-19-21 @ 11:42)  T(F): 98 (10-20-21 @ 05:15), Max: 98.4 (10-19-21 @ 11:42)  HR: 67 (10-20-21 @ 07:38) (59 - 73)  BP: 96/54 (10-20-21 @ 07:38) (84/52 - 147/65)  RR: 18 (10-20-21 @ 05:15) (16 - 19)  SpO2: 92% (10-20-21 @ 05:15) (92% - 100%)             10-19 @ 07:01  -  10-20 @ 07:00  --------------------------------------------------------  IN: 730 mL / OUT: 350 mL / NET: 380 mL                          7.7    11.66 )-----------( 343      ( 20 Oct 2021 06:16 )             24.4     134<L>  |  99  |  24<H>  ----------------------------<  104<H>  3.7   |  25  |  0.88            CAPILLARY BLOOD GLUCOSE  114 - 146        PHYSICAL EXAM  Neurology: A&Ox3, NAD  CV : RRR+S1S2              +Left anterior chest wall PPM incision CDI BARRON  Sternal Wound: MSI with serosanguinous drainage with PREVENA dressing, Stable   Lungs: Respirations non-labored, B/L BS clear, diminished at bases  Abdomen: Soft, NT/ND, +BSx4Q, last BM 10/18  (-)N/V/D  : Voiding without difficulty  Extremities: B/L LE no edema, negative calf tenderness, +PP                       +Right groin stitch CDI BARRON ecchymotic      MEDICATIONS  acetaminophen   Tablet .. 650 milliGRAM(s) Oral every 6 hours PRN  aspirin enteric coated 81 milliGRAM(s) Oral daily  atorvastatin 40 milliGRAM(s) Oral at bedtime  furosemide    Tablet 40 milliGRAM(s) Oral daily  levothyroxine 25 MICROGram(s) Oral daily  metoprolol tartrate 12.5 milliGRAM(s) Oral two times a day  oxycodone    5 mG/acetaminophen 325 mG 1 Tablet(s) Oral every 4 hours PRN  oxycodone    5 mG/acetaminophen 325 mG 2 Tablet(s) Oral every 6 hours PRN  pantoprazole    Tablet 40 milliGRAM(s) Oral before breakfast  polyethylene glycol 3350 17 Gram(s) Oral daily  potassium chloride    Tablet ER 40 milliEquivalent(s) Oral once  senna 2 Tablet(s) Oral at bedtime  simethicone 80 milliGRAM(s) Chew every 6 hours PRN  sodium chloride 0.9% lock flush 3 milliLiter(s) IV Push every 8 hours  spironolactone 25 milliGRAM(s) Oral daily  warfarin 7.5 milliGRAM(s) Oral once      Physical Therapy Rec:   Home  [ X ]   Home w/ PT  [  ]  Rehab  [  ]    Discussed with Cardiothoracic Team at AM rounds.

## 2021-10-20 NOTE — PROGRESS NOTE ADULT - PROBLEM SELECTOR PLAN 4
HX of Mechanical AVR from 2008  Continue AC on Coumadin, pt was on 5mg at home  Daily PT/INR  NO HEPARIN/LOVENOX PRODUCTS POST PROCEDURE per EP.

## 2021-10-20 NOTE — PROGRESS NOTE ADULT - PROBLEM SELECTOR PLAN 1
Continue asa 81 daily, metoprolol 12.5 BID and atorvastatin 40 HS  Titrate up beta-blocker as tolerated  Continue lasix 40 po daily and aldactone 25 daily  Strict I & Os, daily weight  Cough and deep breathe, Incentive Spirometry Q1h, Chest PT.  Ambulate 4x daily as tolerated and with PT.  C/W GI prophylaxis on protonix  Disposition: Home when medically cleared, social work following- f/u appointments made with primary care/cardiology clinics

## 2021-10-20 NOTE — PROGRESS NOTE ADULT - PROBLEM SELECTOR PLAN 2
CHB s/p Medtronic ADAPTA DC PPM   EP following  s/p lead extraction and LV lead hook up to generator 10/19  - NO HEPARIN/LOVENOX PRODUCTS POST PROCEDURE. Can resume AC post-procedure.

## 2021-10-20 NOTE — PROGRESS NOTE ADULT - ASSESSMENT
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 Jose Patterson), CAD s/p CABG in 2009, Paroxysmal AF on Coumadin (non-compliant with A/C and Metoprolol), who admitted to [facility] with acute heart failure exacerbation/sob/LAU.  Patient states he has to stop and catch his breath when walking up stairs. On TTE found to have low normal EF 50-55%, mildly reduced RV function, mod-severe MR and severe TR. He was transferred to [facility] for open heart surgery with Dr. Hua. As per patient, he has not followed up regarding his pacemaker. Attempt made to interrogate it on this admission revealing it is EOL, no response with  or magnet.       1. Severe MR/TR, severely dilated RV s/p MVR/TVR, LAAL, RA & RV endocardial lead extraction, LV epicardial wire placement  2. ?tachy/arely syndrome s/p MDT dual chamber pacemaker - EOL; 2008 pulse generator intact  3. AF  4. h/o mechanical AVR 2008  5. CAD s/p CABG 2009  6. 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 Jose Patterson), CAD s/p CABG in 2009, Paroxysmal AF on Coumadin (non-compliant with A/C and Metoprolol), who admitted to [facility] with acute heart failure exacerbation/sob/LAU.  Patient states he has to stop and catch his breath when walking up stairs. On TTE found to have low normal EF 50-55%, mildly reduced RV function, mod-severe MR and severe TR. He was transferred to [facility] for open heart surgery with Dr. Hua. As per patient, he has not followed up regarding his pacemaker. Attempt made to interrogate it on this admission revealing it is EOL, no response with  or magnet.       1. Severe MR/TR, severely dilated RV s/p MVR/TVR, LAAL, RA & RV endocardial lead extraction, LV epicardial wire placement  2. ?tachy/arely syndrome s/p MDT dual chamber pacemaker - EOL; 2008 pulse generator intact  3. AF  4. h/o mechanical AVR 2008  5. CAD s/p CABG 2009  6. 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 Jose Patterson), CAD s/p CABG in 2009, Paroxysmal AF on Coumadin (non-compliant with A/C and Metoprolol), who admitted to [facility] with acute heart failure exacerbation/sob/LAU.  Patient states he has to stop and catch his breath when walking up stairs. On TTE found to have low normal EF 50-55%, mildly reduced RV function, mod-severe MR and severe TR. He was transferred to [facility] for open heart surgery with Dr. Hua. As per patient, he has not followed up regarding his pacemaker. Attempt made to interrogate it on this admission revealing it is EOL, no response with  or magnet.       1. Severe MR/TR, severely dilated RV s/p MVR/TVR, LAAL, RA & RV endocardial lead extraction, LV epicardial wire placement  2. ?tachy/arely syndrome s/p MDT dual chamber pacemaker - EOL; 2008 pulse generator intact  3. AF  4. h/o mechanical AVR 2008  5. CAD s/p CABG 2009  6. HTN    - Keep K >4 and Mg >2  - S/p lead extraction and LV lead hook up to generator on 10/19  - Post procedure teaching provided to patient at bedside  - Device interrogated and paired by MDT rep. Temporary card given to patient  - Follow Up Appointment in EP clinic on 10/29 @ 1:40 PM  - EP will sign off at this time. Reconsult PRN.    Lynda Gamble PA-C  #[phone]

## 2021-10-21 LAB
ANION GAP SERPL CALC-SCNC: 12 MMOL/L — SIGNIFICANT CHANGE UP (ref 5–17)
APTT BLD: 32.2 SEC — SIGNIFICANT CHANGE UP (ref 27.5–35.5)
BUN SERPL-MCNC: 18 MG/DL — SIGNIFICANT CHANGE UP (ref 7–23)
CALCIUM SERPL-MCNC: 7.9 MG/DL — LOW (ref 8.4–10.5)
CHLORIDE SERPL-SCNC: 102 MMOL/L — SIGNIFICANT CHANGE UP (ref 96–108)
CO2 SERPL-SCNC: 21 MMOL/L — LOW (ref 22–31)
CREAT SERPL-MCNC: 0.7 MG/DL — SIGNIFICANT CHANGE UP (ref 0.5–1.3)
GLUCOSE SERPL-MCNC: 105 MG/DL — HIGH (ref 70–99)
HCT VFR BLD CALC: 26.6 % — LOW (ref 39–50)
HGB BLD-MCNC: 7.9 G/DL — LOW (ref 13–17)
INR BLD: 1.38 RATIO — HIGH (ref 0.88–1.16)
MCHC RBC-ENTMCNC: 25.5 PG — LOW (ref 27–34)
MCHC RBC-ENTMCNC: 29.7 GM/DL — LOW (ref 32–36)
MCV RBC AUTO: 85.8 FL — SIGNIFICANT CHANGE UP (ref 80–100)
NRBC # BLD: 0 /100 WBCS — SIGNIFICANT CHANGE UP (ref 0–0)
PLATELET # BLD AUTO: 346 K/UL — SIGNIFICANT CHANGE UP (ref 150–400)
POTASSIUM SERPL-MCNC: 4 MMOL/L — SIGNIFICANT CHANGE UP (ref 3.5–5.3)
POTASSIUM SERPL-SCNC: 4 MMOL/L — SIGNIFICANT CHANGE UP (ref 3.5–5.3)
PROTHROM AB SERPL-ACNC: 16.3 SEC — HIGH (ref 10.6–13.6)
RBC # BLD: 3.1 M/UL — LOW (ref 4.2–5.8)
RBC # FLD: 22 % — HIGH (ref 10.3–14.5)
SODIUM SERPL-SCNC: 135 MMOL/L — SIGNIFICANT CHANGE UP (ref 135–145)
WBC # BLD: 13.58 K/UL — HIGH (ref 3.8–10.5)
WBC # FLD AUTO: 13.58 K/UL — HIGH (ref 3.8–10.5)

## 2021-10-21 RX ORDER — WARFARIN SODIUM 2.5 MG/1
5 TABLET ORAL ONCE
Refills: 0 | Status: COMPLETED | OUTPATIENT
Start: 2021-10-21 | End: 2021-10-21

## 2021-10-21 RX ADMIN — SODIUM CHLORIDE 3 MILLILITER(S): 9 INJECTION INTRAMUSCULAR; INTRAVENOUS; SUBCUTANEOUS at 07:44

## 2021-10-21 RX ADMIN — SODIUM CHLORIDE 3 MILLILITER(S): 9 INJECTION INTRAMUSCULAR; INTRAVENOUS; SUBCUTANEOUS at 21:26

## 2021-10-21 RX ADMIN — ATORVASTATIN CALCIUM 40 MILLIGRAM(S): 80 TABLET, FILM COATED ORAL at 21:24

## 2021-10-21 RX ADMIN — Medication 12.5 MILLIGRAM(S): at 05:42

## 2021-10-21 RX ADMIN — Medication 650 MILLIGRAM(S): at 08:06

## 2021-10-21 RX ADMIN — Medication 12.5 MILLIGRAM(S): at 18:00

## 2021-10-21 RX ADMIN — WARFARIN SODIUM 5 MILLIGRAM(S): 2.5 TABLET ORAL at 21:24

## 2021-10-21 RX ADMIN — OXYCODONE AND ACETAMINOPHEN 2 TABLET(S): 5; 325 TABLET ORAL at 15:10

## 2021-10-21 RX ADMIN — OXYCODONE AND ACETAMINOPHEN 2 TABLET(S): 5; 325 TABLET ORAL at 14:12

## 2021-10-21 RX ADMIN — Medication 40 MILLIGRAM(S): at 05:42

## 2021-10-21 RX ADMIN — Medication 25 MICROGRAM(S): at 05:42

## 2021-10-21 RX ADMIN — Medication 650 MILLIGRAM(S): at 09:00

## 2021-10-21 RX ADMIN — Medication 81 MILLIGRAM(S): at 13:57

## 2021-10-21 RX ADMIN — SPIRONOLACTONE 25 MILLIGRAM(S): 25 TABLET, FILM COATED ORAL at 05:42

## 2021-10-21 RX ADMIN — SODIUM CHLORIDE 3 MILLILITER(S): 9 INJECTION INTRAMUSCULAR; INTRAVENOUS; SUBCUTANEOUS at 13:52

## 2021-10-21 RX ADMIN — PANTOPRAZOLE SODIUM 40 MILLIGRAM(S): 20 TABLET, DELAYED RELEASE ORAL at 05:42

## 2021-10-21 RX ADMIN — SENNA PLUS 2 TABLET(S): 8.6 TABLET ORAL at 21:24

## 2021-10-21 NOTE — PROGRESS NOTE ADULT - SUBJECTIVE AND OBJECTIVE BOX
Patient discussed on morning rounds with Dr. Hua    Operation / Date:     SUBJECTIVE ASSESSMENT:  71y Male         Vital Signs Last 24 Hrs  T(C): 36.6 (21 Oct 2021 05:00), Max: 36.8 (20 Oct 2021 17:56)  T(F): 97.9 (21 Oct 2021 05:00), Max: 98.2 (20 Oct 2021 17:56)  HR: 76 (21 Oct 2021 05:00) (69 - 77)  BP: 109/67 (21 Oct 2021 05:00) (101/61 - 109/67)  BP(mean): 81 (21 Oct 2021 05:00) (81 - 81)  RR: 18 (21 Oct 2021 05:00) (18 - 18)  SpO2: 92% (21 Oct 2021 05:00) (90% - 93%)  I&O's Detail    20 Oct 2021 07:01  -  21 Oct 2021 07:00  --------------------------------------------------------  IN:    Oral Fluid: 480 mL  Total IN: 480 mL    OUT:    VAC (Vacuum Assisted Closure) System (mL): 150 mL    Voided (mL): 1150 mL  Total OUT: 1300 mL    Total NET: -820 mL          CHEST TUBE:  Yes/No. AIR LEAKS: Yes/No. Suction / H2O SEAL.   CHAN DRAIN:  Yes/No.  EPICARDIAL WIRES: Yes/No.  TIE DOWNS: Yes/No.  HORNE: Yes/No.    PHYSICAL EXAM:    General:     Neurological:    Cardiovascular:    Respiratory:    Gastrointestinal:    Extremities:    Vascular:    Incision Sites:    LABS:                        7.9    13.58 )-----------( 346      ( 21 Oct 2021 06:47 )             26.6       COUMADIN:  Yes/No. REASON: .    PT/INR - ( 21 Oct 2021 06:47 )   PT: 16.3 sec;   INR: 1.38 ratio         PTT - ( 21 Oct 2021 06:47 )  PTT:32.2 sec    10-21    135  |  102  |  18  ----------------------------<  105<H>  4.0   |  21<L>  |  0.70    Ca    7.9<L>      21 Oct 2021 06:47  Phos  2.4     10-20  Mg     2.2     10-20        Urinalysis Basic - ( 20 Oct 2021 08:23 )    Color: Yellow / Appearance: Clear / S.024 / pH: x  Gluc: x / Ketone: Trace  / Bili: Negative / Urobili: 3 mg/dL   Blood: x / Protein: Trace / Nitrite: Negative   Leuk Esterase: Negative / RBC: 4 /hpf / WBC 3 /HPF   Sq Epi: x / Non Sq Epi: 3 /hpf / Bacteria: Negative        MEDICATIONS  (STANDING):  aspirin enteric coated 81 milliGRAM(s) Oral daily  atorvastatin 40 milliGRAM(s) Oral at bedtime  furosemide    Tablet 40 milliGRAM(s) Oral daily  levothyroxine 25 MICROGram(s) Oral daily  metoprolol tartrate 12.5 milliGRAM(s) Oral two times a day  pantoprazole    Tablet 40 milliGRAM(s) Oral before breakfast  polyethylene glycol 3350 17 Gram(s) Oral daily  senna 2 Tablet(s) Oral at bedtime  sodium chloride 0.9% lock flush 3 milliLiter(s) IV Push every 8 hours  spironolactone 25 milliGRAM(s) Oral daily    MEDICATIONS  (PRN):  acetaminophen   Tablet .. 650 milliGRAM(s) Oral every 6 hours PRN Mild Pain (1 - 3)  oxycodone    5 mG/acetaminophen 325 mG 1 Tablet(s) Oral every 4 hours PRN Moderate Pain (4 - 6)  oxycodone    5 mG/acetaminophen 325 mG 2 Tablet(s) Oral every 6 hours PRN Severe Pain (7 - 10)  simethicone 80 milliGRAM(s) Chew every 6 hours PRN Gas        RADIOLOGY & ADDITIONAL TESTS:

## 2021-10-22 LAB
ANION GAP SERPL CALC-SCNC: 13 MMOL/L — SIGNIFICANT CHANGE UP (ref 5–17)
BUN SERPL-MCNC: 17 MG/DL — SIGNIFICANT CHANGE UP (ref 7–23)
CALCIUM SERPL-MCNC: 8.2 MG/DL — LOW (ref 8.4–10.5)
CHLORIDE SERPL-SCNC: 101 MMOL/L — SIGNIFICANT CHANGE UP (ref 96–108)
CO2 SERPL-SCNC: 22 MMOL/L — SIGNIFICANT CHANGE UP (ref 22–31)
CREAT SERPL-MCNC: 0.73 MG/DL — SIGNIFICANT CHANGE UP (ref 0.5–1.3)
GLUCOSE SERPL-MCNC: 107 MG/DL — HIGH (ref 70–99)
HCT VFR BLD CALC: 26.9 % — LOW (ref 39–50)
HGB BLD-MCNC: 8.1 G/DL — LOW (ref 13–17)
INR BLD: 2.37 RATIO — HIGH (ref 0.88–1.16)
MCHC RBC-ENTMCNC: 25.9 PG — LOW (ref 27–34)
MCHC RBC-ENTMCNC: 30.1 GM/DL — LOW (ref 32–36)
MCV RBC AUTO: 85.9 FL — SIGNIFICANT CHANGE UP (ref 80–100)
NRBC # BLD: 0 /100 WBCS — SIGNIFICANT CHANGE UP (ref 0–0)
PLATELET # BLD AUTO: 346 K/UL — SIGNIFICANT CHANGE UP (ref 150–400)
POTASSIUM SERPL-MCNC: 4.1 MMOL/L — SIGNIFICANT CHANGE UP (ref 3.5–5.3)
POTASSIUM SERPL-SCNC: 4.1 MMOL/L — SIGNIFICANT CHANGE UP (ref 3.5–5.3)
PROTHROM AB SERPL-ACNC: 27.3 SEC — HIGH (ref 10.6–13.6)
RBC # BLD: 3.13 M/UL — LOW (ref 4.2–5.8)
RBC # FLD: 21.9 % — HIGH (ref 10.3–14.5)
SODIUM SERPL-SCNC: 136 MMOL/L — SIGNIFICANT CHANGE UP (ref 135–145)
WBC # BLD: 14.59 K/UL — HIGH (ref 3.8–10.5)
WBC # FLD AUTO: 14.59 K/UL — HIGH (ref 3.8–10.5)

## 2021-10-22 RX ORDER — WARFARIN SODIUM 2.5 MG/1
5 TABLET ORAL ONCE
Refills: 0 | Status: COMPLETED | OUTPATIENT
Start: 2021-10-22 | End: 2021-10-22

## 2021-10-22 RX ADMIN — Medication 40 MILLIGRAM(S): at 06:57

## 2021-10-22 RX ADMIN — SENNA PLUS 2 TABLET(S): 8.6 TABLET ORAL at 21:04

## 2021-10-22 RX ADMIN — POLYETHYLENE GLYCOL 3350 17 GRAM(S): 17 POWDER, FOR SOLUTION ORAL at 11:06

## 2021-10-22 RX ADMIN — ATORVASTATIN CALCIUM 40 MILLIGRAM(S): 80 TABLET, FILM COATED ORAL at 21:03

## 2021-10-22 RX ADMIN — SODIUM CHLORIDE 3 MILLILITER(S): 9 INJECTION INTRAMUSCULAR; INTRAVENOUS; SUBCUTANEOUS at 13:18

## 2021-10-22 RX ADMIN — SODIUM CHLORIDE 3 MILLILITER(S): 9 INJECTION INTRAMUSCULAR; INTRAVENOUS; SUBCUTANEOUS at 07:06

## 2021-10-22 RX ADMIN — WARFARIN SODIUM 5 MILLIGRAM(S): 2.5 TABLET ORAL at 21:03

## 2021-10-22 RX ADMIN — SODIUM CHLORIDE 3 MILLILITER(S): 9 INJECTION INTRAMUSCULAR; INTRAVENOUS; SUBCUTANEOUS at 21:24

## 2021-10-22 RX ADMIN — OXYCODONE AND ACETAMINOPHEN 2 TABLET(S): 5; 325 TABLET ORAL at 12:26

## 2021-10-22 RX ADMIN — OXYCODONE AND ACETAMINOPHEN 2 TABLET(S): 5; 325 TABLET ORAL at 21:18

## 2021-10-22 RX ADMIN — Medication 25 MICROGRAM(S): at 06:57

## 2021-10-22 RX ADMIN — Medication 12.5 MILLIGRAM(S): at 06:57

## 2021-10-22 RX ADMIN — Medication 650 MILLIGRAM(S): at 11:07

## 2021-10-22 RX ADMIN — Medication 81 MILLIGRAM(S): at 11:06

## 2021-10-22 RX ADMIN — Medication 650 MILLIGRAM(S): at 11:37

## 2021-10-22 RX ADMIN — OXYCODONE AND ACETAMINOPHEN 2 TABLET(S): 5; 325 TABLET ORAL at 20:48

## 2021-10-22 RX ADMIN — PANTOPRAZOLE SODIUM 40 MILLIGRAM(S): 20 TABLET, DELAYED RELEASE ORAL at 06:57

## 2021-10-22 RX ADMIN — Medication 12.5 MILLIGRAM(S): at 17:20

## 2021-10-22 RX ADMIN — SPIRONOLACTONE 25 MILLIGRAM(S): 25 TABLET, FILM COATED ORAL at 06:57

## 2021-10-22 RX ADMIN — OXYCODONE AND ACETAMINOPHEN 2 TABLET(S): 5; 325 TABLET ORAL at 12:56

## 2021-10-22 NOTE — PROGRESS NOTE ADULT - PROBLEM SELECTOR PLAN 1
Continue asa 81 daily, metoprolol 12.5 BID and atorvastatin 40 HS  Titrate up beta-blocker as tolerated  Continue lasix 40 po daily and aldactone 25 daily  Strict I & Os, daily weight  Cough and deep breathe, Incentive Spirometry Q1h, Chest PT.  Ambulate 4x daily as tolerated and with PT.  C/W GI prophylaxis on protonix  Disposition: Home when medically cleared, social work following- f/u appointments made with primary care/cardiology clinics Continue asa 81 daily, metoprolol 12.5 BID and atorvastatin 40 HS  Continue lasix 40 po daily and aldactone 25 daily  prevena d/c today - continue local wound care and observe incision   Strict I & Os, daily weight  Cough and deep breathe, Incentive Spirometry Q1h, Chest PT.  Ambulate 4x daily as tolerated and with PT.  C/W GI prophylaxis on protonix  Disposition: Home when medically cleared, social work following- f/u appointments made with primary care/cardiology clinics

## 2021-10-22 NOTE — PROGRESS NOTE ADULT - ASSESSMENT
Patient is a 70M PMH HTN, CAD s/p CABG 10 years ago per patient , Complete Heart Block s/p PPM placement, Paroxysmal Afib on coumadin, Aortic stenosis s/p Mechanical AVR who admitted to OSH with acute heart failure exacerbation found on echo to have low normal EF 50-55%, severely dilated RA/RV/pulm HTN reduced RV fx, severe MR and severe TR. Transferred to [facility] for open heart surgery with Dr. Hua.     On 10/11/21, pt underwent Redo sternotomy MVR-t/TVR-t/RENÉE LIGATION/EPICARDIAL LEAD PLACEMENT  post-op bleeding  10/13 RTOR for evacuation of tamponade/hematoma/washout  extubated to [facility] now on n/c  a/c with heparin gtt only (PT HAS H/O AVR-MECHANICAL) - AS Pt. will need a PPM generator change on Tuesday, October 19 - seen by Dr. Crenshaw preop.   statin/ppi/no bb yet - will most likely resume after PPM generator change - was on Toprol preop  10/18 DC mediastinal tube  Generator change, LV lead placement /exchange Tuesday  > Hold hep gtt 6am  Duplex R groin R/O PSA  10/19 VSS, s/p lead extraction and LV lead hook up to generator today with EP. H/H 6.9/22.8 -1U PRBC this AM.  Per EP, no heparin/lovenox post-procedure. Can resume Coumadin post-procedure.    10/20 VSS, PA-L CXR no PTX s/p PPM. PW dc'd. INR 1.0, Coumadin 7.5mg tonight. Sternal incision with serosanguinous drainage - Prevena dressing placed.  Patient is a 70M PMH HTN, CAD s/p CABG 10 years ago per patient , Complete Heart Block s/p PPM placement, Paroxysmal Afib on coumadin, Aortic stenosis s/p Mechanical AVR who admitted to OSH with acute heart failure exacerbation found on echo to have low normal EF 50-55%, severely dilated RA/RV/pulm HTN reduced RV fx, severe MR and severe TR. Transferred to [facility] for open heart surgery with Dr. Hua.     On 10/11/21, pt underwent Redo sternotomy MVR-t/TVR-t/RENÉE LIGATION/EPICARDIAL LEAD PLACEMENT  post-op bleeding  10/13 RTOR for evacuation of tamponade/hematoma/washout  extubated to [facility] now on n/c  a/c with heparin gtt only (PT HAS H/O AVR-MECHANICAL) - AS Pt. will need a PPM generator change on Tuesday, October 19 - seen by Dr. Crenshaw preop.   statin/ppi/no bb yet - will most likely resume after PPM generator change - was on Toprol preop  10/18 DC mediastinal tube  Generator change, LV lead placement /exchange Tuesday  > Hold hep gtt 6am  Duplex R groin R/O PSA  10/19 VSS, s/p lead extraction and LV lead hook up to generator today with EP. H/H 6.9/22.8 -1U PRBC this AM.  Per EP, no heparin/lovenox post-procedure. Can resume Coumadin post-procedure.    10/20 VSS, PA-L CXR no PTX s/p PPM. PW dc'd. INR 1.0, Coumadin 7.5mg tonight. Sternal incision with serosanguinous drainage - Prevena dressing placed.   10/21 VSS; right groin stitch d/c   10/22 VSS; prevena d/c- + midsternal incision drainage noted- local wound care and continue to monitor;  wbc 14 and pt afebrile; INR 2.3 - coumadin 5 mg this evening as per Dr. Hua  Discharge planning- home sat if midsternal incision stable

## 2021-10-22 NOTE — PROGRESS NOTE ADULT - SUBJECTIVE AND OBJECTIVE BOX
VITAL SIGNS    Telemetry:    Vital Signs Last 24 Hrs  T(C): 37 (10-22-21 @ 04:48), Max: 37 (10-21-21 @ 19:48)  T(F): 98.6 (10-22-21 @ 04:48), Max: 98.6 (10-21-21 @ 19:48)  HR: 76 (10-22-21 @ 04:48) (68 - 77)  BP: 109/66 (10-22-21 @ 04:48) (93/60 - 109/68)  RR: 18 (10-22-21 @ 04:48) (18 - 18)  SpO2: 98% (10-22-21 @ 04:48) (92% - 98%)            10-21 @ 07:01  -  10-22 @ 07:00  --------------------------------------------------------  IN: 360 mL / OUT: 500 mL / NET: -140 mL       Daily     Daily Weight in k.6 (22 Oct 2021 08:09)  Admit Wt: Drug Dosing Weight  Height (cm): 167.6 (19 Oct 2021 12:16)  Weight (kg): 63 (19 Oct 2021 12:16)  BMI (kg/m2): 22.4 (19 Oct 2021 12:16)  BSA (m2): 1.71 (19 Oct 2021 12:16)      CAPILLARY BLOOD GLUCOSE              acetaminophen   Tablet .. 650 milliGRAM(s) Oral every 6 hours PRN  aspirin enteric coated 81 milliGRAM(s) Oral daily  atorvastatin 40 milliGRAM(s) Oral at bedtime  furosemide    Tablet 40 milliGRAM(s) Oral daily  levothyroxine 25 MICROGram(s) Oral daily  metoprolol tartrate 12.5 milliGRAM(s) Oral two times a day  oxycodone    5 mG/acetaminophen 325 mG 1 Tablet(s) Oral every 4 hours PRN  oxycodone    5 mG/acetaminophen 325 mG 2 Tablet(s) Oral every 6 hours PRN  pantoprazole    Tablet 40 milliGRAM(s) Oral before breakfast  polyethylene glycol 3350 17 Gram(s) Oral daily  senna 2 Tablet(s) Oral at bedtime  simethicone 80 milliGRAM(s) Chew every 6 hours PRN  sodium chloride 0.9% lock flush 3 milliLiter(s) IV Push every 8 hours  spironolactone 25 milliGRAM(s) Oral daily      PHYSICAL EXAM    Subjective: "Hi.   Neurology: alert and oriented x 3, nonfocal, no gross deficits  CV : tele:  RSR  Sternal Wound :  CDI with dressing , Stable  Lungs: clear. RR easy, unlabored   Abdomen: soft, nontender, nondistended, positive bowel sounds, bowel movement   Neg N/V/D   :  pt voiding without difficulty   Extremities:   CORONADO; edema, neg calf tenderness.   PPP bilaterally      PW:  Chest tubes:                 VITAL SIGNS    Telemetry:  rsr 70-80   Vital Signs Last 24 Hrs  T(C): 37 (10-22-21 @ 04:48), Max: 37 (10-21-21 @ 19:48)  T(F): 98.6 (10-22-21 @ 04:48), Max: 98.6 (10-21-21 @ 19:48)  HR: 76 (10-22-21 @ 04:48) (68 - 77)  BP: 109/66 (10-22-21 @ 04:48) (93/60 - 109/68)  RR: 18 (10-22-21 @ 04:48) (18 - 18)  SpO2: 98% (10-22-21 @ 04:48) (92% - 98%)            10-21 @ 07:01  -  10-22 @ 07:00  --------------------------------------------------------  IN: 360 mL / OUT: 500 mL / NET: -140 mL       Daily     Daily Weight in k.6 (22 Oct 2021 08:09)  Admit Wt: Drug Dosing Weight  Height (cm): 167.6 (19 Oct 2021 12:16)  Weight (kg): 63 (19 Oct 2021 12:16)  BMI (kg/m2): 22.4 (19 Oct 2021 12:16)  BSA (m2): 1.71 (19 Oct 2021 12:16)        acetaminophen   Tablet .. 650 milliGRAM(s) Oral every 6 hours PRN  aspirin enteric coated 81 milliGRAM(s) Oral daily  atorvastatin 40 milliGRAM(s) Oral at bedtime  furosemide    Tablet 40 milliGRAM(s) Oral daily  levothyroxine 25 MICROGram(s) Oral daily  metoprolol tartrate 12.5 milliGRAM(s) Oral two times a day  oxycodone    5 mG/acetaminophen 325 mG 1 Tablet(s) Oral every 4 hours PRN  oxycodone    5 mG/acetaminophen 325 mG 2 Tablet(s) Oral every 6 hours PRN  pantoprazole    Tablet 40 milliGRAM(s) Oral before breakfast  polyethylene glycol 3350 17 Gram(s) Oral daily  senna 2 Tablet(s) Oral at bedtime  simethicone 80 milliGRAM(s) Chew every 6 hours PRN  sodium chloride 0.9% lock flush 3 milliLiter(s) IV Push every 8 hours  spironolactone 25 milliGRAM(s) Oral daily      PHYSICAL EXAM    Subjective: "I feel ok."   Neurology: alert and oriented x 3, nonfocal, no gross deficits  CV : tele:  RSR 70-80   pacemaker site cdi shannon with SS - neg bleeding/ hematoma   Sternal Wound :  prevena dressing removed- + midsternal incision serous- sang. drainage   Lungs: clear. RR easy, unlabored   Abdomen: soft, nontender, nondistended, positive bowel sounds, + bowel movement   Neg N/V/D   :  pt voiding without difficulty   Extremities:   CORONADO; neg LE edema, neg calf tenderness.   PPP bilaterallyl rt groin cdi shannon- neg bleeding noted       PW: no  Chest tubes: none

## 2021-10-23 LAB
ANION GAP SERPL CALC-SCNC: 12 MMOL/L — SIGNIFICANT CHANGE UP (ref 5–17)
BUN SERPL-MCNC: 17 MG/DL — SIGNIFICANT CHANGE UP (ref 7–23)
CALCIUM SERPL-MCNC: 8 MG/DL — LOW (ref 8.4–10.5)
CHLORIDE SERPL-SCNC: 98 MMOL/L — SIGNIFICANT CHANGE UP (ref 96–108)
CO2 SERPL-SCNC: 23 MMOL/L — SIGNIFICANT CHANGE UP (ref 22–31)
CREAT SERPL-MCNC: 0.7 MG/DL — SIGNIFICANT CHANGE UP (ref 0.5–1.3)
GLUCOSE BLDC GLUCOMTR-MCNC: 109 MG/DL — HIGH (ref 70–99)
GLUCOSE BLDC GLUCOMTR-MCNC: 117 MG/DL — HIGH (ref 70–99)
GLUCOSE SERPL-MCNC: 106 MG/DL — HIGH (ref 70–99)
HCT VFR BLD CALC: 27 % — LOW (ref 39–50)
HGB BLD-MCNC: 8.2 G/DL — LOW (ref 13–17)
INR BLD: 3.2 RATIO — HIGH (ref 0.88–1.16)
MAGNESIUM SERPL-MCNC: 2.1 MG/DL — SIGNIFICANT CHANGE UP (ref 1.6–2.6)
MCHC RBC-ENTMCNC: 26.1 PG — LOW (ref 27–34)
MCHC RBC-ENTMCNC: 30.4 GM/DL — LOW (ref 32–36)
MCV RBC AUTO: 86 FL — SIGNIFICANT CHANGE UP (ref 80–100)
NRBC # BLD: 0 /100 WBCS — SIGNIFICANT CHANGE UP (ref 0–0)
PHOSPHATE SERPL-MCNC: 2.6 MG/DL — SIGNIFICANT CHANGE UP (ref 2.5–4.5)
PLATELET # BLD AUTO: 334 K/UL — SIGNIFICANT CHANGE UP (ref 150–400)
POTASSIUM SERPL-MCNC: 4 MMOL/L — SIGNIFICANT CHANGE UP (ref 3.5–5.3)
POTASSIUM SERPL-SCNC: 4 MMOL/L — SIGNIFICANT CHANGE UP (ref 3.5–5.3)
PROTHROM AB SERPL-ACNC: 36.3 SEC — HIGH (ref 10.6–13.6)
RBC # BLD: 3.14 M/UL — LOW (ref 4.2–5.8)
RBC # FLD: 21.6 % — HIGH (ref 10.3–14.5)
SODIUM SERPL-SCNC: 133 MMOL/L — LOW (ref 135–145)
WBC # BLD: 14.64 K/UL — HIGH (ref 3.8–10.5)
WBC # FLD AUTO: 14.64 K/UL — HIGH (ref 3.8–10.5)

## 2021-10-23 RX ADMIN — OXYCODONE AND ACETAMINOPHEN 2 TABLET(S): 5; 325 TABLET ORAL at 05:50

## 2021-10-23 RX ADMIN — SODIUM CHLORIDE 3 MILLILITER(S): 9 INJECTION INTRAMUSCULAR; INTRAVENOUS; SUBCUTANEOUS at 14:00

## 2021-10-23 RX ADMIN — SODIUM CHLORIDE 3 MILLILITER(S): 9 INJECTION INTRAMUSCULAR; INTRAVENOUS; SUBCUTANEOUS at 21:00

## 2021-10-23 RX ADMIN — ATORVASTATIN CALCIUM 40 MILLIGRAM(S): 80 TABLET, FILM COATED ORAL at 21:11

## 2021-10-23 RX ADMIN — Medication 25 MICROGRAM(S): at 05:17

## 2021-10-23 RX ADMIN — OXYCODONE AND ACETAMINOPHEN 1 TABLET(S): 5; 325 TABLET ORAL at 17:43

## 2021-10-23 RX ADMIN — OXYCODONE AND ACETAMINOPHEN 2 TABLET(S): 5; 325 TABLET ORAL at 21:12

## 2021-10-23 RX ADMIN — Medication 12.5 MILLIGRAM(S): at 17:14

## 2021-10-23 RX ADMIN — PANTOPRAZOLE SODIUM 40 MILLIGRAM(S): 20 TABLET, DELAYED RELEASE ORAL at 05:41

## 2021-10-23 RX ADMIN — OXYCODONE AND ACETAMINOPHEN 2 TABLET(S): 5; 325 TABLET ORAL at 21:50

## 2021-10-23 RX ADMIN — Medication 40 MILLIGRAM(S): at 05:17

## 2021-10-23 RX ADMIN — SENNA PLUS 2 TABLET(S): 8.6 TABLET ORAL at 21:12

## 2021-10-23 RX ADMIN — SPIRONOLACTONE 25 MILLIGRAM(S): 25 TABLET, FILM COATED ORAL at 05:16

## 2021-10-23 RX ADMIN — OXYCODONE AND ACETAMINOPHEN 2 TABLET(S): 5; 325 TABLET ORAL at 05:17

## 2021-10-23 RX ADMIN — SODIUM CHLORIDE 3 MILLILITER(S): 9 INJECTION INTRAMUSCULAR; INTRAVENOUS; SUBCUTANEOUS at 05:00

## 2021-10-23 RX ADMIN — Medication 12.5 MILLIGRAM(S): at 05:16

## 2021-10-23 RX ADMIN — OXYCODONE AND ACETAMINOPHEN 1 TABLET(S): 5; 325 TABLET ORAL at 17:17

## 2021-10-23 RX ADMIN — Medication 81 MILLIGRAM(S): at 11:31

## 2021-10-23 NOTE — PROGRESS NOTE ADULT - ASSESSMENT
Patient is a 70M PMH HTN, CAD s/p CABG 10 years ago per patient , Complete Heart Block s/p PPM placement, Paroxysmal Afib on coumadin, Aortic stenosis s/p Mechanical AVR who admitted to OSH with acute heart failure exacerbation found on echo to have low normal EF 50-55%, severely dilated RA/RV/pulm HTN reduced RV fx, severe MR and severe TR. Transferred to [facility] for open heart surgery with Dr. Hua.     On 10/11/21, pt underwent Redo sternotomy MVR-t/TVR-t/RENÉE LIGATION/EPICARDIAL LEAD PLACEMENT  post-op bleeding  10/13 RTOR for evacuation of tamponade/hematoma/washout  extubated to [facility] now on n/c  a/c with heparin gtt only (PT HAS H/O AVR-MECHANICAL) - AS Pt. will need a PPM generator change on Tuesday, October 19 - seen by Dr. Crenshaw preop.   statin/ppi/no bb yet - will most likely resume after PPM generator change - was on Toprol preop  10/18 DC mediastinal tube  Generator change, LV lead placement /exchange Tuesday  > Hold hep gtt 6am  Duplex R groin R/O PSA  10/19 VSS, s/p lead extraction and LV lead hook up to generator today with EP. H/H 6.9/22.8 -1U PRBC this AM.  Per EP, no heparin/lovenox post-procedure. Can resume Coumadin post-procedure.    10/20 VSS, PA-L CXR no PTX s/p PPM. PW dc'd. INR 1.0, Coumadin 7.5mg tonight. Sternal incision with serosanguinous drainage - Prevena dressing placed.   10/21 VSS; right groin stitch d/c   10/22 VSS; prevena d/c- + midsternal incision drainage noted- local wound care and continue to monitor;  wbc 14 and pt afebrile; INR 2.3 - coumadin 5 mg this evening as per Dr. Hua  Discharge planning- home sat if midsternal incision stable  Patient is a 70M PMH HTN, CAD s/p CABG 10 years ago per patient , Complete Heart Block s/p PPM placement, Paroxysmal Afib on coumadin, Aortic stenosis s/p Mechanical AVR who admitted to OSH with acute heart failure exacerbation found on echo to have low normal EF 50-55%, severely dilated RA/RV/pulm HTN reduced RV fx, severe MR and severe TR. Transferred to [facility] for open heart surgery with Dr. Hua.     On 10/11/21, pt underwent Redo sternotomy MVR-t/TVR-t/RENÉE LIGATION/EPICARDIAL LEAD PLACEMENT  post-op bleeding  10/13 RTOR for evacuation of tamponade/hematoma/washout  extubated to [facility] now on n/c  a/c with heparin gtt only (PT HAS H/O AVR-MECHANICAL) - AS Pt. will need a PPM generator change on Tuesday, October 19 - seen by Dr. Crenshaw preop.   statin/ppi/no bb yet - will most likely resume after PPM generator change - was on Toprol preop  10/18 DC mediastinal tube  Generator change, LV lead placement /exchange Tuesday  > Hold hep gtt 6am  Duplex R groin R/O PSA  10/19 VSS, s/p lead extraction and LV lead hook up to generator today with EP. H/H 6.9/22.8 -1U PRBC this AM.  Per EP, no heparin/lovenox post-procedure. Can resume Coumadin post-procedure.    10/20 VSS, PA-L CXR no PTX s/p PPM. PW dc'd. INR 1.0, Coumadin 7.5mg tonight. Sternal incision with serosanguinous drainage - Prevena dressing placed.   10/21 VSS; right groin stitch d/c   10/22 VSS; prevena d/c- + midsternal incision drainage noted- local wound care and continue to monitor;  wbc 14 and pt afebrile; INR 2.3 - coumadin 5 mg this evening as per Dr. Hua  10/23 VSS; neg sternal drainage noted; INR 3.2 today- hold coumadin and repeat INR in am as per Dr. Hua   Discharge planning- home in am sun if INR stable ( on coum 4 mg)

## 2021-10-23 NOTE — PROGRESS NOTE ADULT - PROBLEM SELECTOR PLAN 1
Continue asa 81 daily, metoprolol 12.5 BID and atorvastatin 40 HS  Continue lasix 40 po daily and aldactone 25 daily  prevena d/c today - continue local wound care and observe incision   Strict I & Os, daily weight  Cough and deep breathe, Incentive Spirometry Q1h, Chest PT.  Ambulate 4x daily as tolerated and with PT.  C/W GI prophylaxis on protonix  Disposition: Home when medically cleared, social work following- f/u appointments made with primary care/cardiology clinics Continue asa 81 daily, metoprolol 12.5 BID and atorvastatin 40 HS  Continue lasix 40 po daily and aldactone 25 daily  continue local wound care and observe incision   Strict I & Os, daily weight  Cough and deep breathe, Incentive Spirometry Q1h, Chest PT.  Ambulate 4x daily as tolerated and with PT.  C/W GI prophylaxis on protonix  Disposition: Home when medically cleared, social work following- f/u appointments made with primary care/cardiology clinics

## 2021-10-23 NOTE — PROGRESS NOTE ADULT - SUBJECTIVE AND OBJECTIVE BOX
VITAL SIGNS    Telemetry:    Vital Signs Last 24 Hrs  T(C): 36.9 (10-23-21 @ 04:58), Max: 36.9 (10-22-21 @ 18:57)  T(F): 98.4 (10-23-21 @ 04:58), Max: 98.5 (10-22-21 @ 18:57)  HR: 72 (10-23-21 @ 04:58) (70 - 81)  BP: 113/66 (10-23-21 @ 04:58) (99/62 - 113/66)  RR: 18 (10-23-21 @ 04:58) (18 - 18)  SpO2: 96% (10-23-21 @ 04:58) (92% - 96%)            10-22 @ 07:01  -  10-23 @ 07:00  --------------------------------------------------------  IN: 800 mL / OUT: 1000 mL / NET: -200 mL    10-23 @ 07:01  -  10-23 @ 10:12  --------------------------------------------------------  IN: 240 mL / OUT: 0 mL / NET: 240 mL       Daily     Daily Weight in k.7 (23 Oct 2021 09:19)  Admit Wt: Drug Dosing Weight  Height (cm): 167.6 (19 Oct 2021 12:16)  Weight (kg): 63 (19 Oct 2021 12:16)  BMI (kg/m2): 22.4 (19 Oct 2021 12:16)  BSA (m2): 1.71 (19 Oct 2021 12:16)      CAPILLARY BLOOD GLUCOSE      POCT Blood Glucose.: 117 mg/dL (23 Oct 2021 07:21)          acetaminophen   Tablet .. 650 milliGRAM(s) Oral every 6 hours PRN  aspirin enteric coated 81 milliGRAM(s) Oral daily  atorvastatin 40 milliGRAM(s) Oral at bedtime  furosemide    Tablet 40 milliGRAM(s) Oral daily  levothyroxine 25 MICROGram(s) Oral daily  metoprolol tartrate 12.5 milliGRAM(s) Oral two times a day  oxycodone    5 mG/acetaminophen 325 mG 1 Tablet(s) Oral every 4 hours PRN  oxycodone    5 mG/acetaminophen 325 mG 2 Tablet(s) Oral every 6 hours PRN  pantoprazole    Tablet 40 milliGRAM(s) Oral before breakfast  polyethylene glycol 3350 17 Gram(s) Oral daily  senna 2 Tablet(s) Oral at bedtime  simethicone 80 milliGRAM(s) Chew every 6 hours PRN  sodium chloride 0.9% lock flush 3 milliLiter(s) IV Push every 8 hours  spironolactone 25 milliGRAM(s) Oral daily      PHYSICAL EXAM    Subjective: "Hi.   Neurology: alert and oriented x 3, nonfocal, no gross deficits  CV : tele:  RSR  Sternal Wound :  CDI with dressing , Stable  Lungs: clear. RR easy, unlabored   Abdomen: soft, nontender, nondistended, positive bowel sounds, bowel movement   Neg N/V/D   :  pt voiding without difficulty   Extremities:   CORONADO; edema, neg calf tenderness.   PPP bilaterally      PW:  Chest tubes:                 VITAL SIGNS    Telemetry:  AFIB 60-80   Vital Signs Last 24 Hrs  T(C): 36.9 (10-23-21 @ 04:58), Max: 36.9 (10-22-21 @ 18:57)  T(F): 98.4 (10-23-21 @ 04:58), Max: 98.5 (10-22-21 @ 18:57)  HR: 72 (10-23-21 @ 04:58) (70 - 81)  BP: 113/66 (10-23-21 @ 04:58) (99/62 - 113/66)  RR: 18 (10-23-21 @ 04:58) (18 - 18)  SpO2: 96% (10-23-21 @ 04:58) (92% - 96%)            10-22 @ 07:01  -  10-23 @ 07:00  --------------------------------------------------------  IN: 800 mL / OUT: 1000 mL / NET: -200 mL    10-23 @ 07:01  -  10-23 @ 10:12  --------------------------------------------------------  IN: 240 mL / OUT: 0 mL / NET: 240 mL       Daily     Daily Weight in k.7 (23 Oct 2021 09:19)  Admit Wt: Drug Dosing Weight  Height (cm): 167.6 (19 Oct 2021 12:16)  Weight (kg): 63 (19 Oct 2021 12:16)  BMI (kg/m2): 22.4 (19 Oct 2021 12:16)  BSA (m2): 1.71 (19 Oct 2021 12:16)      CAPILLARY BLOOD GLUCOSE      POCT Blood Glucose.: 117 mg/dL (23 Oct 2021 07:21)          acetaminophen   Tablet .. 650 milliGRAM(s) Oral every 6 hours PRN  aspirin enteric coated 81 milliGRAM(s) Oral daily  atorvastatin 40 milliGRAM(s) Oral at bedtime  furosemide    Tablet 40 milliGRAM(s) Oral daily  levothyroxine 25 MICROGram(s) Oral daily  metoprolol tartrate 12.5 milliGRAM(s) Oral two times a day  oxycodone    5 mG/acetaminophen 325 mG 1 Tablet(s) Oral every 4 hours PRN  oxycodone    5 mG/acetaminophen 325 mG 2 Tablet(s) Oral every 6 hours PRN  pantoprazole    Tablet 40 milliGRAM(s) Oral before breakfast  polyethylene glycol 3350 17 Gram(s) Oral daily  senna 2 Tablet(s) Oral at bedtime  simethicone 80 milliGRAM(s) Chew every 6 hours PRN  sodium chloride 0.9% lock flush 3 milliLiter(s) IV Push every 8 hours  spironolactone 25 milliGRAM(s) Oral daily      PHYSICAL EXAM    Subjective: "I'm ok.'   Neurology: alert and oriented x 3, nonfocal, no gross deficits  CV : tele:  afib 60-80 occ. v. paced   PPM site cdi shannon   Sternal Wound :  CDI SHANNON- sternum stable   Lungs: clear. RR easy, unlabored   Abdomen: soft, nontender, nondistended, positive bowel sounds, + bowel movement   Neg N/V/D   :  pt voiding without difficulty   Extremities:   CORONADO; neg LE edema, neg calf tenderness. PPP bilaterally; right groin cdi shannon- neg hematoma/ bleeding      PW: no  Chest tubes: none

## 2021-10-23 NOTE — PROGRESS NOTE ADULT - PA/NP ONLY VISIT
ACP only visit

## 2021-10-24 LAB
ANION GAP SERPL CALC-SCNC: 11 MMOL/L — SIGNIFICANT CHANGE UP (ref 5–17)
BUN SERPL-MCNC: 14 MG/DL — SIGNIFICANT CHANGE UP (ref 7–23)
CALCIUM SERPL-MCNC: 8.1 MG/DL — LOW (ref 8.4–10.5)
CHLORIDE SERPL-SCNC: 100 MMOL/L — SIGNIFICANT CHANGE UP (ref 96–108)
CO2 SERPL-SCNC: 23 MMOL/L — SIGNIFICANT CHANGE UP (ref 22–31)
CREAT SERPL-MCNC: 0.73 MG/DL — SIGNIFICANT CHANGE UP (ref 0.5–1.3)
GLUCOSE SERPL-MCNC: 94 MG/DL — SIGNIFICANT CHANGE UP (ref 70–99)
HCT VFR BLD CALC: 25.9 % — LOW (ref 39–50)
HGB BLD-MCNC: 8 G/DL — LOW (ref 13–17)
INR BLD: 3.86 RATIO — HIGH (ref 0.88–1.16)
INR BLD: 4.14 RATIO — HIGH (ref 0.88–1.16)
MCHC RBC-ENTMCNC: 26.2 PG — LOW (ref 27–34)
MCHC RBC-ENTMCNC: 30.9 GM/DL — LOW (ref 32–36)
MCV RBC AUTO: 84.9 FL — SIGNIFICANT CHANGE UP (ref 80–100)
NRBC # BLD: 0 /100 WBCS — SIGNIFICANT CHANGE UP (ref 0–0)
PLATELET # BLD AUTO: 349 K/UL — SIGNIFICANT CHANGE UP (ref 150–400)
POTASSIUM SERPL-MCNC: 4.1 MMOL/L — SIGNIFICANT CHANGE UP (ref 3.5–5.3)
POTASSIUM SERPL-SCNC: 4.1 MMOL/L — SIGNIFICANT CHANGE UP (ref 3.5–5.3)
PROTHROM AB SERPL-ACNC: 43.4 SEC — HIGH (ref 10.6–13.6)
PROTHROM AB SERPL-ACNC: 46.4 SEC — HIGH (ref 10.6–13.6)
RBC # BLD: 3.05 M/UL — LOW (ref 4.2–5.8)
RBC # FLD: 21.3 % — HIGH (ref 10.3–14.5)
SARS-COV-2 RNA SPEC QL NAA+PROBE: SIGNIFICANT CHANGE UP
SODIUM SERPL-SCNC: 134 MMOL/L — LOW (ref 135–145)
WBC # BLD: 12.25 K/UL — HIGH (ref 3.8–10.5)
WBC # FLD AUTO: 12.25 K/UL — HIGH (ref 3.8–10.5)

## 2021-10-24 RX ADMIN — Medication 81 MILLIGRAM(S): at 11:08

## 2021-10-24 RX ADMIN — OXYCODONE AND ACETAMINOPHEN 2 TABLET(S): 5; 325 TABLET ORAL at 22:30

## 2021-10-24 RX ADMIN — PANTOPRAZOLE SODIUM 40 MILLIGRAM(S): 20 TABLET, DELAYED RELEASE ORAL at 05:41

## 2021-10-24 RX ADMIN — Medication 12.5 MILLIGRAM(S): at 17:10

## 2021-10-24 RX ADMIN — Medication 40 MILLIGRAM(S): at 05:12

## 2021-10-24 RX ADMIN — OXYCODONE AND ACETAMINOPHEN 1 TABLET(S): 5; 325 TABLET ORAL at 17:40

## 2021-10-24 RX ADMIN — SPIRONOLACTONE 25 MILLIGRAM(S): 25 TABLET, FILM COATED ORAL at 05:12

## 2021-10-24 RX ADMIN — SODIUM CHLORIDE 3 MILLILITER(S): 9 INJECTION INTRAMUSCULAR; INTRAVENOUS; SUBCUTANEOUS at 05:40

## 2021-10-24 RX ADMIN — OXYCODONE AND ACETAMINOPHEN 2 TABLET(S): 5; 325 TABLET ORAL at 11:38

## 2021-10-24 RX ADMIN — Medication 25 MICROGRAM(S): at 05:12

## 2021-10-24 RX ADMIN — SODIUM CHLORIDE 3 MILLILITER(S): 9 INJECTION INTRAMUSCULAR; INTRAVENOUS; SUBCUTANEOUS at 22:19

## 2021-10-24 RX ADMIN — OXYCODONE AND ACETAMINOPHEN 2 TABLET(S): 5; 325 TABLET ORAL at 11:08

## 2021-10-24 RX ADMIN — ATORVASTATIN CALCIUM 40 MILLIGRAM(S): 80 TABLET, FILM COATED ORAL at 21:30

## 2021-10-24 RX ADMIN — Medication 650 MILLIGRAM(S): at 05:40

## 2021-10-24 RX ADMIN — Medication 12.5 MILLIGRAM(S): at 05:12

## 2021-10-24 RX ADMIN — OXYCODONE AND ACETAMINOPHEN 1 TABLET(S): 5; 325 TABLET ORAL at 17:10

## 2021-10-24 RX ADMIN — Medication 650 MILLIGRAM(S): at 05:12

## 2021-10-24 RX ADMIN — SODIUM CHLORIDE 3 MILLILITER(S): 9 INJECTION INTRAMUSCULAR; INTRAVENOUS; SUBCUTANEOUS at 13:43

## 2021-10-24 RX ADMIN — OXYCODONE AND ACETAMINOPHEN 2 TABLET(S): 5; 325 TABLET ORAL at 21:29

## 2021-10-24 NOTE — PROGRESS NOTE ADULT - PROVIDER SPECIALTY LIST ADULT
CT Surgery
Critical Care
Critical Care
Electrophysiology
CT Surgery
Critical Care
Electrophysiology
CT Surgery
Critical Care
Electrophysiology
CT Surgery

## 2021-10-24 NOTE — PROGRESS NOTE ADULT - PROBLEM SELECTOR PROBLEM 3
S/P TVR (tricuspid valve replacement)
S/P TVR (tricuspid valve replacement)
H/O mechanical aortic valve replacement
S/P TVR (tricuspid valve replacement)
S/P TVR (tricuspid valve replacement)
HTN (hypertension)
HTN (hypertension)
S/P TVR (tricuspid valve replacement)
HTN (hypertension)
S/P TVR (tricuspid valve replacement)
S/P TVR (tricuspid valve replacement)

## 2021-10-24 NOTE — PROGRESS NOTE ADULT - ASSESSMENT
Patient is a 70M PMH HTN, CAD s/p CABG 10 years ago per patient , Complete Heart Block s/p PPM placement, Paroxysmal Afib on coumadin, Aortic stenosis s/p Mechanical AVR who admitted to OSH with acute heart failure exacerbation found on echo to have low normal EF 50-55%, severely dilated RA/RV/pulm HTN reduced RV fx, severe MR and severe TR. Transferred to [facility] for open heart surgery with Dr. Hua.     On 10/11/21, pt underwent Redo sternotomy MVR-t/TVR-t/RENÉE LIGATION/EPICARDIAL LEAD PLACEMENT  post-op bleeding  10/13 RTOR for evacuation of tamponade/hematoma/washout  extubated to [facility] now on n/c  a/c with heparin gtt only (PT HAS H/O AVR-MECHANICAL) - AS Pt. will need a PPM generator change on Tuesday, October 19 - seen by Dr. Crenshaw preop.   statin/ppi/no bb yet - will most likely resume after PPM generator change - was on Toprol preop  10/18 DC mediastinal tube  Generator change, LV lead placement /exchange Tuesday  > Hold hep gtt 6am  Duplex R groin R/O PSA  10/19 VSS, s/p lead extraction and LV lead hook up to generator today with EP. H/H 6.9/22.8 -1U PRBC this AM.  Per EP, no heparin/lovenox post-procedure. Can resume Coumadin post-procedure.    10/20 VSS, PA-L CXR no PTX s/p PPM. PW dc'd. INR 1.0, Coumadin 7.5mg tonight. Sternal incision with serosanguinous drainage - Prevena dressing placed.   10/21 VSS; right groin stitch d/c   10/22 VSS; prevena d/c- + midsternal incision drainage noted- local wound care and continue to monitor;  wbc 14 and pt afebrile; INR 2.3 - coumadin 5 mg this evening as per Dr. Hua  10/23 VSS; neg sternal drainage noted; INR 3.2 today- hold coumadin and repeat INR in am as per Dr. Hua   10/24 VSS inr 4.14 - will hold Coumadin again today- discharge cancelled for today - will d/c home MOnday if inr down  Discharge planning- home in am sun if INR stable ( on coum 4 mg)

## 2021-10-24 NOTE — PROGRESS NOTE ADULT - REASON FOR ADMISSION
SOB/Open Heart Surg eval
Preop MVR TVR
SOB/Open Heart Surg eval
10/15 TARA
SOB/Open Heart Surg eval

## 2021-10-24 NOTE — PROGRESS NOTE ADULT - PROBLEM SELECTOR PROBLEM 2
Cardiac pacemaker
CAD (coronary artery disease)
Cardiac pacemaker

## 2021-10-24 NOTE — PROGRESS NOTE ADULT - SUBJECTIVE AND OBJECTIVE BOX
VITAL SIGNS-Telemetry:    Vital Signs Last 24 Hrs  T(C): 36.6 (10-24-21 @ 04:06), Max: 37.1 (10-23-21 @ 13:35)  T(F): 97.9 (10-24-21 @ 04:06), Max: 98.8 (10-23-21 @ 13:35)  HR: 76 (10-24-21 @ 04:06) (74 - 90)  BP: 103/54 (10-24-21 @ 04:06) (92/54 - 103/54)  RR: 18 (10-24-21 @ 04:06) (18 - 18)  SpO2: 94% (10-24-21 @ 04:06) (86% - 96%)         10-23 @ 07:01  -  10-24 @ 07:00  --------------------------------------------------------  IN: 600 mL / OUT: 1245 mL / NET: -645 mL        Daily     Daily Weight in k.5 (24 Oct 2021 07:24)    CAPILLARY BLOOD GLUCOSE  POCT Blood Glucose.: 109 mg/dL (23 Oct 2021 11:    Coumadin    [x ] YES          [  ]      NO         Reason:     on hold d/t hi inr  PHYSICAL EXAM:  Neurology: alert and oriented x 3, nonfocal, no gross deficits  CV : IRR IRR  Sternal Wound :  CDI , Stable  Lungs: CTA  Abdomen: soft, nontender, nondistended, positive bowel sounds, last bowel movement         Extremities:     no c/c/e leg inc cdi.    acetaminophen   Tablet .. 650 milliGRAM(s) Oral every 6 hours PRN  aspirin enteric coated 81 milliGRAM(s) Oral daily  atorvastatin 40 milliGRAM(s) Oral at bedtime  furosemide    Tablet 40 milliGRAM(s) Oral daily  levothyroxine 25 MICROGram(s) Oral daily  metoprolol tartrate 12.5 milliGRAM(s) Oral two times a day  oxycodone    5 mG/acetaminophen 325 mG 1 Tablet(s) Oral every 4 hours PRN  oxycodone    5 mG/acetaminophen 325 mG 2 Tablet(s) Oral every 6 hours PRN  pantoprazole    Tablet 40 milliGRAM(s) Oral before breakfast  polyethylene glycol 3350 17 Gram(s) Oral daily  senna 2 Tablet(s) Oral at bedtime  simethicone 80 milliGRAM(s) Chew every 6 hours PRN  sodium chloride 0.9% lock flush 3 milliLiter(s) IV Push every 8 hours  spironolactone 25 milliGRAM(s) Oral daily      Physical Therapy Rec:   Home  [  ]   Home w/ PT  [  ]  Rehab  [  ]  Discussed with Cardiothoracic Team at AM rounds.

## 2021-10-24 NOTE — PROGRESS NOTE ADULT - PROBLEM SELECTOR PLAN 1
Continue asa 81 daily, metoprolol 12.5 BID and atorvastatin 40 HS  Continue lasix 40 po daily and aldactone 25 daily  continue local wound care and observe incision   Strict I & Os, daily weight  Cough and deep breathe, Incentive Spirometry Q1h, Chest PT.  Ambulate 4x daily as tolerated and with PT.  C/W GI prophylaxis on protonix  Disposition: Home when medically cleared, social work following- f/u appointments made with primary care/cardiology clinics

## 2021-10-24 NOTE — PROGRESS NOTE ADULT - PROBLEM SELECTOR PROBLEM 1
S/P MVR (mitral valve replacement)
Mitral regurgitation
Mitral regurgitation
S/P MVR (mitral valve replacement)
Mitral regurgitation
HTN (hypertension)

## 2021-10-24 NOTE — PROGRESS NOTE ADULT - PROBLEM SELECTOR PROBLEM 4
CAD (coronary artery disease)
H/O mechanical aortic valve replacement
CAD (coronary artery disease)
CAD (coronary artery disease)
H/O mechanical aortic valve replacement
H/O mechanical aortic valve replacement
Atrial fibrillation

## 2021-10-25 ENCOUNTER — TRANSCRIPTION ENCOUNTER (OUTPATIENT)
Age: 71
End: 2021-10-25

## 2021-10-25 VITALS — WEIGHT: 143.96 LBS

## 2021-10-25 LAB
ALBUMIN SERPL ELPH-MCNC: 2.8 G/DL — LOW (ref 3.3–5)
ALP SERPL-CCNC: 275 U/L — HIGH (ref 40–120)
ALT FLD-CCNC: 12 U/L — SIGNIFICANT CHANGE UP (ref 10–45)
ANION GAP SERPL CALC-SCNC: 10 MMOL/L — SIGNIFICANT CHANGE UP (ref 5–17)
APTT BLD: 46.2 SEC — HIGH (ref 27.5–35.5)
AST SERPL-CCNC: 26 U/L — SIGNIFICANT CHANGE UP (ref 10–40)
BILIRUB SERPL-MCNC: 0.9 MG/DL — SIGNIFICANT CHANGE UP (ref 0.2–1.2)
BUN SERPL-MCNC: 13 MG/DL — SIGNIFICANT CHANGE UP (ref 7–23)
CALCIUM SERPL-MCNC: 8.6 MG/DL — SIGNIFICANT CHANGE UP (ref 8.4–10.5)
CHLORIDE SERPL-SCNC: 100 MMOL/L — SIGNIFICANT CHANGE UP (ref 96–108)
CO2 SERPL-SCNC: 24 MMOL/L — SIGNIFICANT CHANGE UP (ref 22–31)
CREAT SERPL-MCNC: 0.8 MG/DL — SIGNIFICANT CHANGE UP (ref 0.5–1.3)
GLUCOSE SERPL-MCNC: 93 MG/DL — SIGNIFICANT CHANGE UP (ref 70–99)
HCT VFR BLD CALC: 26.4 % — LOW (ref 39–50)
HGB BLD-MCNC: 8.2 G/DL — LOW (ref 13–17)
INR BLD: 3.17 RATIO — HIGH (ref 0.88–1.16)
MCHC RBC-ENTMCNC: 26.2 PG — LOW (ref 27–34)
MCHC RBC-ENTMCNC: 31.1 GM/DL — LOW (ref 32–36)
MCV RBC AUTO: 84.3 FL — SIGNIFICANT CHANGE UP (ref 80–100)
NRBC # BLD: 0 /100 WBCS — SIGNIFICANT CHANGE UP (ref 0–0)
PLATELET # BLD AUTO: 343 K/UL — SIGNIFICANT CHANGE UP (ref 150–400)
POTASSIUM SERPL-MCNC: 5.1 MMOL/L — SIGNIFICANT CHANGE UP (ref 3.5–5.3)
POTASSIUM SERPL-SCNC: 5.1 MMOL/L — SIGNIFICANT CHANGE UP (ref 3.5–5.3)
PROT SERPL-MCNC: 5.7 G/DL — LOW (ref 6–8.3)
PROTHROM AB SERPL-ACNC: 36 SEC — HIGH (ref 10.6–13.6)
RBC # BLD: 3.13 M/UL — LOW (ref 4.2–5.8)
RBC # FLD: 20.9 % — HIGH (ref 10.3–14.5)
SODIUM SERPL-SCNC: 134 MMOL/L — LOW (ref 135–145)
WBC # BLD: 12.18 K/UL — HIGH (ref 3.8–10.5)
WBC # FLD AUTO: 12.18 K/UL — HIGH (ref 3.8–10.5)

## 2021-10-25 PROCEDURE — 94660 CPAP INITIATION&MGMT: CPT

## 2021-10-25 PROCEDURE — P9037: CPT

## 2021-10-25 PROCEDURE — 83605 ASSAY OF LACTIC ACID: CPT

## 2021-10-25 PROCEDURE — 75572 CT HRT W/3D IMAGE: CPT

## 2021-10-25 PROCEDURE — 80053 COMPREHEN METABOLIC PANEL: CPT

## 2021-10-25 PROCEDURE — 86850 RBC ANTIBODY SCREEN: CPT

## 2021-10-25 PROCEDURE — 85018 HEMOGLOBIN: CPT

## 2021-10-25 PROCEDURE — C1773: CPT

## 2021-10-25 PROCEDURE — P9047: CPT

## 2021-10-25 PROCEDURE — 97110 THERAPEUTIC EXERCISES: CPT

## 2021-10-25 PROCEDURE — 86900 BLOOD TYPING SEROLOGIC ABO: CPT

## 2021-10-25 PROCEDURE — 86965 POOLING BLOOD PLATELETS: CPT

## 2021-10-25 PROCEDURE — 86901 BLOOD TYPING SEROLOGIC RH(D): CPT

## 2021-10-25 PROCEDURE — 87640 STAPH A DNA AMP PROBE: CPT

## 2021-10-25 PROCEDURE — 76000 FLUOROSCOPY <1 HR PHYS/QHP: CPT

## 2021-10-25 PROCEDURE — 94002 VENT MGMT INPAT INIT DAY: CPT

## 2021-10-25 PROCEDURE — 93306 TTE W/DOPPLER COMPLETE: CPT

## 2021-10-25 PROCEDURE — 81001 URINALYSIS AUTO W/SCOPE: CPT

## 2021-10-25 PROCEDURE — 82803 BLOOD GASES ANY COMBINATION: CPT

## 2021-10-25 PROCEDURE — 85014 HEMATOCRIT: CPT

## 2021-10-25 PROCEDURE — 93308 TTE F-UP OR LMTD: CPT

## 2021-10-25 PROCEDURE — 74176 CT ABD & PELVIS W/O CONTRAST: CPT

## 2021-10-25 PROCEDURE — 82150 ASSAY OF AMYLASE: CPT

## 2021-10-25 PROCEDURE — 82962 GLUCOSE BLOOD TEST: CPT

## 2021-10-25 PROCEDURE — 82553 CREATINE MB FRACTION: CPT

## 2021-10-25 PROCEDURE — 84484 ASSAY OF TROPONIN QUANT: CPT

## 2021-10-25 PROCEDURE — 82550 ASSAY OF CK (CPK): CPT

## 2021-10-25 PROCEDURE — 97116 GAIT TRAINING THERAPY: CPT

## 2021-10-25 PROCEDURE — 82565 ASSAY OF CREATININE: CPT

## 2021-10-25 PROCEDURE — 80061 LIPID PANEL: CPT

## 2021-10-25 PROCEDURE — 84100 ASSAY OF PHOSPHORUS: CPT

## 2021-10-25 PROCEDURE — 84480 ASSAY TRIIODOTHYRONINE (T3): CPT

## 2021-10-25 PROCEDURE — C1769: CPT

## 2021-10-25 PROCEDURE — 85730 THROMBOPLASTIN TIME PARTIAL: CPT

## 2021-10-25 PROCEDURE — 87641 MR-STAPH DNA AMP PROBE: CPT

## 2021-10-25 PROCEDURE — 71046 X-RAY EXAM CHEST 2 VIEWS: CPT

## 2021-10-25 PROCEDURE — 93926 LOWER EXTREMITY STUDY: CPT

## 2021-10-25 PROCEDURE — 93454 CORONARY ARTERY ANGIO S&I: CPT

## 2021-10-25 PROCEDURE — 86923 COMPATIBILITY TEST ELECTRIC: CPT

## 2021-10-25 PROCEDURE — P9045: CPT

## 2021-10-25 PROCEDURE — 80074 ACUTE HEPATITIS PANEL: CPT

## 2021-10-25 PROCEDURE — U0003: CPT

## 2021-10-25 PROCEDURE — 80048 BASIC METABOLIC PNL TOTAL CA: CPT

## 2021-10-25 PROCEDURE — 84443 ASSAY THYROID STIM HORMONE: CPT

## 2021-10-25 PROCEDURE — 84132 ASSAY OF SERUM POTASSIUM: CPT

## 2021-10-25 PROCEDURE — 71250 CT THORAX DX C-: CPT

## 2021-10-25 PROCEDURE — C1889: CPT

## 2021-10-25 PROCEDURE — 99152 MOD SED SAME PHYS/QHP 5/>YRS: CPT

## 2021-10-25 PROCEDURE — 85384 FIBRINOGEN ACTIVITY: CPT

## 2021-10-25 PROCEDURE — C1730: CPT

## 2021-10-25 PROCEDURE — 86803 HEPATITIS C AB TEST: CPT

## 2021-10-25 PROCEDURE — 84134 ASSAY OF PREALBUMIN: CPT

## 2021-10-25 PROCEDURE — C1898: CPT

## 2021-10-25 PROCEDURE — 83735 ASSAY OF MAGNESIUM: CPT

## 2021-10-25 PROCEDURE — C1786: CPT

## 2021-10-25 PROCEDURE — P9016: CPT

## 2021-10-25 PROCEDURE — 86891 AUTOLOGOUS BLOOD OP SALVAGE: CPT

## 2021-10-25 PROCEDURE — 84436 ASSAY OF TOTAL THYROXINE: CPT

## 2021-10-25 PROCEDURE — 73030 X-RAY EXAM OF SHOULDER: CPT

## 2021-10-25 PROCEDURE — 85610 PROTHROMBIN TIME: CPT

## 2021-10-25 PROCEDURE — C1751: CPT

## 2021-10-25 PROCEDURE — 83690 ASSAY OF LIPASE: CPT

## 2021-10-25 PROCEDURE — 97162 PT EVAL MOD COMPLEX 30 MIN: CPT

## 2021-10-25 PROCEDURE — 81003 URINALYSIS AUTO W/O SCOPE: CPT

## 2021-10-25 PROCEDURE — 82947 ASSAY GLUCOSE BLOOD QUANT: CPT

## 2021-10-25 PROCEDURE — 82435 ASSAY OF BLOOD CHLORIDE: CPT

## 2021-10-25 PROCEDURE — 83036 HEMOGLOBIN GLYCOSYLATED A1C: CPT

## 2021-10-25 PROCEDURE — 88305 TISSUE EXAM BY PATHOLOGIST: CPT

## 2021-10-25 PROCEDURE — 83880 ASSAY OF NATRIURETIC PEPTIDE: CPT

## 2021-10-25 PROCEDURE — 85396 CLOTTING ASSAY WHOLE BLOOD: CPT

## 2021-10-25 PROCEDURE — 93005 ELECTROCARDIOGRAM TRACING: CPT

## 2021-10-25 PROCEDURE — 85025 COMPLETE CBC W/AUTO DIFF WBC: CPT

## 2021-10-25 PROCEDURE — 36415 COLL VENOUS BLD VENIPUNCTURE: CPT

## 2021-10-25 PROCEDURE — 85576 BLOOD PLATELET AGGREGATION: CPT

## 2021-10-25 PROCEDURE — 85027 COMPLETE CBC AUTOMATED: CPT

## 2021-10-25 PROCEDURE — 97530 THERAPEUTIC ACTIVITIES: CPT

## 2021-10-25 PROCEDURE — 84295 ASSAY OF SERUM SODIUM: CPT

## 2021-10-25 PROCEDURE — 88300 SURGICAL PATH GROSS: CPT

## 2021-10-25 PROCEDURE — C1887: CPT

## 2021-10-25 PROCEDURE — 93321 DOPPLER ECHO F-UP/LMTD STD: CPT

## 2021-10-25 PROCEDURE — 36430 TRANSFUSION BLD/BLD COMPNT: CPT

## 2021-10-25 PROCEDURE — 86769 SARS-COV-2 COVID-19 ANTIBODY: CPT

## 2021-10-25 PROCEDURE — 82330 ASSAY OF CALCIUM: CPT

## 2021-10-25 PROCEDURE — C1894: CPT

## 2021-10-25 PROCEDURE — P9012: CPT

## 2021-10-25 PROCEDURE — 71045 X-RAY EXAM CHEST 1 VIEW: CPT

## 2021-10-25 PROCEDURE — 81599 UNLISTED MAAA: CPT

## 2021-10-25 PROCEDURE — 93880 EXTRACRANIAL BILAT STUDY: CPT

## 2021-10-25 PROCEDURE — U0005: CPT

## 2021-10-25 RX ORDER — LEVOTHYROXINE SODIUM 125 MCG
1 TABLET ORAL
Qty: 30 | Refills: 0
Start: 2021-10-25 | End: 2021-11-23

## 2021-10-25 RX ORDER — ASPIRIN/CALCIUM CARB/MAGNESIUM 324 MG
1 TABLET ORAL
Qty: 30 | Refills: 0
Start: 2021-10-25 | End: 2021-11-23

## 2021-10-25 RX ORDER — AMLODIPINE BESYLATE 2.5 MG/1
1 TABLET ORAL
Qty: 0 | Refills: 0 | DISCHARGE

## 2021-10-25 RX ORDER — WARFARIN SODIUM 2.5 MG/1
1 TABLET ORAL
Qty: 0 | Refills: 0 | DISCHARGE

## 2021-10-25 RX ORDER — FUROSEMIDE 40 MG
1 TABLET ORAL
Qty: 0 | Refills: 0 | DISCHARGE

## 2021-10-25 RX ORDER — METOPROLOL TARTRATE 50 MG
1 TABLET ORAL
Qty: 30 | Refills: 0
Start: 2021-10-25 | End: 2021-11-23

## 2021-10-25 RX ORDER — PANTOPRAZOLE SODIUM 20 MG/1
1 TABLET, DELAYED RELEASE ORAL
Qty: 30 | Refills: 0
Start: 2021-10-25 | End: 2021-11-23

## 2021-10-25 RX ORDER — SENNA PLUS 8.6 MG/1
2 TABLET ORAL
Qty: 60 | Refills: 0
Start: 2021-10-25 | End: 2021-11-23

## 2021-10-25 RX ORDER — ATORVASTATIN CALCIUM 80 MG/1
1 TABLET, FILM COATED ORAL
Qty: 30 | Refills: 0
Start: 2021-10-25 | End: 2021-11-23

## 2021-10-25 RX ORDER — SPIRONOLACTONE 25 MG/1
1 TABLET, FILM COATED ORAL
Qty: 30 | Refills: 0
Start: 2021-10-25 | End: 2021-11-23

## 2021-10-25 RX ORDER — METOPROLOL TARTRATE 50 MG
1 TABLET ORAL
Qty: 0 | Refills: 0 | DISCHARGE

## 2021-10-25 RX ORDER — FUROSEMIDE 40 MG
1 TABLET ORAL
Qty: 30 | Refills: 0
Start: 2021-10-25 | End: 2021-11-23

## 2021-10-25 RX ORDER — ATORVASTATIN CALCIUM 80 MG/1
1 TABLET, FILM COATED ORAL
Qty: 0 | Refills: 0 | DISCHARGE

## 2021-10-25 RX ORDER — ACETAMINOPHEN 500 MG
2 TABLET ORAL
Qty: 0 | Refills: 0 | DISCHARGE
Start: 2021-10-25

## 2021-10-25 RX ADMIN — PANTOPRAZOLE SODIUM 40 MILLIGRAM(S): 20 TABLET, DELAYED RELEASE ORAL at 05:50

## 2021-10-25 RX ADMIN — SPIRONOLACTONE 25 MILLIGRAM(S): 25 TABLET, FILM COATED ORAL at 05:50

## 2021-10-25 RX ADMIN — Medication 40 MILLIGRAM(S): at 05:50

## 2021-10-25 RX ADMIN — Medication 12.5 MILLIGRAM(S): at 05:50

## 2021-10-25 RX ADMIN — SODIUM CHLORIDE 3 MILLILITER(S): 9 INJECTION INTRAMUSCULAR; INTRAVENOUS; SUBCUTANEOUS at 07:28

## 2021-10-25 RX ADMIN — Medication 650 MILLIGRAM(S): at 14:23

## 2021-10-25 RX ADMIN — Medication 650 MILLIGRAM(S): at 15:20

## 2021-10-25 RX ADMIN — SODIUM CHLORIDE 3 MILLILITER(S): 9 INJECTION INTRAMUSCULAR; INTRAVENOUS; SUBCUTANEOUS at 13:11

## 2021-10-25 RX ADMIN — Medication 25 MICROGRAM(S): at 05:50

## 2021-10-25 RX ADMIN — Medication 81 MILLIGRAM(S): at 08:37

## 2021-10-25 NOTE — DISCHARGE NOTE PROVIDER - NSDCMRMEDTOKEN_GEN_ALL_CORE_FT
acetaminophen 325 mg oral tablet: 2 tab(s) orally every 6 hours, As needed, Mild Pain (1 - 3)  aspirin 81 mg oral delayed release tablet: 1 tab(s) orally once a day  atorvastatin 40 mg oral tablet: 1 tab(s) orally once a day  furosemide 40 mg oral tablet: 1 tab(s) orally once a day  levothyroxine 25 mcg (0.025 mg) oral tablet: 1 tab(s) orally once a day  oxycodone-acetaminophen 5 mg-325 mg oral tablet: 1 tab(s) orally every 6 hours, As Needed -Moderate Pain (4 - 6) MDD:4  pantoprazole 40 mg oral delayed release tablet: 1 tab(s) orally once a day (before a meal)  senna oral tablet: 2 tab(s) orally once a day (at bedtime)  spironolactone 25 mg oral tablet: 1 tab(s) orally once a day  thiamine 100 mg oral tablet: 1 tab(s) orally once a day  Toprol-XL 25 mg oral tablet, extended release: 1 tab(s) orally once a day  warfarin 4 mg oral tablet: 1 tab(s) orally once a day (at bedtime)

## 2021-10-25 NOTE — DISCHARGE NOTE PROVIDER - HOSPITAL COURSE
Patient is a 70M PMH HTN, CAD s/p CABG 10 years ago per patient , Complete Heart Block s/p PPM placement, Paroxysmal Afib on coumadin, Aortic stenosis s/p Mechanical AVR who admitted to OSH with acute heart failure exacerbation found on echo to have low normal EF 50-55%, severely dilated RA/RV/pulm HTN reduced RV fx, severe MR and severe TR. Transferred to [facility] for open heart surgery with Dr. Hua.     On 10/11/21, pt underwent Redo sternotomy MVR-t/TVR-t/RENÉE LIGATION/EPICARDIAL LEAD PLACEMENT  post-op bleeding  10/13 RTOR for evacuation of tamponade/hematoma/washout  extubated to [facility] now on n/c  a/c with heparin gtt only (PT HAS H/O AVR-MECHANICAL) - AS Pt. will need a PPM generator change on Tuesday, October 19 - seen by Dr. Crenshaw preop.   statin/ppi/no bb yet - will most likely resume after PPM generator change - was on Toprol preop  10/18 DC mediastinal tube  Generator change, LV lead placement /exchange Tuesday  > Hold hep gtt 6am  Duplex R groin R/O PSA  10/19 VSS, s/p lead extraction and LV lead hook up to generator today with EP. H/H 6.9/22.8 -1U PRBC this AM.  Per EP, no heparin/lovenox post-procedure. Can resume Coumadin post-procedure.    10/20 VSS, PA-L CXR no PTX s/p PPM. PW dc'd. INR 1.0, Coumadin 7.5mg tonight. Sternal incision with serosanguinous drainage - Prevena dressing placed.   10/21 VSS; right groin stitch d/c   10/22 VSS; prevena d/c- + midsternal incision drainage noted- local wound care and continue to monitor;  wbc 14 and pt afebrile; INR 2.3 - coumadin 5 mg this evening as per Dr. Hua  10/23 VSS; neg sternal drainage noted; INR 3.2 today- hold coumadin and repeat INR in am as per Dr. Hua   10/24 VSS inr 4.14 - will hold Coumadin again today- discharge cancelled for today - will d/c home MOnday if inr down  10/25 VSS - INR 3.17 - will hold coumadin tonight & d/c home on 4mg coumadin starting tomorrow.  f/u with our office - NP for INR check on Wednesday 10/27. follow up appt with PCP @ [facility] clinic 10/29.  INR check @ [facility] w/ PCP on Monday 11/1 & thereafter.  Follow up with [facility] cardiologist on 11/22/21 # [facility] clinic   Discharge planning- home in am sun if INR stable ( on coum 4 mg) Patient is a 70M PMH HTN, CAD s/p CABG 10 years ago per patient , Complete Heart Block s/p PPM placement, Paroxysmal Afib on coumadin, Aortic stenosis s/p Mechanical AVR who admitted to OSH with acute heart failure exacerbation found on echo to have low normal EF 50-55%, severely dilated RA/RV/pulm HTN reduced RV fx, severe MR and severe TR. Transferred to [facility] for open heart surgery with Dr. Hua.     On 10/11/21, pt underwent Redo sternotomy MVR-t/TVR-t/RENÉE LIGATION/EPICARDIAL LEAD PLACEMENT  post-op bleeding  10/13 RTOR for evacuation of tamponade/hematoma/washout  extubated to [facility] now on n/c  a/c with heparin gtt only (PT HAS H/O AVR-MECHANICAL) - AS Pt. will need a PPM generator change on Tuesday, October 19 - seen by Dr. Crenshaw preop.   statin/ppi/no bb yet - will most likely resume after PPM generator change - was on Toprol preop  10/18 DC mediastinal tube  Generator change, LV lead placement /exchange Tuesday  > Hold hep gtt 6am  Duplex R groin R/O PSA  10/19 VSS, s/p lead extraction and LV lead hook up to generator today with EP. H/H 6.9/22.8 -1U PRBC this AM.  Per EP, no heparin/lovenox post-procedure. Can resume Coumadin post-procedure.    10/20 VSS, PA-L CXR no PTX s/p PPM. PW dc'd. INR 1.0, Coumadin 7.5mg tonight. Sternal incision with serosanguinous drainage - Prevena dressing placed.   10/21 VSS; right groin stitch d/c   10/22 VSS; prevena d/c- + midsternal incision drainage noted- local wound care and continue to monitor;  wbc 14 and pt afebrile; INR 2.3 - coumadin 5 mg this evening as per Dr. Hua  10/23 VSS; neg sternal drainage noted; INR 3.2 today- hold coumadin and repeat INR in am as per Dr. Hua   10/24 VSS inr 4.14 - will hold Coumadin again today- discharge cancelled for today - will d/c home MOnday if inr down  10/25 VSS - INR 3.17 - will hold coumadin tonight & d/c home on 4mg coumadin starting tomorrow.  f/u with our office - NP for INR check on Wednesday 10/27 @ 9:45am   Follow up with [facility] clinic as scheduled by Social work  Discharge planning- home in am sun if INR stable ( on coum 4 mg)  pt to stay with his son Salvador - all discharge instructions d/w Salvador & daughter Tonya.

## 2021-10-25 NOTE — DISCHARGE NOTE PROVIDER - NSDCFUSCHEDAPPT_GEN_ALL_CORE_FT
EDISON ROBLES ; 10/27/2021 ; NPP CT Surg 300 [address] EDISON Brown ; 10/29/2021 ; NPP Cardio Electro [address]

## 2021-10-25 NOTE — DISCHARGE NOTE PROVIDER - NSDCFUADDAPPT_GEN_ALL_CORE_FT
Cardiothoracic Surgery:  Wednesday, October 27th at 9:45 with Dr. Hua   [address] [place] [address] Drive, [address] [place] [address]   Phone: [phone]	     Primary Care Follow Up:   Friday, October 29th at 1:30PM with Dr. Adolph Alexis at [facility]   [address] [place] [address]  [phone]   Bring blood draw scripts and hospital paperwork with you.     Cardiology:   Monday, November 22nd at 2:45PM with the [facility]   [address] [place] [address]  [phone]

## 2021-10-25 NOTE — DISCHARGE NOTE PROVIDER - CARE PROVIDER_API CALL
Lukas Hua)  Surgery; Thoracic Surgery  [address] [place] [address]  Phone: [phone]  Fax: [phone]  Follow Up Time:

## 2021-10-25 NOTE — DISCHARGE NOTE PROVIDER - NSDCPNSUBOBJ_GEN_ALL_CORE
PHYSICAL EXAM:  Neurology: alert and oriented x 3, nonfocal, no gross deficits  CV : IRR IRR  Sternal Wound :  CDI , Stable  Lungs: CTA  Abdomen: soft, nontender, nondistended, positive bowel sounds, last bowel movement  10/25       Extremities:     no c/c/e leg inc cdi.

## 2021-10-26 ENCOUNTER — NON-APPOINTMENT (OUTPATIENT)
Age: 71
End: 2021-10-26

## 2021-10-26 RX ORDER — OXYCODONE AND ACETAMINOPHEN 5; 325 MG/1; MG/1
5-325 TABLET ORAL EVERY 6 HOURS
Refills: 0 | Status: ACTIVE | COMMUNITY
Start: 2021-10-26

## 2021-10-26 RX ORDER — WARFARIN SODIUM 2.5 MG/1
1 TABLET ORAL
Qty: 30 | Refills: 0
Start: 2021-10-26 | End: 2021-11-24

## 2021-10-26 RX ORDER — PANTOPRAZOLE 40 MG/1
40 TABLET, DELAYED RELEASE ORAL
Qty: 30 | Refills: 0 | Status: ACTIVE | COMMUNITY
Start: 2021-10-26

## 2021-10-26 RX ORDER — WARFARIN 4 MG/1
4 TABLET ORAL DAILY
Refills: 0 | Status: ACTIVE | COMMUNITY
Start: 2021-10-26

## 2021-10-26 RX ORDER — ASPIRIN ENTERIC COATED TABLETS 81 MG 81 MG/1
81 TABLET, DELAYED RELEASE ORAL DAILY
Qty: 60 | Refills: 0 | Status: ACTIVE | COMMUNITY
Start: 2021-10-26

## 2021-10-26 RX ORDER — ATORVASTATIN CALCIUM 40 MG/1
40 TABLET, FILM COATED ORAL
Qty: 30 | Refills: 0 | Status: ACTIVE | COMMUNITY
Start: 2021-10-26

## 2021-10-26 RX ORDER — LEVOTHYROXINE SODIUM 0.03 MG/1
25 TABLET ORAL DAILY
Refills: 0 | Status: ACTIVE | COMMUNITY
Start: 2021-10-26

## 2021-10-26 RX ORDER — ACETAMINOPHEN 325 MG/1
325 TABLET ORAL EVERY 6 HOURS
Qty: 40 | Refills: 0 | Status: ACTIVE | COMMUNITY
Start: 2021-10-26

## 2021-10-27 ENCOUNTER — APPOINTMENT (OUTPATIENT)
Dept: CARDIOTHORACIC SURGERY | Facility: CLINIC | Age: 71
End: 2021-10-27
Payer: MEDICAID

## 2021-10-27 VITALS
TEMPERATURE: 98.1 F | DIASTOLIC BLOOD PRESSURE: 53 MMHG | SYSTOLIC BLOOD PRESSURE: 97 MMHG | WEIGHT: 154 LBS | HEART RATE: 82 BPM | OXYGEN SATURATION: 97 % | RESPIRATION RATE: 16 BRPM | HEIGHT: 67 IN | BODY MASS INDEX: 24.17 KG/M2

## 2021-10-27 VITALS — SYSTOLIC BLOOD PRESSURE: 102 MMHG | DIASTOLIC BLOOD PRESSURE: 64 MMHG

## 2021-10-27 LAB
INR PPP: 1.93 RATIO
PT BLD: 22.5 SEC

## 2021-10-27 PROCEDURE — 99024 POSTOP FOLLOW-UP VISIT: CPT

## 2021-11-05 ENCOUNTER — APPOINTMENT (OUTPATIENT)
Dept: ELECTROPHYSIOLOGY | Facility: CLINIC | Age: 71
End: 2021-11-05

## 2021-11-10 ENCOUNTER — APPOINTMENT (OUTPATIENT)
Dept: CARDIOTHORACIC SURGERY | Facility: CLINIC | Age: 71
End: 2021-11-10
Payer: MEDICAID

## 2021-11-10 ENCOUNTER — LABORATORY RESULT (OUTPATIENT)
Age: 71
End: 2021-11-10

## 2021-11-10 VITALS
DIASTOLIC BLOOD PRESSURE: 61 MMHG | RESPIRATION RATE: 16 BRPM | HEIGHT: 67 IN | TEMPERATURE: 97.8 F | SYSTOLIC BLOOD PRESSURE: 100 MMHG | HEART RATE: 84 BPM | OXYGEN SATURATION: 98 %

## 2021-11-10 PROCEDURE — 99024 POSTOP FOLLOW-UP VISIT: CPT

## 2021-11-17 ENCOUNTER — APPOINTMENT (OUTPATIENT)
Dept: CV DIAGNOSITCS | Facility: HOSPITAL | Age: 71
End: 2021-11-17

## 2021-11-17 ENCOUNTER — OUTPATIENT (OUTPATIENT)
Dept: OUTPATIENT SERVICES | Facility: HOSPITAL | Age: 71
LOS: 1 days | End: 2021-11-17
Payer: SELF-PAY

## 2021-11-17 ENCOUNTER — APPOINTMENT (OUTPATIENT)
Dept: CARDIOTHORACIC SURGERY | Facility: CLINIC | Age: 71
End: 2021-11-17
Payer: MEDICAID

## 2021-11-17 ENCOUNTER — NON-APPOINTMENT (OUTPATIENT)
Age: 71
End: 2021-11-17

## 2021-11-17 VITALS
HEART RATE: 88 BPM | TEMPERATURE: 97.6 F | OXYGEN SATURATION: 99 % | RESPIRATION RATE: 18 BRPM | DIASTOLIC BLOOD PRESSURE: 55 MMHG | HEIGHT: 67 IN | SYSTOLIC BLOOD PRESSURE: 109 MMHG | WEIGHT: 131 LBS | BODY MASS INDEX: 20.56 KG/M2

## 2021-11-17 DIAGNOSIS — Z95.2 PRESENCE OF PROSTHETIC HEART VALVE: Chronic | ICD-10-CM

## 2021-11-17 DIAGNOSIS — Z95.1 PRESENCE OF AORTOCORONARY BYPASS GRAFT: Chronic | ICD-10-CM

## 2021-11-17 DIAGNOSIS — R06.02 SHORTNESS OF BREATH: ICD-10-CM

## 2021-11-17 DIAGNOSIS — Z95.0 PRESENCE OF CARDIAC PACEMAKER: Chronic | ICD-10-CM

## 2021-11-17 LAB
INR PPP: 2.22 RATIO
PT BLD: 25.7 SEC

## 2021-11-17 PROCEDURE — 93306 TTE W/DOPPLER COMPLETE: CPT

## 2021-11-17 PROCEDURE — 71046 X-RAY EXAM CHEST 2 VIEWS: CPT | Mod: 26

## 2021-11-17 PROCEDURE — 99024 POSTOP FOLLOW-UP VISIT: CPT

## 2021-11-17 PROCEDURE — 93306 TTE W/DOPPLER COMPLETE: CPT | Mod: 26

## 2021-11-17 PROCEDURE — 71046 X-RAY EXAM CHEST 2 VIEWS: CPT

## 2021-11-17 RX ORDER — FUROSEMIDE 40 MG/1
40 TABLET ORAL DAILY
Qty: 14 | Refills: 0 | Status: COMPLETED | COMMUNITY
Start: 2021-10-26 | End: 2021-11-17

## 2021-11-17 RX ORDER — SPIRONOLACTONE 25 MG/1
25 TABLET ORAL
Qty: 14 | Refills: 0 | Status: COMPLETED | COMMUNITY
Start: 2021-10-26 | End: 2021-11-17

## 2021-11-17 RX ORDER — METOPROLOL SUCCINATE 25 MG/1
25 TABLET, EXTENDED RELEASE ORAL DAILY
Refills: 0 | Status: COMPLETED | COMMUNITY
Start: 2021-10-26 | End: 2021-11-17

## 2021-11-19 ENCOUNTER — OUTPATIENT (OUTPATIENT)
Dept: OUTPATIENT SERVICES | Facility: HOSPITAL | Age: 71
LOS: 1 days | End: 2021-11-19
Payer: SELF-PAY

## 2021-11-19 ENCOUNTER — APPOINTMENT (OUTPATIENT)
Dept: CARDIOTHORACIC SURGERY | Facility: CLINIC | Age: 71
End: 2021-11-19
Payer: MEDICAID

## 2021-11-19 VITALS
HEIGHT: 67 IN | HEART RATE: 84 BPM | OXYGEN SATURATION: 96 % | WEIGHT: 131 LBS | BODY MASS INDEX: 20.56 KG/M2 | SYSTOLIC BLOOD PRESSURE: 106 MMHG | RESPIRATION RATE: 16 BRPM | DIASTOLIC BLOOD PRESSURE: 61 MMHG | TEMPERATURE: 98 F

## 2021-11-19 VITALS
WEIGHT: 131 LBS | HEIGHT: 67 IN | SYSTOLIC BLOOD PRESSURE: 102 MMHG | DIASTOLIC BLOOD PRESSURE: 61 MMHG | TEMPERATURE: 98.2 F | HEART RATE: 84 BPM | RESPIRATION RATE: 16 BRPM | OXYGEN SATURATION: 98 % | BODY MASS INDEX: 20.56 KG/M2

## 2021-11-19 DIAGNOSIS — R06.02 SHORTNESS OF BREATH: ICD-10-CM

## 2021-11-19 DIAGNOSIS — Z95.0 PRESENCE OF CARDIAC PACEMAKER: Chronic | ICD-10-CM

## 2021-11-19 DIAGNOSIS — Z95.2 PRESENCE OF PROSTHETIC HEART VALVE: ICD-10-CM

## 2021-11-19 DIAGNOSIS — Z95.4 PRESENCE OF OTHER HEART-VALVE REPLACEMENT: ICD-10-CM

## 2021-11-19 DIAGNOSIS — Z95.1 PRESENCE OF AORTOCORONARY BYPASS GRAFT: Chronic | ICD-10-CM

## 2021-11-19 DIAGNOSIS — Z95.2 PRESENCE OF PROSTHETIC HEART VALVE: Chronic | ICD-10-CM

## 2021-11-19 LAB
INR PPP: 1.51 RATIO
PT BLD: 17.8 SEC

## 2021-11-19 PROCEDURE — 71046 X-RAY EXAM CHEST 2 VIEWS: CPT

## 2021-11-19 PROCEDURE — 99024 POSTOP FOLLOW-UP VISIT: CPT

## 2021-11-19 PROCEDURE — 71046 X-RAY EXAM CHEST 2 VIEWS: CPT | Mod: 26

## 2021-11-23 ENCOUNTER — TRANSCRIPTION ENCOUNTER (OUTPATIENT)
Age: 71
End: 2021-11-23

## 2022-09-29 ENCOUNTER — APPOINTMENT (OUTPATIENT)
Dept: ELECTROPHYSIOLOGY | Facility: CLINIC | Age: 72
End: 2022-09-29

## 2022-09-29 ENCOUNTER — FORM ENCOUNTER (OUTPATIENT)
Age: 72
End: 2022-09-29

## 2022-10-31 ENCOUNTER — APPOINTMENT (OUTPATIENT)
Dept: ELECTROPHYSIOLOGY | Facility: CLINIC | Age: 72
End: 2022-10-31

## 2022-10-31 ENCOUNTER — FORM ENCOUNTER (OUTPATIENT)
Age: 72
End: 2022-10-31

## 2022-12-09 ENCOUNTER — APPOINTMENT (OUTPATIENT)
Dept: ELECTROPHYSIOLOGY | Facility: CLINIC | Age: 72
End: 2022-12-09

## 2022-12-11 ENCOUNTER — FORM ENCOUNTER (OUTPATIENT)
Age: 72
End: 2022-12-11

## 2023-01-13 ENCOUNTER — APPOINTMENT (OUTPATIENT)
Dept: ELECTROPHYSIOLOGY | Facility: CLINIC | Age: 73
End: 2023-01-13

## 2023-04-20 ENCOUNTER — APPOINTMENT (OUTPATIENT)
Dept: ELECTROPHYSIOLOGY | Facility: CLINIC | Age: 73
End: 2023-04-20

## 2024-05-29 NOTE — PROGRESS NOTE ADULT - PROBLEM SELECTOR PLAN 2
Spoke with patient. Patient already had lab appointment scheduled.    CHB s/p [employer] ADAPTA DC PPM   EP following  s/p lead extraction and LV lead hook up to generator 10/19  - NO HEPARIN/LOVENOX PRODUCTS POST PROCEDURE. Can resume AC post-procedure.

## 2025-03-04 NOTE — PHYSICAL THERAPY INITIAL EVALUATION ADULT - SITTING BALANCE: STATIC
Quality 226: Preventive Care And Screening: Tobacco Use: Screening And Cessation Intervention: Patient screened for tobacco use and is an ex/non-smoker Quality 130: Documentation Of Current Medications In The Medical Record: Current Medications Documented Detail Level: Detailed good balance